# Patient Record
Sex: MALE | Race: BLACK OR AFRICAN AMERICAN | NOT HISPANIC OR LATINO
[De-identification: names, ages, dates, MRNs, and addresses within clinical notes are randomized per-mention and may not be internally consistent; named-entity substitution may affect disease eponyms.]

---

## 2018-03-29 PROBLEM — Z00.00 ENCOUNTER FOR PREVENTIVE HEALTH EXAMINATION: Status: ACTIVE | Noted: 2018-03-29

## 2018-04-16 ENCOUNTER — APPOINTMENT (OUTPATIENT)
Dept: UROLOGY | Facility: CLINIC | Age: 58
End: 2018-04-16
Payer: MEDICAID

## 2018-04-16 VITALS
HEART RATE: 91 BPM | RESPIRATION RATE: 18 BRPM | DIASTOLIC BLOOD PRESSURE: 80 MMHG | OXYGEN SATURATION: 98 % | WEIGHT: 199 LBS | BODY MASS INDEX: 27.86 KG/M2 | HEIGHT: 71 IN | TEMPERATURE: 97.8 F | SYSTOLIC BLOOD PRESSURE: 122 MMHG

## 2018-04-16 DIAGNOSIS — S69.90XA UNSPECIFIED INJURY OF UNSPECIFIED WRIST, HAND AND FINGER(S), INITIAL ENCOUNTER: ICD-10-CM

## 2018-04-16 DIAGNOSIS — Z86.39 PERSONAL HISTORY OF OTHER ENDOCRINE, NUTRITIONAL AND METABOLIC DISEASE: ICD-10-CM

## 2018-04-16 DIAGNOSIS — Z80.1 FAMILY HISTORY OF MALIGNANT NEOPLASM OF TRACHEA, BRONCHUS AND LUNG: ICD-10-CM

## 2018-04-16 DIAGNOSIS — Z78.9 OTHER SPECIFIED HEALTH STATUS: ICD-10-CM

## 2018-04-16 DIAGNOSIS — Z80.42 FAMILY HISTORY OF MALIGNANT NEOPLASM OF PROSTATE: ICD-10-CM

## 2018-04-16 PROCEDURE — 99204 OFFICE O/P NEW MOD 45 MIN: CPT

## 2018-04-17 LAB — PSA SERPL-MCNC: 7.15 NG/ML

## 2018-04-18 LAB — BACTERIA UR CULT: NORMAL

## 2018-05-07 ENCOUNTER — APPOINTMENT (OUTPATIENT)
Dept: UROLOGY | Facility: CLINIC | Age: 58
End: 2018-05-07
Payer: MEDICAID

## 2018-05-07 VITALS — DIASTOLIC BLOOD PRESSURE: 84 MMHG | SYSTOLIC BLOOD PRESSURE: 130 MMHG

## 2018-05-07 DIAGNOSIS — Z86.39 PERSONAL HISTORY OF OTHER ENDOCRINE, NUTRITIONAL AND METABOLIC DISEASE: ICD-10-CM

## 2018-05-07 PROCEDURE — 99214 OFFICE O/P EST MOD 30 MIN: CPT

## 2018-05-07 RX ORDER — METFORMIN HYDROCHLORIDE 500 MG/1
500 TABLET, COATED ORAL
Refills: 0 | Status: ACTIVE | COMMUNITY

## 2018-06-04 ENCOUNTER — LABORATORY RESULT (OUTPATIENT)
Age: 58
End: 2018-06-04

## 2018-06-04 ENCOUNTER — APPOINTMENT (OUTPATIENT)
Dept: UROLOGY | Facility: CLINIC | Age: 58
End: 2018-06-04
Payer: MEDICAID

## 2018-06-04 VITALS — SYSTOLIC BLOOD PRESSURE: 140 MMHG | DIASTOLIC BLOOD PRESSURE: 80 MMHG | OXYGEN SATURATION: 97 % | HEART RATE: 88 BPM

## 2018-06-04 PROCEDURE — 55700: CPT

## 2018-06-04 PROCEDURE — 76942 ECHO GUIDE FOR BIOPSY: CPT | Mod: 59

## 2018-06-04 PROCEDURE — 76872 US TRANSRECTAL: CPT

## 2018-06-04 RX ORDER — AMIKACIN SULFATE 250 MG/ML
500 INJECTION, SOLUTION INTRAMUSCULAR; INTRAVENOUS
Refills: 0 | Status: COMPLETED | OUTPATIENT
Start: 2018-06-04

## 2018-06-04 RX ADMIN — AMIKACIN SULFATE 0 MG/2ML: 250 INJECTION, SOLUTION INTRAMUSCULAR; INTRAVENOUS at 00:00

## 2018-06-11 ENCOUNTER — APPOINTMENT (OUTPATIENT)
Dept: UROLOGY | Facility: CLINIC | Age: 58
End: 2018-06-11
Payer: MEDICAID

## 2018-06-11 PROCEDURE — 99214 OFFICE O/P EST MOD 30 MIN: CPT

## 2018-06-11 RX ORDER — DICLOFENAC SODIUM 50 MG/1
50 TABLET, DELAYED RELEASE ORAL
Qty: 14 | Refills: 0 | Status: ACTIVE | COMMUNITY
Start: 2018-06-07

## 2018-06-11 RX ORDER — AMOXICILLIN AND CLAVULANATE POTASSIUM 875; 125 MG/1; MG/1
875-125 TABLET, COATED ORAL
Qty: 6 | Refills: 0 | Status: DISCONTINUED | COMMUNITY
Start: 2018-05-07 | End: 2018-06-11

## 2018-12-13 ENCOUNTER — INBOUND DOCUMENT (OUTPATIENT)
Age: 58
End: 2018-12-13

## 2018-12-17 ENCOUNTER — APPOINTMENT (OUTPATIENT)
Dept: UROLOGY | Facility: CLINIC | Age: 58
End: 2018-12-17

## 2019-03-20 ENCOUNTER — MESSAGE (OUTPATIENT)
Age: 59
End: 2019-03-20

## 2019-03-25 ENCOUNTER — APPOINTMENT (OUTPATIENT)
Dept: UROLOGY | Facility: CLINIC | Age: 59
End: 2019-03-25
Payer: MEDICAID

## 2019-03-25 VITALS
BODY MASS INDEX: 27.72 KG/M2 | WEIGHT: 198 LBS | SYSTOLIC BLOOD PRESSURE: 150 MMHG | DIASTOLIC BLOOD PRESSURE: 90 MMHG | HEIGHT: 71 IN | OXYGEN SATURATION: 98 % | HEART RATE: 95 BPM | TEMPERATURE: 98.1 F

## 2019-03-25 PROCEDURE — 99214 OFFICE O/P EST MOD 30 MIN: CPT

## 2019-03-25 RX ORDER — TAMSULOSIN HYDROCHLORIDE 0.4 MG/1
0.4 CAPSULE ORAL
Qty: 90 | Refills: 0 | Status: DISCONTINUED | COMMUNITY
Start: 2018-05-07 | End: 2019-03-25

## 2019-03-25 NOTE — LETTER BODY
[Dear  ___] : Dear  [unfilled], [Consult Letter:] : I had the pleasure of evaluating your patient, [unfilled]. [Consult Closing:] : Thank you very much for allowing me to participate in the care of this patient.  If you have any questions, please do not hesitate to contact me. [FreeTextEntry1] : ACP\par [address] \[place], [place], [address]  \par [phone] \par

## 2019-03-25 NOTE — PHYSICAL EXAM
[General Appearance - Well Developed] : well developed [General Appearance - Well Nourished] : well nourished [Normal Appearance] : normal appearance [Well Groomed] : well groomed [General Appearance - In No Acute Distress] : no acute distress [Abdomen Soft] : soft [Abdomen Tenderness] : non-tender [Costovertebral Angle Tenderness] : no ~M costovertebral angle tenderness [Urethral Meatus] : meatus normal [Urinary Bladder Findings] : the bladder was normal on palpation [Scrotum] : the scrotum was normal [Testes Tenderness] : no tenderness of the testes [Testes Mass (___cm)] : there were no testicular masses [Prostate Tenderness] : the prostate was not tender [No Prostate Nodules] : no prostate nodules [Prostate Enlarged] : was enlarged [FreeTextEntry1] : scarred and tight phimosis, JAZMIN done 4/2018 [] : no respiratory distress [Respiration, Rhythm And Depth] : normal respiratory rhythm and effort [Exaggerated Use Of Accessory Muscles For Inspiration] : no accessory muscle use [Oriented To Time, Place, And Person] : oriented to person, place, and time [Affect] : the affect was normal [Mood] : the mood was normal [Not Anxious] : not anxious

## 2019-03-25 NOTE — REVIEW OF SYSTEMS
[see HPI] : see HPI [Nocturia] : nocturia [Erectile Dysfunction] : erectile dysfunction [Negative] : Gastrointestinal

## 2019-03-25 NOTE — HISTORY OF PRESENT ILLNESS
[FreeTextEntry1] : He is a 58-year-old man who is seen today for multiple urological issues. He is no longer taking Flomax. For a short time he had difficulty with urination but it has subsided. Residual urine today wiwas th minimal. He is still interested in circumcision due to tight foreskin. Previously, he was prescribed generic Viagra but he has not used it yet. Also he thinks diabetes is better controlled but he has not been checked recently. According to the patient, he is supposed to have repeat blood work including PSA level in the next few days. His last hemoglobin A1c was still around 12. Prostate biopsy done for PSA level of 7 was benign in June 2018.\par Previous note: Nocturia was almost every hour. Urinary flow is average. In March 2018, PSA level was 7.9. There is no hematuria or dysuria. His father may have had prostate cancer. Patient had not seen a doctor for over 10 years and was first seen in March 2018. Hemoglobin A1c was over 13 and urinalysis shows glucose and white blood cells.

## 2019-03-25 NOTE — ASSESSMENT
[FreeTextEntry1] : Urinary symptoms improved and he no longer is on Flomax. He will give a try to Viagra when he is ready. He still has a very tight phimosis and will be scheduled in the near future for circumcision. Hopefully diabetes is controlled better. Also he will have a PSA level done soon and he can call me to discuss the results. He might require pelvic MRI if PSA is still elevated. He has a quite large prostate.\par \par Beto Bynum MD, FACS\par [facility]  of Urology\[facility] \[facility] [address], [address]\[place] [address]\par \par [address], [address]\[place] [address]\[facility] \[facility] Tel: [phone]\par Fax: [phone]

## 2019-04-08 ENCOUNTER — MESSAGE (OUTPATIENT)
Age: 59
End: 2019-04-08

## 2019-04-17 ENCOUNTER — OUTPATIENT (OUTPATIENT)
Dept: OUTPATIENT SERVICES | Facility: HOSPITAL | Age: 59
LOS: 1 days | End: 2019-04-17
Payer: MEDICAID

## 2019-04-17 VITALS
DIASTOLIC BLOOD PRESSURE: 100 MMHG | RESPIRATION RATE: 16 BRPM | HEIGHT: 69 IN | HEART RATE: 81 BPM | SYSTOLIC BLOOD PRESSURE: 150 MMHG | WEIGHT: 186.07 LBS | TEMPERATURE: 97 F

## 2019-04-17 DIAGNOSIS — N47.1 PHIMOSIS: ICD-10-CM

## 2019-04-17 DIAGNOSIS — N40.0 BENIGN PROSTATIC HYPERPLASIA WITHOUT LOWER URINARY TRACT SYMPTOMS: Chronic | ICD-10-CM

## 2019-04-17 DIAGNOSIS — E11.9 TYPE 2 DIABETES MELLITUS WITHOUT COMPLICATIONS: ICD-10-CM

## 2019-04-17 DIAGNOSIS — R06.83 SNORING: ICD-10-CM

## 2019-04-17 DIAGNOSIS — Z87.19 PERSONAL HISTORY OF OTHER DISEASES OF THE DIGESTIVE SYSTEM: Chronic | ICD-10-CM

## 2019-04-17 DIAGNOSIS — R52 PAIN, UNSPECIFIED: Chronic | ICD-10-CM

## 2019-04-17 DIAGNOSIS — R94.31 ABNORMAL ELECTROCARDIOGRAM [ECG] [EKG]: ICD-10-CM

## 2019-04-17 LAB
ANION GAP SERPL CALC-SCNC: 13 MMO/L — SIGNIFICANT CHANGE UP (ref 7–14)
BUN SERPL-MCNC: 13 MG/DL — SIGNIFICANT CHANGE UP (ref 7–23)
CALCIUM SERPL-MCNC: 9.7 MG/DL — SIGNIFICANT CHANGE UP (ref 8.4–10.5)
CHLORIDE SERPL-SCNC: 103 MMOL/L — SIGNIFICANT CHANGE UP (ref 98–107)
CO2 SERPL-SCNC: 22 MMOL/L — SIGNIFICANT CHANGE UP (ref 22–31)
CREAT SERPL-MCNC: 1 MG/DL — SIGNIFICANT CHANGE UP (ref 0.5–1.3)
GLUCOSE SERPL-MCNC: 219 MG/DL — HIGH (ref 70–99)
HBA1C BLD-MCNC: 9.2 % — HIGH (ref 4–5.6)
HCT VFR BLD CALC: 43.1 % — SIGNIFICANT CHANGE UP (ref 39–50)
HGB BLD-MCNC: 14.1 G/DL — SIGNIFICANT CHANGE UP (ref 13–17)
MCHC RBC-ENTMCNC: 29.2 PG — SIGNIFICANT CHANGE UP (ref 27–34)
MCHC RBC-ENTMCNC: 32.7 % — SIGNIFICANT CHANGE UP (ref 32–36)
MCV RBC AUTO: 89.2 FL — SIGNIFICANT CHANGE UP (ref 80–100)
NRBC # FLD: 0 K/UL — SIGNIFICANT CHANGE UP (ref 0–0)
PLATELET # BLD AUTO: 237 K/UL — SIGNIFICANT CHANGE UP (ref 150–400)
PMV BLD: 9 FL — SIGNIFICANT CHANGE UP (ref 7–13)
POTASSIUM SERPL-MCNC: 3.8 MMOL/L — SIGNIFICANT CHANGE UP (ref 3.5–5.3)
POTASSIUM SERPL-SCNC: 3.8 MMOL/L — SIGNIFICANT CHANGE UP (ref 3.5–5.3)
RBC # BLD: 4.83 M/UL — SIGNIFICANT CHANGE UP (ref 4.2–5.8)
RBC # FLD: 12.2 % — SIGNIFICANT CHANGE UP (ref 10.3–14.5)
SODIUM SERPL-SCNC: 138 MMOL/L — SIGNIFICANT CHANGE UP (ref 135–145)
WBC # BLD: 4.27 K/UL — SIGNIFICANT CHANGE UP (ref 3.8–10.5)
WBC # FLD AUTO: 4.27 K/UL — SIGNIFICANT CHANGE UP (ref 3.8–10.5)

## 2019-04-17 PROCEDURE — 93010 ELECTROCARDIOGRAM REPORT: CPT

## 2019-04-17 NOTE — H&P PST ADULT - NSICDXPROBLEM_GEN_ALL_CORE_FT
PROBLEM DIAGNOSES  Problem: Phimosis  Assessment and Plan: This is a  57 y/o male who is scheduled for circumcision  * Given pre op famotidine and instruction    Problem: Snoring  Assessment and Plan: Notified OR booking via fax of MACEY precautions    Problem: Abnormal EKG  Assessment and Plan: Await cardiac evaluation to be arranged by pcp due to abnormal ekg and patient has NEVER had an ekg, echo, or stress test and patient has elevated BP at PAST with no h/o htn  * Need to notify surgeon of pre op cardiac evaluation    Problem: Diabetes mellitus  Assessment and Plan: INstructed to stop metformin 24 hours before surgery PROBLEM DIAGNOSES  Problem: Phimosis  Assessment and Plan: This is a  57 y/o male who is scheduled for circumcision  * Given pre op famotidine and instruction    Problem: Snoring  Assessment and Plan: Notified OR booking via fax of MACEY precautions    Problem: Abnormal EKG  Assessment and Plan: Await cardiac evaluation to be arranged by pcp due to abnormal ekg and patient has NEVER had an ekg, echo, or stress test done, as well as patient had elevated BP at PAST with no h/o htn  * Need to notify surgeon of pre op cardiac evaluation    Problem: Diabetes mellitus  Assessment and Plan: INstructed to stop metformin 24 hours before surgery PROBLEM DIAGNOSES  Problem: Phimosis  Assessment and Plan: This is a  59 y/o male who is scheduled for circumcision  * Given pre op famotidine and instruction    Problem: Snoring  Assessment and Plan: Notified OR booking via fax of MACEY precautions    Problem: Abnormal EKG  Assessment and Plan: Await cardiac evaluation to be arranged by pcp due to abnormal ekg and patient has NEVER had an ekg, echo, or stress test and patient has elevated BP at PAST with no h/o htn  * Need to notify surgeon of pre op cardiac evaluation--spoke to Lisa in surgeon's office    Problem: Diabetes mellitus  Assessment and Plan: INstructed to stop metformin 24 hours before surgery

## 2019-04-17 NOTE — H&P PST ADULT - HISTORY OF PRESENT ILLNESS
This is a 57 y/o male who presents with phimosis. MD recommended intervention. Scheduled for circumcision on 4-30-19

## 2019-04-17 NOTE — H&P PST ADULT - NSICDXPASTSURGICALHX_GEN_ALL_CORE_FT
PAST SURGICAL HISTORY:  Enlarged prostate negatie biopsy    H/O cholelithiasis s/p lap cholecystectomy    Pain left pinky surgery with hardware --- hardware eventually removed

## 2019-04-17 NOTE — H&P PST ADULT - NSICDXPASTMEDICALHX_GEN_ALL_CORE_FT
PAST MEDICAL HISTORY:  Arthropathy right shoulder -- never worked up    Diabetes mellitus type II, diagnosed in 2018    Enlarged prostate     H/O phimosis 2019    Hypertension     Snoring MACEY precautions -- responds affirmatively to STOP BANG questionnaire

## 2019-04-17 NOTE — H&P PST ADULT - ENDOCRINE COMMENTS
denies thyroid disease; DM type II -- finger sticks in the am 120-140; finger sticks in the evening 140-160

## 2019-04-17 NOTE — H&P PST ADULT - NSANTHOSAYNRD_GEN_A_CORE
No. MACEY screening performed.  STOP BANG Legend: 0-2 = LOW Risk; 3-4 = INTERMEDIATE Risk; 5-8 = HIGH Risk

## 2019-04-18 ENCOUNTER — APPOINTMENT (OUTPATIENT)
Dept: UROLOGY | Facility: CLINIC | Age: 59
End: 2019-04-18

## 2019-04-18 PROBLEM — R06.83 SNORING: Chronic | Status: ACTIVE | Noted: 2019-04-17

## 2019-04-18 PROBLEM — Z87.438 PERSONAL HISTORY OF OTHER DISEASES OF MALE GENITAL ORGANS: Chronic | Status: ACTIVE | Noted: 2019-04-17

## 2019-04-18 PROBLEM — E11.9 TYPE 2 DIABETES MELLITUS WITHOUT COMPLICATIONS: Chronic | Status: ACTIVE | Noted: 2019-04-17

## 2019-04-18 PROBLEM — M12.9 ARTHROPATHY, UNSPECIFIED: Chronic | Status: ACTIVE | Noted: 2019-04-17

## 2019-04-19 ENCOUNTER — APPOINTMENT (OUTPATIENT)
Dept: UROLOGY | Facility: CLINIC | Age: 59
End: 2019-04-19

## 2019-04-22 ENCOUNTER — MESSAGE (OUTPATIENT)
Age: 59
End: 2019-04-22

## 2019-04-28 ENCOUNTER — APPOINTMENT (OUTPATIENT)
Dept: MRI IMAGING | Facility: IMAGING CENTER | Age: 59
End: 2019-04-28

## 2019-04-29 ENCOUNTER — TRANSCRIPTION ENCOUNTER (OUTPATIENT)
Age: 59
End: 2019-04-29

## 2019-04-30 ENCOUNTER — RESULT REVIEW (OUTPATIENT)
Age: 59
End: 2019-04-30

## 2019-04-30 ENCOUNTER — APPOINTMENT (OUTPATIENT)
Dept: UROLOGY | Facility: AMBULATORY SURGERY CENTER | Age: 59
End: 2019-04-30

## 2019-04-30 ENCOUNTER — OUTPATIENT (OUTPATIENT)
Dept: OUTPATIENT SERVICES | Facility: HOSPITAL | Age: 59
LOS: 1 days | Discharge: HOME | End: 2019-04-30
Payer: MEDICAID

## 2019-04-30 VITALS
HEART RATE: 93 BPM | DIASTOLIC BLOOD PRESSURE: 91 MMHG | WEIGHT: 186.07 LBS | RESPIRATION RATE: 20 BRPM | OXYGEN SATURATION: 99 % | SYSTOLIC BLOOD PRESSURE: 147 MMHG | TEMPERATURE: 97 F | HEIGHT: 69 IN

## 2019-04-30 VITALS
SYSTOLIC BLOOD PRESSURE: 135 MMHG | HEART RATE: 86 BPM | DIASTOLIC BLOOD PRESSURE: 82 MMHG | RESPIRATION RATE: 19 BRPM | OXYGEN SATURATION: 100 % | TEMPERATURE: 97 F

## 2019-04-30 DIAGNOSIS — N47.1 PHIMOSIS: ICD-10-CM

## 2019-04-30 DIAGNOSIS — N40.0 BENIGN PROSTATIC HYPERPLASIA WITHOUT LOWER URINARY TRACT SYMPTOMS: Chronic | ICD-10-CM

## 2019-04-30 DIAGNOSIS — Z87.19 PERSONAL HISTORY OF OTHER DISEASES OF THE DIGESTIVE SYSTEM: Chronic | ICD-10-CM

## 2019-04-30 DIAGNOSIS — R52 PAIN, UNSPECIFIED: Chronic | ICD-10-CM

## 2019-04-30 LAB — GLUCOSE BLDC GLUCOMTR-MCNC: 211 MG/DL — HIGH (ref 70–99)

## 2019-04-30 PROCEDURE — 54161 CIRCUM 28 DAYS OR OLDER: CPT

## 2019-04-30 PROCEDURE — 88304 TISSUE EXAM BY PATHOLOGIST: CPT | Mod: 26

## 2019-04-30 RX ORDER — OXYCODONE AND ACETAMINOPHEN 5; 325 MG/1; MG/1
1 TABLET ORAL
Qty: 8 | Refills: 0
Start: 2019-04-30 | End: 2019-05-01

## 2019-04-30 RX ORDER — SODIUM CHLORIDE 9 MG/ML
1000 INJECTION, SOLUTION INTRAVENOUS
Qty: 0 | Refills: 0 | Status: DISCONTINUED | OUTPATIENT
Start: 2019-04-30 | End: 2019-05-15

## 2019-04-30 NOTE — ASU DISCHARGE PLAN (ADULT/PEDIATRIC) - PAIN MANAGEMENT
Prescription called to pharmacy Prescriptions electronically submitted to pharmacy from [place]/Take over the counter pain medication

## 2019-04-30 NOTE — ASU DISCHARGE PLAN (ADULT/PEDIATRIC) - ASU DC SPECIAL INSTRUCTIONSFT
Please follow up with Dr. Bynum this thursday for dressing removal, call to confirm your appointment [phone].  Please take tylenol and motrin as needed for pain and percocet as needed for severe pain but DO NOT EXCEED 4 grams of tylenol daily (percocet contains tylenol). Do not shower or bathe until after thursday.

## 2019-04-30 NOTE — ASU DISCHARGE PLAN (ADULT/PEDIATRIC) - MEDICATION INSTRUCTIONS
can take percocet after 6 PM, then every 4-6 hours after. Can take tylenol after 6 PM. DO NOT TAKE TYLENOL AND PERCOCET AT THE SAME TIME

## 2019-04-30 NOTE — ASU DISCHARGE PLAN (ADULT/PEDIATRIC) - CARE PROVIDER_API CALL
Beto Bynum)  Urology  [address], [place] [address]  Phone: [phone]  Fax: [phone]  Follow Up Time:

## 2019-05-01 PROBLEM — I10 ESSENTIAL (PRIMARY) HYPERTENSION: Chronic | Status: ACTIVE | Noted: 2019-04-17

## 2019-05-01 PROBLEM — N40.0 BENIGN PROSTATIC HYPERPLASIA WITHOUT LOWER URINARY TRACT SYMPTOMS: Chronic | Status: ACTIVE | Noted: 2019-04-17

## 2019-05-02 ENCOUNTER — MESSAGE (OUTPATIENT)
Age: 59
End: 2019-05-02

## 2019-05-02 ENCOUNTER — APPOINTMENT (OUTPATIENT)
Dept: UROLOGY | Facility: CLINIC | Age: 59
End: 2019-05-02

## 2019-05-06 ENCOUNTER — APPOINTMENT (OUTPATIENT)
Dept: UROLOGY | Facility: CLINIC | Age: 59
End: 2019-05-06
Payer: MEDICAID

## 2019-05-06 VITALS — SYSTOLIC BLOOD PRESSURE: 104 MMHG | DIASTOLIC BLOOD PRESSURE: 70 MMHG

## 2019-05-06 LAB — SURGICAL PATHOLOGY STUDY: SIGNIFICANT CHANGE UP

## 2019-05-06 PROCEDURE — 99024 POSTOP FOLLOW-UP VISIT: CPT

## 2019-05-06 RX ORDER — AMLODIPINE BESYLATE 2.5 MG/1
2.5 TABLET ORAL
Qty: 30 | Refills: 0 | Status: ACTIVE | COMMUNITY
Start: 2019-04-23

## 2019-05-06 RX ORDER — HYDROCHLOROTHIAZIDE 25 MG/1
25 TABLET ORAL
Qty: 30 | Refills: 0 | Status: ACTIVE | COMMUNITY
Start: 2019-04-23

## 2019-05-06 RX ORDER — ACETAMINOPHEN 500 MG
500 TABLET ORAL
Qty: 30 | Refills: 0 | Status: ACTIVE | COMMUNITY
Start: 2019-04-25

## 2019-05-06 RX ORDER — OXYCODONE AND ACETAMINOPHEN 5; 325 MG/1; MG/1
5-325 TABLET ORAL
Qty: 8 | Refills: 0 | Status: ACTIVE | COMMUNITY
Start: 2019-04-30

## 2019-05-06 NOTE — LETTER BODY
[Dear  ___] : Dear  [unfilled], [Consult Letter:] : I had the pleasure of evaluating your patient, [unfilled]. [Consult Closing:] : Thank you very much for allowing me to participate in the care of this patient.  If you have any questions, please do not hesitate to contact me. [FreeTextEntry1] : ACP\par [address] \[place], [place], [address]  \par [phone] \par

## 2019-05-06 NOTE — ASSESSMENT
[FreeTextEntry1] : Postoperative care and hygiene was stressed. He has erythema of the glans penis. Keflex will be given for a few days. MRI of the prostate was denied by his insurance company and PSA level increased. I would recommend continued followup in about 3 months.\par \par Beto Bynum MD, FACS\par [facility]\par  of Urology\par \par [address], [address]\[place] [address]\par \par [address], [address]\[place] [address]\par \par Tel: [phone]\par Fax: [phone]

## 2019-05-06 NOTE — PHYSICAL EXAM
[General Appearance - Well Developed] : well developed [Normal Appearance] : normal appearance [General Appearance - Well Nourished] : well nourished [Well Groomed] : well groomed [General Appearance - In No Acute Distress] : no acute distress [Abdomen Soft] : soft [Costovertebral Angle Tenderness] : no ~M costovertebral angle tenderness [Abdomen Tenderness] : non-tender [Urinary Bladder Findings] : the bladder was normal on palpation [Urethral Meatus] : meatus normal [Testes Tenderness] : no tenderness of the testes [Scrotum] : the scrotum was normal [Prostate Tenderness] : the prostate was not tender [Testes Mass (___cm)] : there were no testicular masses [No Prostate Nodules] : no prostate nodules [Prostate Enlarged] : was enlarged [FreeTextEntry1] : JAZMIN done 4/2018. Incision intact, erythema noted.

## 2019-05-06 NOTE — HISTORY OF PRESENT ILLNESS
[FreeTextEntry1] : He is a 58-year-old man who is seen today for multiple urological issues. He underwent circumcision for a very tight phimosis last week. He is here today for followup. MRI of the prostate was denied by his insurance company. Previously, residual urine volume was minimal.\par Also, previously, he was prescribed generic Viagra but he has not used it yet. Prostate biopsy done for PSA level of 7 was benign in June 2018. In April 2019, PSA level was 9.25 and hemoglobin A1c 9.2.\par Previous note: Nocturia was almost every hour. Urinary flow is average. There is no hematuria or dysuria. His father may have had prostate cancer. Patient had not seen a doctor for over 10 years and was first seen in March 2018. Hemoglobin A1c was over 13 and urinalysis showed glucose and white blood cells.

## 2019-08-07 ENCOUNTER — APPOINTMENT (OUTPATIENT)
Dept: UROLOGY | Facility: CLINIC | Age: 59
End: 2019-08-07

## 2019-09-23 ENCOUNTER — APPOINTMENT (OUTPATIENT)
Dept: ORTHOPEDIC SURGERY | Facility: CLINIC | Age: 59
End: 2019-09-23
Payer: MEDICAID

## 2019-09-25 ENCOUNTER — APPOINTMENT (OUTPATIENT)
Dept: ORTHOPEDIC SURGERY | Facility: CLINIC | Age: 59
End: 2019-09-25
Payer: MEDICAID

## 2019-09-25 VITALS
SYSTOLIC BLOOD PRESSURE: 145 MMHG | BODY MASS INDEX: 26.6 KG/M2 | DIASTOLIC BLOOD PRESSURE: 90 MMHG | WEIGHT: 190 LBS | HEIGHT: 71 IN | HEART RATE: 93 BPM

## 2019-09-25 DIAGNOSIS — M75.41 IMPINGEMENT SYNDROME OF RIGHT SHOULDER: ICD-10-CM

## 2019-09-25 PROCEDURE — 99204 OFFICE O/P NEW MOD 45 MIN: CPT | Mod: 25

## 2019-09-25 PROCEDURE — 20610 DRAIN/INJ JOINT/BURSA W/O US: CPT | Mod: RT

## 2019-09-25 NOTE — PROCEDURE
[de-identified] : Injection: Right  Shoulder Subacromial Space.\par Indication: Impingement.\par \par A discussion was had with the patient regarding this procedure and all questions were answered. All risks, benefits and alternatives were discussed. These included but were not limited to bleeding, infection, and allergic reaction.  A timeout was done to ensure correct side and pt agreed to the procedure.   Alcohol was used to clean the skin, and betadine was used to sterilize and prep the area in the posterior aspect of the shoulder. Ethyl chloride spray was then used as a topical anesthetic. A 22-gauge 1.5" needle was used to inject 2cc of 1% lidocaine without epinephrine and 1cc of 40mg/ml methylprednisolone into the subacromial space. A sterile bandage was then applied. The patient tolerated the procedure well and there were no complications.\par

## 2019-09-25 NOTE — DISCUSSION/SUMMARY
[de-identified] : Discussed findings of today's exam and possible causes of patient's pain.  Educated patient on their most probable diagnosis of right shoulder impingement.  Reviewed possible courses of treatment, and we collaboratively decided best course of treatment at this time will include  cortisone injection today (see procedure note).  Informed the patient that the numbing medicine in today's injection will last for about 4-6 hours. The steroid that was injected will start to work in 1 to 2 days, peak at 1-2 weeks, and may last up to 1-2 months.  Patient will be started on a course of physical therapy to restore normal range of motion and strength as tolerated. Patient may continue taking oral NSAIDs as needed for pain.  Follow up as needed.  Patient appreciates and agrees with current plan.\par \par This note was generated using dragon medical dictation software.  A reasonable effort has been made for proofreading its contents, but typos may still remain.  If there are any questions or points of clarification needed please notify my office.

## 2019-09-25 NOTE — PHYSICAL EXAM
[de-identified] : Constitutional: Well-nourished, well-developed, No acute distress\par Respiratory:  Good respiratory effort, no SOB\par Lymphatic: No regional lymphadenopathy, no lymphedema\par Psychiatric: Pleasant and normal affect, alert and oriented x3\par Skin: Clean dry and intact B/L UE/LE\par Musculoskeletal: normal except where as noted in regional exam\par \par \par Left Shoulder:\par APPEARANCE: no marked deformities, no swelling or malalignment\par POSITIVE TENDERNESS: none\par NONTENDER: supraspinatus, infraspinatus, teres minor, LH biceps, anterior and posterior capsule, AC joint\par ROM: full & painless, no scapular winging or dyskinesia present\par RESISTIVE TESTING: painless 5/5 resisted flex/ext, empty can/ER/IR, horizontal abd/add \par SPECIAL TESTS: neg Drop Arm, neg Empty Can, neg Shelby/Neers, neg Del Real's, neg Speeds, neg Apprehension, neg cross arm adduction, neg apley's scratch test\par Vasc: 2+ radial pulse\par Neuro: AIN, PIN, Ulnar nerve intact to motor, DTRs 2+/4 biceps, triceps, brachioradialis\par Sensation: Intact to light touch throughout\par B/L Elbows:  No asymmetry, malalignment, or swelling, Full ROM, 5/5 strength in flexion/ext, pronation/supination, Joints stable\par B/L Wrist and Hand:  No asymmetry, malalignment, or swelling, Full ROM, 5/5 strength in wrist and long finger flexion/ext, radial/ulnar deviation, Joints stable\par \par Right Shoulder:\par APPEARANCE: no marked deformities, no swelling or malalignment\par POSITIVE TENDERNESS: supraspinatus, long head biceps tendon\par NONTENDER:  infraspinatus, teres minor. biceps. anterior and posterior capsule. AC joint. \par ROM: full with mild painful arc past 60 degrees, no scapular winging or dyskinesia present\par RESISTIVE TESTING: MMT 4+/5 ER, Flexion and Empty can, 5/5 IR. painless 5/5 resisted ext, horizontal abd/add \par SPECIAL TESTS: + Shelby and Neers, mildly + cross arm adduction, + Speeds, neg Del Real's, neg Drop Arm, neg Apprehension. neg apley's scratch test\par Vasc: 2+ radial pulse\par Neuro: AIN, PIN, Ulnar nerve intact to motor, DTRs 2+/4 biceps, triceps, brachioradialis\par Sensation: Intact to light touch throughout\par

## 2019-09-25 NOTE — HISTORY OF PRESENT ILLNESS
[de-identified] : Patient is here for right shoulder pain that began about 6 months ago without inciting injury. He states that the pain began as intermittent, mainly occurring at night but has become constant. He saw a different provider and was given NSAIDs and took xrays which were negative for fracture. He states that the pain stays localized around the shoulder. Denies N/T/Prior injury. He is retired from working in concrete. He does not do any exercises on his own. \par \par The patient's past medical history, past surgical history, medications and allergies were reviewed by me today and documented accordingly. In addition, the patient's family and social history, which were noncontributory to this visit, were reviewed also. The patient has no family history of arthritis.

## 2020-01-06 NOTE — H&P PST ADULT - HEIGHT IN INCHES
Called patient, left a VM advising she needs an apt. Number given to schedule. Susan Ahn Paramedic on 1/6/2020 at 10:37 AM      9

## 2020-02-26 ENCOUNTER — APPOINTMENT (OUTPATIENT)
Dept: UROLOGY | Facility: CLINIC | Age: 60
End: 2020-02-26
Payer: MEDICAID

## 2020-02-26 VITALS — OXYGEN SATURATION: 98 % | HEART RATE: 95 BPM | DIASTOLIC BLOOD PRESSURE: 70 MMHG | SYSTOLIC BLOOD PRESSURE: 124 MMHG

## 2020-02-26 DIAGNOSIS — Z87.438 PERSONAL HISTORY OF OTHER DISEASES OF MALE GENITAL ORGANS: ICD-10-CM

## 2020-02-26 PROCEDURE — 99214 OFFICE O/P EST MOD 30 MIN: CPT

## 2020-02-26 RX ORDER — CEPHALEXIN 500 MG/1
500 CAPSULE ORAL
Qty: 10 | Refills: 0 | Status: DISCONTINUED | COMMUNITY
Start: 2019-05-06 | End: 2020-02-26

## 2020-02-26 NOTE — PHYSICAL EXAM
[General Appearance - Well Developed] : well developed [General Appearance - Well Nourished] : well nourished [Normal Appearance] : normal appearance [Well Groomed] : well groomed [General Appearance - In No Acute Distress] : no acute distress [Abdomen Soft] : soft [Abdomen Tenderness] : non-tender [Costovertebral Angle Tenderness] : no ~M costovertebral angle tenderness [Urethral Meatus] : meatus normal [Penis Abnormality] : normal circumcised penis [Urinary Bladder Findings] : the bladder was normal on palpation [Scrotum] : the scrotum was normal [Testes Tenderness] : no tenderness of the testes [Testes Mass (___cm)] : there were no testicular masses [Prostate Tenderness] : the prostate was not tender [No Prostate Nodules] : no prostate nodules [Prostate Enlarged] : was enlarged [] : no respiratory distress [FreeTextEntry1] : JAZMIN done in 2018.  [Exaggerated Use Of Accessory Muscles For Inspiration] : no accessory muscle use [Respiration, Rhythm And Depth] : normal respiratory rhythm and effort [Oriented To Time, Place, And Person] : oriented to person, place, and time [Affect] : the affect was normal [Not Anxious] : not anxious [Mood] : the mood was normal

## 2020-02-26 NOTE — ASSESSMENT
[FreeTextEntry1] : ED: He could try Cialis 20 mg instead. If not effective, we could try injection therapy. Urinary symptoms: It has improved after initial diagnosis and treatment of DM. Elevated PSA: We may consider trying again for prostate MRI depending on PSA level is which is done today. He has healed well from circumcision. Follow up in a few months depending on the results.\par \par Beto Bynum MD, FACS\par [facility]\par  of Urology\par [address] [place] [address], [address]\[place] [address]\par Tel: [phone]\par Fax: [phone]\par

## 2020-02-26 NOTE — LETTER BODY
[Dear  ___] : Dear ~MALU, [Consult Letter:] : I had the pleasure of evaluating your patient, [unfilled]. [Consult Closing:] : Thank you very much for allowing me to participate in the care of this patient.  If you have any questions, please do not hesitate to contact me. [FreeTextEntry1] : ACP\par [address] \[place], [address]  \par [phone] \par

## 2020-02-26 NOTE — HISTORY OF PRESENT ILLNESS
[FreeTextEntry1] : He is a 59-year-old man who is seen today for a few urological issues. He recovered well from circumcision which was done in 2019. He has not responded to Viagra 100 mg.  Urinary symptoms have improved. Last hemoglobin A1c was 9.6. PSA level was repeated today by his primary care physician. Also MRI of the prostate was denied by his insurance company previously. There is no hematuria, dysuria or flank pain. Prostate biopsy done for PSA level of 7 was benign in June 2018. In April 2019, PSA level was 9.25.\par Previous note: Nocturia was almost every hour. Urinary flow is average. His father may have had prostate cancer. Patient had not seen a doctor for over 10 years and was first seen in March 2018. Hemoglobin A1c was over 13 and urinalysis showed glucose and white blood cells.

## 2020-07-08 ENCOUNTER — APPOINTMENT (OUTPATIENT)
Dept: UROLOGY | Facility: CLINIC | Age: 60
End: 2020-07-08

## 2021-03-17 ENCOUNTER — APPOINTMENT (OUTPATIENT)
Dept: UROLOGY | Facility: CLINIC | Age: 61
End: 2021-03-17
Payer: MEDICAID

## 2021-03-17 VITALS
DIASTOLIC BLOOD PRESSURE: 86 MMHG | HEART RATE: 98 BPM | OXYGEN SATURATION: 99 % | BODY MASS INDEX: 26.6 KG/M2 | SYSTOLIC BLOOD PRESSURE: 131 MMHG | WEIGHT: 190 LBS | RESPIRATION RATE: 15 BRPM | TEMPERATURE: 97.3 F | HEIGHT: 71 IN

## 2021-03-17 PROCEDURE — 99214 OFFICE O/P EST MOD 30 MIN: CPT

## 2021-03-17 PROCEDURE — 99072 ADDL SUPL MATRL&STAF TM PHE: CPT

## 2021-03-17 RX ORDER — SILDENAFIL 20 MG/1
20 TABLET ORAL
Qty: 90 | Refills: 3 | Status: DISCONTINUED | COMMUNITY
Start: 2018-05-07 | End: 2021-03-17

## 2021-03-17 RX ORDER — TADALAFIL 20 MG/1
20 TABLET ORAL
Qty: 6 | Refills: 6 | Status: DISCONTINUED | COMMUNITY
Start: 2020-02-26 | End: 2021-03-17

## 2021-03-17 NOTE — PHYSICAL EXAM
[General Appearance - Well Developed] : well developed [General Appearance - Well Nourished] : well nourished [Normal Appearance] : normal appearance [Well Groomed] : well groomed [General Appearance - In No Acute Distress] : no acute distress [Abdomen Soft] : soft [Abdomen Tenderness] : non-tender [Costovertebral Angle Tenderness] : no ~M costovertebral angle tenderness [Urethral Meatus] : meatus normal [Penis Abnormality] : normal circumcised penis [Urinary Bladder Findings] : the bladder was normal on palpation [Scrotum] : the scrotum was normal [Testes Tenderness] : no tenderness of the testes [Testes Mass (___cm)] : there were no testicular masses [Prostate Tenderness] : the prostate was not tender [No Prostate Nodules] : no prostate nodules [Prostate Enlarged] : was enlarged [FreeTextEntry1] : JAZMIN done 3/2021.  [] : no respiratory distress [Respiration, Rhythm And Depth] : normal respiratory rhythm and effort [Exaggerated Use Of Accessory Muscles For Inspiration] : no accessory muscle use [Oriented To Time, Place, And Person] : oriented to person, place, and time [Affect] : the affect was normal [Mood] : the mood was normal [Not Anxious] : not anxious

## 2021-03-17 NOTE — LETTER BODY
[Dear  ___] : Dear ~MALU, [Consult Letter:] : I had the pleasure of evaluating your patient, [unfilled]. [Consult Closing:] : Thank you very much for allowing me to participate in the care of this patient.  If you have any questions, please do not hesitate to contact me. [FreeTextEntry1] : ACP\par [address] \[place], [address]  \par [phone] \par

## 2021-03-17 NOTE — HISTORY OF PRESENT ILLNESS
[FreeTextEntry1] : He is a 60-year-old man who is seen today in follow-up.  Nocturia is 1 or 2 times.  According to the patient, diabetes is still not very well controlled.  He is no longer responding to Viagra 100 mg or Cialis 20 mg.  In March 2021, creatinine level was 1.48, glucose 195 and A1c was still over 9.  Also his PSA level has significantly increased.  PSA was 21.6 in March 2021, 11.6 in February 2020 and 9.2 in April 2019.  There is no weight loss or bone pain.  His father received radiation therapy for prostate cancer.  Residual urine today was less than 100 cc and on office ultrasound the prostate appears large.  He underwent circumcision in 2019.\par \par Prostate biopsy done for PSA level of 7 was benign in June 2018.\par \par Previous note: Nocturia was almost every hour.  Patient had not seen a doctor for over 10 years and was first seen in March 2018. Hemoglobin A1c was over 13 and urinalysis showed glucose and white blood cells.

## 2021-03-17 NOTE — ASSESSMENT
[FreeTextEntry1] : Urinary symptoms are relatively satisfactory.  Obviously he still needs to work on his diabetes control.  He is not responding to oral medication therapy for erectile dysfunction.  We have discussed intracavernosal injections and he may consider it.  Most importantly, PSA level has increased significantly.  His father has prostate cancer.  He has undergone biopsy.  MRI of the prostate will be ordered pending insurance approval and then we will discuss the next step on the phone.\par \par Beto Bynum MD, FACS\par [facility]\par  of Urology\par \par [address], [address]\[place] [address]\par \par [address], [address]\[place] [address]\par \par Tel: [phone]\par Fax: [phone]

## 2021-04-22 ENCOUNTER — RESULT REVIEW (OUTPATIENT)
Age: 61
End: 2021-04-22

## 2021-04-22 ENCOUNTER — OUTPATIENT (OUTPATIENT)
Dept: OUTPATIENT SERVICES | Facility: HOSPITAL | Age: 61
LOS: 1 days | End: 2021-04-22
Payer: MEDICAID

## 2021-04-22 ENCOUNTER — APPOINTMENT (OUTPATIENT)
Dept: MRI IMAGING | Facility: IMAGING CENTER | Age: 61
End: 2021-04-22
Payer: MEDICAID

## 2021-04-22 DIAGNOSIS — Z87.19 PERSONAL HISTORY OF OTHER DISEASES OF THE DIGESTIVE SYSTEM: Chronic | ICD-10-CM

## 2021-04-22 DIAGNOSIS — R52 PAIN, UNSPECIFIED: Chronic | ICD-10-CM

## 2021-04-22 DIAGNOSIS — R97.20 ELEVATED PROSTATE SPECIFIC ANTIGEN [PSA]: ICD-10-CM

## 2021-04-22 DIAGNOSIS — N40.0 BENIGN PROSTATIC HYPERPLASIA WITHOUT LOWER URINARY TRACT SYMPTOMS: Chronic | ICD-10-CM

## 2021-04-22 PROCEDURE — 72197 MRI PELVIS W/O & W/DYE: CPT | Mod: 26

## 2021-04-22 PROCEDURE — A9585: CPT

## 2021-04-22 PROCEDURE — 76377 3D RENDER W/INTRP POSTPROCES: CPT

## 2021-04-22 PROCEDURE — 76377 3D RENDER W/INTRP POSTPROCES: CPT | Mod: 26

## 2021-04-22 PROCEDURE — 72197 MRI PELVIS W/O & W/DYE: CPT

## 2021-04-30 ENCOUNTER — APPOINTMENT (OUTPATIENT)
Dept: UROLOGY | Facility: CLINIC | Age: 61
End: 2021-04-30

## 2021-05-03 ENCOUNTER — APPOINTMENT (OUTPATIENT)
Dept: ORTHOPEDIC SURGERY | Facility: CLINIC | Age: 61
End: 2021-05-03

## 2021-09-01 ENCOUNTER — APPOINTMENT (OUTPATIENT)
Dept: ORTHOPEDIC SURGERY | Facility: CLINIC | Age: 61
End: 2021-09-01

## 2022-06-01 NOTE — ASU DISCHARGE PLAN (ADULT/PEDIATRIC) - ACCOMPANIED BY
An arrival time for procedure was not given during PAT visit. If patient had any questions or concerns about their arrival time, they were instructed to contact their surgeon/physician.  Additionally, if the patient referred to an arrival time that was acquired from their my chart account, patient was encouraged to verify that time with their surgeon/physician.  NO arrival times given in Pre Admission Testing Department.    Patient denies any current skin issues.      Spouse/Family

## 2022-07-27 ENCOUNTER — APPOINTMENT (OUTPATIENT)
Dept: UROLOGY | Facility: CLINIC | Age: 62
End: 2022-07-27

## 2023-08-21 ENCOUNTER — APPOINTMENT (OUTPATIENT)
Dept: ORTHOPEDIC SURGERY | Facility: CLINIC | Age: 63
End: 2023-08-21
Payer: COMMERCIAL

## 2023-08-21 VITALS — DIASTOLIC BLOOD PRESSURE: 88 MMHG | SYSTOLIC BLOOD PRESSURE: 124 MMHG | HEART RATE: 109 BPM

## 2023-08-21 PROCEDURE — 99203 OFFICE O/P NEW LOW 30 MIN: CPT

## 2023-08-21 RX ORDER — METHYLPREDNISOLONE 4 MG/1
4 TABLET ORAL
Qty: 1 | Refills: 0 | Status: ACTIVE | COMMUNITY
Start: 2023-08-21 | End: 1900-01-01

## 2023-08-21 NOTE — DISCUSSION/SUMMARY
[de-identified] : We discussed further treatment options.  He appears to have some symptomatology possibly related to his left shoulder but also radicular components have significantly worsened after recent therapy.  I recommended updated MRI and he will try an oral steroid taper.  Follow-up afterwards.

## 2023-08-21 NOTE — PHYSICAL EXAM
[Ataxic] : not ataxic [de-identified] : Examination of the cervical spine reveals no midline or paraspinal tenderness to palpation. No cervical lymphadenopathy. Decreased range of motion with respect to flexion, extension, rotation, and lateral bending.  Positive Spurlings. Negative Lhermitte's.  Decreased passive range of motion and active of the left shoulder with evidence of impingement. No instability of bilateral upper extremities.  Cranial nerves II through XII grossly intact. Intact sensation bilateral upper extremities.  Intact right lower extremity strength.  2-3 out of 5 left upper extremity deltoid strength with 4 out of 5 bicep and tricep and handgrip strength 1+ biceps triceps and brachioradialis reflexes. Negative Teran's. 2+ radial pulse. Negative Tinel's over the cubital and carpal tunnel. No skin lesions on the right and left upper extremities. [de-identified] : Review of his previous cervical MRI from stand-up radiology reveals multiple he level disc protrusions with some abutment of the anterior cord.  He does appear to have a left-sided C7-T1 disc herniation with lateral recess and foraminal stenosis
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
## 2023-09-11 ENCOUNTER — APPOINTMENT (OUTPATIENT)
Dept: UROLOGY | Facility: CLINIC | Age: 63
End: 2023-09-11
Payer: MEDICAID

## 2023-09-18 ENCOUNTER — APPOINTMENT (OUTPATIENT)
Dept: UROLOGY | Facility: CLINIC | Age: 63
End: 2023-09-18
Payer: MEDICAID

## 2023-09-18 ENCOUNTER — APPOINTMENT (OUTPATIENT)
Dept: ORTHOPEDIC SURGERY | Facility: CLINIC | Age: 63
End: 2023-09-18

## 2023-09-18 VITALS
RESPIRATION RATE: 18 BRPM | TEMPERATURE: 97.4 F | HEART RATE: 100 BPM | SYSTOLIC BLOOD PRESSURE: 113 MMHG | HEIGHT: 71 IN | BODY MASS INDEX: 26.6 KG/M2 | DIASTOLIC BLOOD PRESSURE: 83 MMHG | OXYGEN SATURATION: 99 % | WEIGHT: 190 LBS

## 2023-09-18 DIAGNOSIS — N52.9 MALE ERECTILE DYSFUNCTION, UNSPECIFIED: ICD-10-CM

## 2023-09-18 PROCEDURE — 99214 OFFICE O/P EST MOD 30 MIN: CPT

## 2023-09-19 LAB
APPEARANCE: ABNORMAL
BACTERIA: ABNORMAL /HPF
BILIRUBIN URINE: NEGATIVE
BLOOD URINE: ABNORMAL
CAST: 1 /LPF
COLOR: YELLOW
EPITHELIAL CELLS: 1 /HPF
GLUCOSE QUALITATIVE U: 500 MG/DL
KETONES URINE: NEGATIVE MG/DL
LEUKOCYTE ESTERASE URINE: ABNORMAL
MICROSCOPIC-UA: NORMAL
NITRITE URINE: POSITIVE
PH URINE: 6
PROTEIN URINE: NORMAL MG/DL
RED BLOOD CELLS URINE: 0 /HPF
SPECIFIC GRAVITY URINE: 1.02
UROBILINOGEN URINE: 1 MG/DL
WHITE BLOOD CELLS URINE: 950 /HPF

## 2023-09-20 ENCOUNTER — RESULT REVIEW (OUTPATIENT)
Age: 63
End: 2023-09-20

## 2023-09-22 LAB
BACTERIA UR CULT: ABNORMAL
URINE CYTOLOGY: NORMAL

## 2023-09-25 ENCOUNTER — OUTPATIENT (OUTPATIENT)
Dept: OUTPATIENT SERVICES | Facility: HOSPITAL | Age: 63
LOS: 1 days | End: 2023-09-25
Payer: MEDICAID

## 2023-09-25 ENCOUNTER — APPOINTMENT (OUTPATIENT)
Dept: CT IMAGING | Facility: CLINIC | Age: 63
End: 2023-09-25
Payer: MEDICAID

## 2023-09-25 DIAGNOSIS — Z00.8 ENCOUNTER FOR OTHER GENERAL EXAMINATION: ICD-10-CM

## 2023-09-25 DIAGNOSIS — R31.0 GROSS HEMATURIA: ICD-10-CM

## 2023-09-25 DIAGNOSIS — N40.0 BENIGN PROSTATIC HYPERPLASIA WITHOUT LOWER URINARY TRACT SYMPTOMS: Chronic | ICD-10-CM

## 2023-09-25 DIAGNOSIS — Z87.19 PERSONAL HISTORY OF OTHER DISEASES OF THE DIGESTIVE SYSTEM: Chronic | ICD-10-CM

## 2023-09-25 DIAGNOSIS — R52 PAIN, UNSPECIFIED: Chronic | ICD-10-CM

## 2023-09-25 PROCEDURE — 74178 CT ABD&PLV WO CNTR FLWD CNTR: CPT | Mod: 26

## 2023-09-25 PROCEDURE — 74178 CT ABD&PLV WO CNTR FLWD CNTR: CPT

## 2023-10-13 ENCOUNTER — APPOINTMENT (OUTPATIENT)
Dept: ORTHOPEDIC SURGERY | Facility: CLINIC | Age: 63
End: 2023-10-13

## 2023-10-20 ENCOUNTER — APPOINTMENT (OUTPATIENT)
Dept: UROLOGY | Facility: CLINIC | Age: 63
End: 2023-10-20

## 2023-11-06 ENCOUNTER — APPOINTMENT (OUTPATIENT)
Dept: ORTHOPEDIC SURGERY | Facility: CLINIC | Age: 63
End: 2023-11-06

## 2024-03-11 ENCOUNTER — APPOINTMENT (OUTPATIENT)
Dept: ORTHOPEDIC SURGERY | Facility: CLINIC | Age: 64
End: 2024-03-11

## 2024-03-13 ENCOUNTER — APPOINTMENT (OUTPATIENT)
Dept: ORTHOPEDIC SURGERY | Facility: CLINIC | Age: 64
End: 2024-03-13
Payer: MEDICAID

## 2024-03-13 VITALS
BODY MASS INDEX: 25.9 KG/M2 | WEIGHT: 185 LBS | SYSTOLIC BLOOD PRESSURE: 140 MMHG | OXYGEN SATURATION: 98 % | HEIGHT: 71 IN | DIASTOLIC BLOOD PRESSURE: 92 MMHG | HEART RATE: 100 BPM

## 2024-03-13 PROCEDURE — 99214 OFFICE O/P EST MOD 30 MIN: CPT

## 2024-03-13 NOTE — PHYSICAL EXAM
[Ataxic] : not ataxic [de-identified] : Examination of the cervical spine reveals no midline or paraspinal tenderness to palpation. No cervical lymphadenopathy. Decreased range of motion with respect to flexion, extension, rotation, and lateral bending.  Positive Spurlings. Negative Lhermitte's.  Decreased passive range of motion and active of the left shoulder with evidence of impingement. No instability of bilateral upper extremities.  Cranial nerves II through XII grossly intact. Intact sensation bilateral upper extremities.  Intact lower extremity strength.  On today's exam he is wasting of his intrinsic musculature of the left hand with 2 out of 5  and interosseous function 1+ biceps triceps and brachioradialis reflexes. Negative Teran's. 2+ radial pulse. Negative Tinel's over the cubital and carpal tunnel. No skin lesions on the right and left upper extremities. [de-identified] : Updated cervical spine MRI reveals left-sided C7-T1 disc herniation with some left-sided central stenosis and significant foraminal stenosis.  Thyroid nodule.

## 2024-03-13 NOTE — HISTORY OF PRESENT ILLNESS
[de-identified] : Mr. IVY GARCIA  is a 62 year old male who presents to the office for a follow-up visit.  He continues to have left arm pain and 4th/5th digit numbness.  He has not returned sooner because he was told to have his shoulder addressed before his spine.

## 2024-03-13 NOTE — DISCUSSION/SUMMARY
[de-identified] : We discussed further treatment options.  He has progressive left hand weakness despite conservative measures.  He has concordant pathology on the left at C7-T1.  We have discussed the nature purpose of a C7-T1 anterior cervical decompression and fusion.  He would like to proceed with this option.  CT scan will be obtained for preoperative planning.  He will also follow-up with his PCP regarding his thyroid nodule.  Follow-up for a preop appointment.

## 2024-03-25 ENCOUNTER — APPOINTMENT (OUTPATIENT)
Dept: CT IMAGING | Facility: CLINIC | Age: 64
End: 2024-03-25

## 2024-03-28 ENCOUNTER — OUTPATIENT (OUTPATIENT)
Dept: OUTPATIENT SERVICES | Facility: HOSPITAL | Age: 64
LOS: 1 days | End: 2024-03-28

## 2024-03-28 VITALS
RESPIRATION RATE: 16 BRPM | OXYGEN SATURATION: 99 % | HEART RATE: 80 BPM | TEMPERATURE: 98 F | HEIGHT: 69 IN | DIASTOLIC BLOOD PRESSURE: 80 MMHG | SYSTOLIC BLOOD PRESSURE: 120 MMHG | WEIGHT: 177.91 LBS

## 2024-03-28 DIAGNOSIS — E11.9 TYPE 2 DIABETES MELLITUS WITHOUT COMPLICATIONS: ICD-10-CM

## 2024-03-28 DIAGNOSIS — M48.02 SPINAL STENOSIS, CERVICAL REGION: ICD-10-CM

## 2024-03-28 DIAGNOSIS — Z87.19 PERSONAL HISTORY OF OTHER DISEASES OF THE DIGESTIVE SYSTEM: Chronic | ICD-10-CM

## 2024-03-28 DIAGNOSIS — Z98.890 OTHER SPECIFIED POSTPROCEDURAL STATES: Chronic | ICD-10-CM

## 2024-03-28 DIAGNOSIS — I10 ESSENTIAL (PRIMARY) HYPERTENSION: ICD-10-CM

## 2024-03-28 DIAGNOSIS — R52 PAIN, UNSPECIFIED: Chronic | ICD-10-CM

## 2024-03-28 DIAGNOSIS — N40.0 BENIGN PROSTATIC HYPERPLASIA WITHOUT LOWER URINARY TRACT SYMPTOMS: Chronic | ICD-10-CM

## 2024-03-28 LAB
A1C WITH ESTIMATED AVERAGE GLUCOSE RESULT: 7.6 % — HIGH (ref 4–5.6)
ANION GAP SERPL CALC-SCNC: 13 MMOL/L — SIGNIFICANT CHANGE UP (ref 7–14)
BLD GP AB SCN SERPL QL: NEGATIVE — SIGNIFICANT CHANGE UP
BUN SERPL-MCNC: 18 MG/DL — SIGNIFICANT CHANGE UP (ref 7–23)
CALCIUM SERPL-MCNC: 10.2 MG/DL — SIGNIFICANT CHANGE UP (ref 8.4–10.5)
CHLORIDE SERPL-SCNC: 104 MMOL/L — SIGNIFICANT CHANGE UP (ref 98–107)
CO2 SERPL-SCNC: 23 MMOL/L — SIGNIFICANT CHANGE UP (ref 22–31)
CREAT SERPL-MCNC: 1.44 MG/DL — HIGH (ref 0.5–1.3)
EGFR: 55 ML/MIN/1.73M2 — LOW
ESTIMATED AVERAGE GLUCOSE: 171 — SIGNIFICANT CHANGE UP
GLUCOSE SERPL-MCNC: 124 MG/DL — HIGH (ref 70–99)
HCT VFR BLD CALC: 40.5 % — SIGNIFICANT CHANGE UP (ref 39–50)
HGB BLD-MCNC: 14.2 G/DL — SIGNIFICANT CHANGE UP (ref 13–17)
MCHC RBC-ENTMCNC: 30.1 PG — SIGNIFICANT CHANGE UP (ref 27–34)
MCHC RBC-ENTMCNC: 35.1 GM/DL — SIGNIFICANT CHANGE UP (ref 32–36)
MCV RBC AUTO: 85.8 FL — SIGNIFICANT CHANGE UP (ref 80–100)
NRBC # BLD: 0 /100 WBCS — SIGNIFICANT CHANGE UP (ref 0–0)
NRBC # FLD: 0 K/UL — SIGNIFICANT CHANGE UP (ref 0–0)
PLATELET # BLD AUTO: 259 K/UL — SIGNIFICANT CHANGE UP (ref 150–400)
POTASSIUM SERPL-MCNC: 4.3 MMOL/L — SIGNIFICANT CHANGE UP (ref 3.5–5.3)
POTASSIUM SERPL-SCNC: 4.3 MMOL/L — SIGNIFICANT CHANGE UP (ref 3.5–5.3)
RBC # BLD: 4.72 M/UL — SIGNIFICANT CHANGE UP (ref 4.2–5.8)
RBC # FLD: 13 % — SIGNIFICANT CHANGE UP (ref 10.3–14.5)
RH IG SCN BLD-IMP: POSITIVE — SIGNIFICANT CHANGE UP
RH IG SCN BLD-IMP: POSITIVE — SIGNIFICANT CHANGE UP
SODIUM SERPL-SCNC: 140 MMOL/L — SIGNIFICANT CHANGE UP (ref 135–145)
WBC # BLD: 4.82 K/UL — SIGNIFICANT CHANGE UP (ref 3.8–10.5)
WBC # FLD AUTO: 4.82 K/UL — SIGNIFICANT CHANGE UP (ref 3.8–10.5)

## 2024-03-28 RX ORDER — SODIUM CHLORIDE 9 MG/ML
1000 INJECTION, SOLUTION INTRAVENOUS
Refills: 0 | Status: DISCONTINUED | OUTPATIENT
Start: 2024-04-04 | End: 2024-04-05

## 2024-03-28 RX ORDER — METFORMIN HYDROCHLORIDE 850 MG/1
1 TABLET ORAL
Qty: 0 | Refills: 0 | DISCHARGE

## 2024-03-28 NOTE — H&P PST ADULT - PROBLEM SELECTOR PLAN 1
Patient tentatively scheduled for C7/T1 anterior cervical decompression and fusion on 4/4/24.  Pre-op instructions provided. Pt given verbal and written instructions with teach back on chlorhexidine wash and pepcid. Pt verbalized understanding with return demonstration.   MACEY precautions    No recent labs as per patient and in chart   CBC, BMP, A1c, T&S, MRSA nasal swab done at [facility].

## 2024-03-28 NOTE — H&P PST ADULT - ATTENDING COMMENTS
the patient has severe nerve compression- the nature of this problem, the options available, the risks and possible complications of the various options of treatment including those of no treatment at all were discussed at length with the patient. The specific risks and possible complications of the planned surgery (anterior cervical decompression and fusion)  including but not limited to death, paralysis, nerve damage, infection, bleeding,  failure of the fusion, failure of the hardware, vocal cord paralysis, swallowing difficulties,  the possible need for further surgery in the future (posterior procedure, pseudoarthrosis) pulmonary and/or cardiac complications DVT, PE UTI etc etc were discussed at length with the patient who understands and wishes to proceed.

## 2024-03-28 NOTE — H&P PST ADULT - HISTORY OF PRESENT ILLNESS
63 year old male  with PMH of HTN, HLD, Type 2 DM, BPH presents to Presurgical testing with diagnosis of Spinal stenosis cervical region  scheduled for C7/T1 anterior cervical decompression and fusion.  Patient with left arm pain and 4th/5th digit numbness for the last one year.

## 2024-03-28 NOTE — H&P PST ADULT - NSICDXPASTSURGICALHX_GEN_ALL_CORE_FT
PAST SURGICAL HISTORY:  Enlarged prostate negatie biopsy    H/O cholelithiasis s/p lap cholecystectomy    History of back surgery     S/P left rotator cuff repair     S/P right rotator cuff repair

## 2024-03-28 NOTE — H&P PST ADULT - WEIGHT IN KG
03/19/24 1509   Interview Information   Person Interviewed Gladys   Relationship to Patient wife   Questions   Surgery Time Verified Yes  (1330)   Arrival Time Verified 1130   Surgery Location Verified Yes  ([facility])   Procedure Site Confirmed? No   Medical History Reviewed Yes   Does patient have any implanted/wearable devices?  None   Surgical Consent Signed No   Lab Work and EKGs Complete Yes   NPO Status Reinforced and Education Provided Yes  (Npo after MN. no gum, candy, mints, cough drops after MN. Clear liquids are ok after Mn and may continue until arrival at 1130)   Date instructed for last liquid consumption 03/21/24   Time instructed for last liquid consumption 1130   Date instructed for last solid food consumption 03/20/24   Time instructed for last solid food consumption 0369   Patient Knows to Bring Current Medication List Yes   Medication Instructions Given Yes  (hold losartan, furosemide the morning of. hold jardiance 3 Full days prior and the morning of.)   Reinforced Home Procedure Prep with Patient N/A   Instructed to Remove Nail Polish, Piercings, Jewelry, False Eyelashes/Hair Extensions (if appropriate), and Other Accessories No        80.7

## 2024-03-29 LAB
MRSA PCR RESULT.: SIGNIFICANT CHANGE UP
S AUREUS DNA NOSE QL NAA+PROBE: SIGNIFICANT CHANGE UP

## 2024-04-03 ENCOUNTER — TRANSCRIPTION ENCOUNTER (OUTPATIENT)
Age: 64
End: 2024-04-03

## 2024-04-03 NOTE — ASU PATIENT PROFILE, ADULT - IS PATIENT PREGNANT?
COVID-19 Outpatient Progress Note    Assessment/Plan:    Problem List Items Addressed This Visit    None  Visit Diagnoses       COVID-19    -  Primary           Disposition:     Patient with asymptomatic/mild COVID-19: They were recommended to isolate for at least 5 days (day 0 is the day symptoms appeared or the date the specimen was collected for the positive test for people who are asymptomatic). If they are asymptomatic or symptoms are improving with no fevers in the past 24 hours, isolation may be ended followed by 5 days of wearing a high quality mask when around others to minimize risk of infecting others. They should wear a mask through day 10 and a test-based strategy may be used to remove a mask sooner.      Discussed symptom directed medication options with patient. Discussed vitamin D, vitamin C, and/or zinc supplementation with patient.     Illness appears to be viral in nature.   Rest and fluids advised.   Educated that the course of this illness could be 2-4 weeks.   Discussed symptomatic relief, such as warm steam inhalations, tylenol/ibuprofen for fevers and body aches, rest, and drink plenty of fluids.  Warm salt gargles for sore throat.   Discussed red flag signs to go to the ER, such as chest pain or shortness of breath.   Return to the office for reevaluation if symptoms worsen or do not improve in 1-2 weeks       I have spent a total time of 15 minutes on the day of the encounter for this patient including       Encounter provider: IAIN Ramos     Provider located at: [facility]  [address] [place] [id number]     Recent Visits  No visits were found meeting these conditions.  Showing recent visits within past 7 days and meeting all other requirements  Today's Visits  Date Type Provider Dept   02/07/24 Telemedicine IAIN Ramos [facility]    Showing today's visits and meeting all other requirements  Future Appointments  No visits were found meeting these conditions.  Showing future appointments within next 150 days and meeting all other requirements     This virtual check-in was done via [employer] Embedded and patient was informed that this is a secure, HIPAA-compliant platform. He agrees to proceed.    Patient agrees to participate in a virtual check in via telephone or video visit instead of presenting to the office to address urgent/immediate medical needs. Patient is aware this is a billable service. He acknowledged consent and understanding of privacy and security of the video platform. The patient has agreed to participate and understands they can discontinue the visit at any time.    After connecting through [employer], the patient was identified by name and date of birth. Lester Milner was informed that this was a telemedicine visit and that the exam was being conducted confidentially over secure lines. My office door was closed. No one else was in the room. Lester Milner acknowledged consent and understanding of privacy and security of the telemedicine visit. I informed the patient that I have reviewed his record in Epic and presented the opportunity for him to ask any questions regarding the visit today. The patient agreed to participate.     Verification of patient location:  Patient is located in the following state in which I hold an active license: [place]    Subjective:   Lester Milner is a 26 y.o. male who has been screened for COVID-19. Symptom change since last report: worsening. Patient's symptoms include fever (102 Tmax), chills, fatigue, nasal congestion, sore throat and myalgias. Patient denies rhinorrhea, cough, shortness of breath, chest tightness, abdominal pain, nausea, vomiting, diarrhea and headaches.     - Date of symptom onset: 2/5/2024  - Date of positive COVID-19 test: 2/5/2024. Type of test: Home antigen.  "    COVID-19 vaccination status: Fully vaccinated with booster    Lester has been staying home and has isolated themselves in his home. He is taking care to not share personal items and is cleaning all surfaces that are touched often, like counters, tabletops, and doorknobs using household cleaning sprays or wipes. He is wearing a mask when he leaves his room.     Her reports that his HR is around 110     Has been taking Advil      No results found for: \"SARSCOV2\", \"KMRNKMA7PUT\", \"SARSCORONAVI\", \"CORONAVIRUSR\", \"SARSCOVAG\", \"SARSCOVAGH\"    Review of Systems   Constitutional:  Positive for chills, fatigue and fever (102 Tmax).   HENT:  Positive for congestion and sore throat. Negative for rhinorrhea.    Respiratory:  Negative for cough, chest tightness and shortness of breath.    Gastrointestinal:  Negative for abdominal pain, blood in stool, diarrhea, nausea and vomiting.   Genitourinary:  Negative for hematuria.   Musculoskeletal:  Positive for myalgias.   Neurological:  Negative for headaches.     Current Outpatient Medications on File Prior to Visit   Medication Sig    albuterol (2.5 mg/3 mL) 0.083 % nebulizer solution Take 1 vial (2.5 mg total) by nebulization every 6 (six) hours as needed for wheezing or shortness of breath    famotidine (PEPCID) 20 mg tablet Take 1 tablet (20 mg total) by mouth 2 (two) times a day    hydrOXYzine HCL (ATARAX) 25 mg tablet Take 1 tablet (25 mg total) by mouth every 6 (six) hours as needed for anxiety    propranolol (INDERAL) 20 mg tablet Take 1 tablet (20 mg total) by mouth every 12 (twelve) hours       Objective:    Temp 100 °F (37.8 °C) (Tympanic)   Ht 5' 7\" (1.702 m)   Wt 61.2 kg (135 lb)   BMI 21.14 kg/m²        Physical Exam  Vitals reviewed.   Constitutional:       Appearance: Normal appearance.   Pulmonary:      Effort: No respiratory distress.   Neurological:      General: No focal deficit present.      Mental Status: He is alert and oriented to person, place, and " not applicable (Male) time.   Psychiatric:         Mood and Affect: Mood normal.         Behavior: Behavior normal.       IAIN Ramos

## 2024-04-03 NOTE — ASU PATIENT PROFILE, ADULT - FALL HARM RISK - UNIVERSAL INTERVENTIONS
Bed in lowest position, wheels locked, appropriate side rails in place/Call bell, personal items and telephone in reach/Instruct patient to call for assistance before getting out of bed or chair/[place] to call system/Physically safe environment - no spills, clutter or unnecessary equipment/Purposeful Proactive Rounding/Room/bathroom lighting operational, light cord in reach

## 2024-04-04 ENCOUNTER — INPATIENT (INPATIENT)
Facility: HOSPITAL | Age: 64
LOS: 3 days | Discharge: HOME | End: 2024-04-08
Attending: STUDENT IN AN ORGANIZED HEALTH CARE EDUCATION/TRAINING PROGRAM | Admitting: STUDENT IN AN ORGANIZED HEALTH CARE EDUCATION/TRAINING PROGRAM
Payer: MEDICAID

## 2024-04-04 ENCOUNTER — RESULT REVIEW (OUTPATIENT)
Age: 64
End: 2024-04-04

## 2024-04-04 ENCOUNTER — APPOINTMENT (OUTPATIENT)
Dept: ORTHOPEDIC SURGERY | Facility: HOSPITAL | Age: 64
End: 2024-04-04

## 2024-04-04 VITALS
DIASTOLIC BLOOD PRESSURE: 91 MMHG | SYSTOLIC BLOOD PRESSURE: 134 MMHG | HEIGHT: 69 IN | WEIGHT: 177.91 LBS | TEMPERATURE: 98 F | RESPIRATION RATE: 16 BRPM | OXYGEN SATURATION: 99 % | HEART RATE: 89 BPM

## 2024-04-04 DIAGNOSIS — Z87.19 PERSONAL HISTORY OF OTHER DISEASES OF THE DIGESTIVE SYSTEM: Chronic | ICD-10-CM

## 2024-04-04 DIAGNOSIS — Z98.890 OTHER SPECIFIED POSTPROCEDURAL STATES: Chronic | ICD-10-CM

## 2024-04-04 DIAGNOSIS — M48.02 SPINAL STENOSIS, CERVICAL REGION: ICD-10-CM

## 2024-04-04 DIAGNOSIS — N40.0 BENIGN PROSTATIC HYPERPLASIA WITHOUT LOWER URINARY TRACT SYMPTOMS: Chronic | ICD-10-CM

## 2024-04-04 LAB
GLUCOSE BLDC GLUCOMTR-MCNC: 150 MG/DL — HIGH (ref 70–99)
GLUCOSE BLDC GLUCOMTR-MCNC: 152 MG/DL — HIGH (ref 70–99)
GLUCOSE BLDC GLUCOMTR-MCNC: 169 MG/DL — HIGH (ref 70–99)
GLUCOSE BLDC GLUCOMTR-MCNC: 182 MG/DL — HIGH (ref 70–99)

## 2024-04-04 PROCEDURE — 22551 ARTHRD ANT NTRBDY CERVICAL: CPT | Mod: 62

## 2024-04-04 PROCEDURE — 22551 ARTHRD ANT NTRBDY CERVICAL: CPT | Mod: GC,62

## 2024-04-04 PROCEDURE — 22845 INSERT SPINE FIXATION DEVICE: CPT | Mod: 59

## 2024-04-04 PROCEDURE — 22853 INSJ BIOMECHANICAL DEVICE: CPT

## 2024-04-04 PROCEDURE — 88304 TISSUE EXAM BY PATHOLOGIST: CPT | Mod: 26

## 2024-04-04 DEVICE — PLATE 1 LVL 20MM: Type: IMPLANTABLE DEVICE | Status: FUNCTIONAL

## 2024-04-04 DEVICE — LIGATING CLIPS WECK HORIZON SMALL-WIDE (RED) 24: Type: IMPLANTABLE DEVICE | Status: FUNCTIONAL

## 2024-04-04 DEVICE — BONE WAX 2.5GM: Type: IMPLANTABLE DEVICE | Status: FUNCTIONAL

## 2024-04-04 DEVICE — SURGIFLO MATRIX WITH THROMBIN KIT: Type: IMPLANTABLE DEVICE | Status: FUNCTIONAL

## 2024-04-04 DEVICE — LIGATING CLIPS WECK HORIZON MEDIUM (BLUE) 24: Type: IMPLANTABLE DEVICE | Status: FUNCTIONAL

## 2024-04-04 DEVICE — PIN DISTRACTOR CERV 14MMDISP: Type: IMPLANTABLE DEVICE | Status: FUNCTIONAL

## 2024-04-04 DEVICE — SCREW SELF TAP VAR 4X14MM: Type: IMPLANTABLE DEVICE | Status: FUNCTIONAL

## 2024-04-04 DEVICE — SURGIFOAM PAD 8CM X 12.5CM X 2MM (100C): Type: IMPLANTABLE DEVICE | Status: FUNCTIONAL

## 2024-04-04 DEVICE — SPACER CASCADIA 7 DEG 7X16X13MM: Type: IMPLANTABLE DEVICE | Status: FUNCTIONAL

## 2024-04-04 RX ORDER — SODIUM CHLORIDE 9 MG/ML
1000 INJECTION, SOLUTION INTRAVENOUS
Refills: 0 | Status: DISCONTINUED | OUTPATIENT
Start: 2024-04-04 | End: 2024-04-08

## 2024-04-04 RX ORDER — SODIUM CHLORIDE 9 MG/ML
1000 INJECTION, SOLUTION INTRAVENOUS
Refills: 0 | Status: DISCONTINUED | OUTPATIENT
Start: 2024-04-04 | End: 2024-04-05

## 2024-04-04 RX ORDER — TRAMADOL HYDROCHLORIDE 50 MG/1
1 TABLET ORAL
Refills: 0 | DISCHARGE

## 2024-04-04 RX ORDER — ONDANSETRON 8 MG/1
4 TABLET, FILM COATED ORAL EVERY 6 HOURS
Refills: 0 | Status: DISCONTINUED | OUTPATIENT
Start: 2024-04-04 | End: 2024-04-08

## 2024-04-04 RX ORDER — CHLORHEXIDINE GLUCONATE 213 G/1000ML
1 SOLUTION TOPICAL ONCE
Refills: 0 | Status: COMPLETED | OUTPATIENT
Start: 2024-04-04 | End: 2024-04-04

## 2024-04-04 RX ORDER — DEXTROSE 50 % IN WATER 50 %
15 SYRINGE (ML) INTRAVENOUS ONCE
Refills: 0 | Status: DISCONTINUED | OUTPATIENT
Start: 2024-04-04 | End: 2024-04-08

## 2024-04-04 RX ORDER — HYDROCHLOROTHIAZIDE 25 MG
1 TABLET ORAL
Refills: 0 | DISCHARGE

## 2024-04-04 RX ORDER — OXYCODONE HYDROCHLORIDE 5 MG/1
5 TABLET ORAL EVERY 6 HOURS
Refills: 0 | Status: DISCONTINUED | OUTPATIENT
Start: 2024-04-04 | End: 2024-04-08

## 2024-04-04 RX ORDER — MAGNESIUM HYDROXIDE 400 MG/1
30 TABLET, CHEWABLE ORAL EVERY 12 HOURS
Refills: 0 | Status: DISCONTINUED | OUTPATIENT
Start: 2024-04-04 | End: 2024-04-08

## 2024-04-04 RX ORDER — PANTOPRAZOLE SODIUM 20 MG/1
40 TABLET, DELAYED RELEASE ORAL
Refills: 0 | Status: DISCONTINUED | OUTPATIENT
Start: 2024-04-04 | End: 2024-04-08

## 2024-04-04 RX ORDER — SEMAGLUTIDE 0.68 MG/ML
2 INJECTION, SOLUTION SUBCUTANEOUS
Refills: 0 | DISCHARGE

## 2024-04-04 RX ORDER — CEFAZOLIN SODIUM 1 G
2000 VIAL (EA) INJECTION EVERY 8 HOURS
Refills: 0 | Status: COMPLETED | OUTPATIENT
Start: 2024-04-04 | End: 2024-04-05

## 2024-04-04 RX ORDER — ACETAMINOPHEN 500 MG
975 TABLET ORAL ONCE
Refills: 0 | Status: COMPLETED | OUTPATIENT
Start: 2024-04-04 | End: 2024-04-04

## 2024-04-04 RX ORDER — ACETAMINOPHEN 500 MG
1000 TABLET ORAL ONCE
Refills: 0 | Status: COMPLETED | OUTPATIENT
Start: 2024-04-04 | End: 2024-04-04

## 2024-04-04 RX ORDER — HYDROMORPHONE HYDROCHLORIDE 2 MG/ML
0.5 INJECTION INTRAMUSCULAR; INTRAVENOUS; SUBCUTANEOUS
Refills: 0 | Status: DISCONTINUED | OUTPATIENT
Start: 2024-04-04 | End: 2024-04-04

## 2024-04-04 RX ORDER — POLYETHYLENE GLYCOL 3350 17 G/17G
17 POWDER, FOR SOLUTION ORAL DAILY
Refills: 0 | Status: DISCONTINUED | OUTPATIENT
Start: 2024-04-04 | End: 2024-04-08

## 2024-04-04 RX ORDER — DEXTROSE 50 % IN WATER 50 %
12.5 SYRINGE (ML) INTRAVENOUS ONCE
Refills: 0 | Status: DISCONTINUED | OUTPATIENT
Start: 2024-04-04 | End: 2024-04-08

## 2024-04-04 RX ORDER — INSULIN LISPRO 100/ML
VIAL (ML) SUBCUTANEOUS AT BEDTIME
Refills: 0 | Status: DISCONTINUED | OUTPATIENT
Start: 2024-04-04 | End: 2024-04-08

## 2024-04-04 RX ORDER — TRAMADOL HYDROCHLORIDE 50 MG/1
50 TABLET ORAL ONCE
Refills: 0 | Status: DISCONTINUED | OUTPATIENT
Start: 2024-04-04 | End: 2024-04-04

## 2024-04-04 RX ORDER — OXYCODONE HYDROCHLORIDE 5 MG/1
10 TABLET ORAL EVERY 4 HOURS
Refills: 0 | Status: DISCONTINUED | OUTPATIENT
Start: 2024-04-04 | End: 2024-04-08

## 2024-04-04 RX ORDER — TRAMADOL HYDROCHLORIDE 50 MG/1
50 TABLET ORAL EVERY 6 HOURS
Refills: 0 | Status: DISCONTINUED | OUTPATIENT
Start: 2024-04-04 | End: 2024-04-08

## 2024-04-04 RX ORDER — GLUCAGON INJECTION, SOLUTION 0.5 MG/.1ML
1 INJECTION, SOLUTION SUBCUTANEOUS ONCE
Refills: 0 | Status: DISCONTINUED | OUTPATIENT
Start: 2024-04-04 | End: 2024-04-08

## 2024-04-04 RX ORDER — NALOXEGOL OXALATE 12.5 MG/1
1 TABLET, FILM COATED ORAL
Refills: 0 | DISCHARGE

## 2024-04-04 RX ORDER — CYCLOBENZAPRINE HYDROCHLORIDE 10 MG/1
10 TABLET, FILM COATED ORAL EVERY 8 HOURS
Refills: 0 | Status: DISCONTINUED | OUTPATIENT
Start: 2024-04-04 | End: 2024-04-08

## 2024-04-04 RX ORDER — DEXTROSE 10 % IN WATER 10 %
125 INTRAVENOUS SOLUTION INTRAVENOUS ONCE
Refills: 0 | Status: DISCONTINUED | OUTPATIENT
Start: 2024-04-04 | End: 2024-04-08

## 2024-04-04 RX ORDER — DEXTROSE 50 % IN WATER 50 %
25 SYRINGE (ML) INTRAVENOUS ONCE
Refills: 0 | Status: DISCONTINUED | OUTPATIENT
Start: 2024-04-04 | End: 2024-04-08

## 2024-04-04 RX ORDER — PANTOPRAZOLE SODIUM 20 MG/1
40 TABLET, DELAYED RELEASE ORAL ONCE
Refills: 0 | Status: COMPLETED | OUTPATIENT
Start: 2024-04-04 | End: 2024-04-04

## 2024-04-04 RX ORDER — SENNA PLUS 8.6 MG/1
2 TABLET ORAL AT BEDTIME
Refills: 0 | Status: DISCONTINUED | OUTPATIENT
Start: 2024-04-04 | End: 2024-04-08

## 2024-04-04 RX ORDER — AMLODIPINE BESYLATE 2.5 MG/1
1 TABLET ORAL
Refills: 0 | DISCHARGE

## 2024-04-04 RX ORDER — INSULIN LISPRO 100/ML
VIAL (ML) SUBCUTANEOUS
Refills: 0 | Status: DISCONTINUED | OUTPATIENT
Start: 2024-04-04 | End: 2024-04-08

## 2024-04-04 RX ORDER — AMLODIPINE BESYLATE 2.5 MG/1
2.5 TABLET ORAL DAILY
Refills: 0 | Status: DISCONTINUED | OUTPATIENT
Start: 2024-04-04 | End: 2024-04-08

## 2024-04-04 RX ORDER — ACETAMINOPHEN 500 MG
975 TABLET ORAL EVERY 8 HOURS
Refills: 0 | Status: DISCONTINUED | OUTPATIENT
Start: 2024-04-04 | End: 2024-04-08

## 2024-04-04 RX ADMIN — TRAMADOL HYDROCHLORIDE 50 MILLIGRAM(S): 50 TABLET ORAL at 07:28

## 2024-04-04 RX ADMIN — PANTOPRAZOLE SODIUM 40 MILLIGRAM(S): 20 TABLET, DELAYED RELEASE ORAL at 22:47

## 2024-04-04 RX ADMIN — HYDROMORPHONE HYDROCHLORIDE 0.5 MILLIGRAM(S): 2 INJECTION INTRAMUSCULAR; INTRAVENOUS; SUBCUTANEOUS at 13:35

## 2024-04-04 RX ADMIN — CHLORHEXIDINE GLUCONATE 1 APPLICATION(S): 213 SOLUTION TOPICAL at 07:28

## 2024-04-04 RX ADMIN — Medication 975 MILLIGRAM(S): at 23:47

## 2024-04-04 RX ADMIN — AMLODIPINE BESYLATE 2.5 MILLIGRAM(S): 2.5 TABLET ORAL at 22:47

## 2024-04-04 RX ADMIN — Medication 975 MILLIGRAM(S): at 22:47

## 2024-04-04 RX ADMIN — HYDROMORPHONE HYDROCHLORIDE 0.5 MILLIGRAM(S): 2 INJECTION INTRAMUSCULAR; INTRAVENOUS; SUBCUTANEOUS at 13:18

## 2024-04-04 RX ADMIN — CYCLOBENZAPRINE HYDROCHLORIDE 10 MILLIGRAM(S): 10 TABLET, FILM COATED ORAL at 22:47

## 2024-04-04 RX ADMIN — SENNA PLUS 2 TABLET(S): 8.6 TABLET ORAL at 22:47

## 2024-04-04 RX ADMIN — Medication 975 MILLIGRAM(S): at 07:28

## 2024-04-04 RX ADMIN — TRAMADOL HYDROCHLORIDE 50 MILLIGRAM(S): 50 TABLET ORAL at 23:33

## 2024-04-04 RX ADMIN — Medication 400 MILLIGRAM(S): at 14:48

## 2024-04-04 RX ADMIN — Medication 100 MILLIGRAM(S): at 19:45

## 2024-04-04 RX ADMIN — Medication 150 MILLIGRAM(S): at 07:28

## 2024-04-04 RX ADMIN — CYCLOBENZAPRINE HYDROCHLORIDE 10 MILLIGRAM(S): 10 TABLET, FILM COATED ORAL at 14:48

## 2024-04-04 RX ADMIN — PANTOPRAZOLE SODIUM 40 MILLIGRAM(S): 20 TABLET, DELAYED RELEASE ORAL at 07:27

## 2024-04-04 RX ADMIN — TRAMADOL HYDROCHLORIDE 50 MILLIGRAM(S): 50 TABLET ORAL at 18:20

## 2024-04-04 NOTE — ASU PREOP CHECKLIST - SELECT TESTS ORDERED
Fs - 120 @ 7:10 am/CBC/CMP/Type and Cross/Type and Screen/EKG/POCT Blood Glucose Fs - 120 @ 7:10 am  // A1C 7.6 from PST/CBC/CMP/Type and Cross/Type and Screen/EKG/POCT Blood Glucose

## 2024-04-04 NOTE — ASU PREOP CHECKLIST - 3.
Full body skin check done on Admit to ASU - Full body skin check done on Admit to ASU - no breakdown noted

## 2024-04-04 NOTE — PATIENT PROFILE ADULT - FALL HARM RISK - HARM RISK INTERVENTIONS

## 2024-04-04 NOTE — PATIENT PROFILE ADULT - NSPRESCRUSEDDRG_GEN_A_NUR
SUBJECTIVE / OVERNIGHT EVENTS: No nausea, vomiting or diarrhea, no fever or chills, no dizziness or chest pain, no dysuria or hematuria .      CAPILLARY BLOOD GLUCOSE        I&O's Summary    21 Aug 2017 07:01  -  22 Aug 2017 07:00  --------------------------------------------------------  IN: 600 mL / OUT: 4 mL / NET: 596 mL        PHYSICAL EXAM:  HEAD:  Atraumatic, Normocephalic  EYES: EOMI, PERRLA, conjunctiva and sclera clear  NECK: Supple, No JVD  CHEST/LUNG: Clear to auscultation bilaterally; No wheeze  HEART: Regular rate and rhythm; No murmurs, rubs, or gallops  ABDOMEN: Soft, Nontender, Nondistended; Bowel sounds present  EXTREMITIES:  2+ Peripheral Pulses, No clubbing, cyanosis, or edema  PSYCH: AAOx3  NEUROLOGY: non-focal  SKIN: No rashes or lesions    MEDICATIONS  (STANDING):  tamsulosin 0.4 milliGRAM(s) Oral at bedtime  atorvastatin 20 milliGRAM(s) Oral at bedtime  metoprolol 25 milliGRAM(s) Oral two times a day  HYDROcodone/homatropine Syrup 5 milliLiter(s) Oral three times a day  amLODIPine   Tablet 2.5 milliGRAM(s) Oral daily    MEDICATIONS  (PRN):  zaleplon 5 milliGRAM(s) Oral at bedtime PRN Insomnia      LABS:                        15.1   8.25  )-----------( 214      ( 22 Aug 2017 07:15 )             44.1     08-22    140  |  101  |  14  ----------------------------<  106<H>  4.7   |  26  |  1.26    Ca    9.3      22 Aug 2017 07:21      PT/INR - ( 21 Aug 2017 13:40 )   PT: 10.6 sec;   INR: 0.98 ratio         PTT - ( 21 Aug 2017 13:40 )  PTT:29.2 sec                Thyroid Stimulating Hormone, Serum: 3.85 uIU/mL (08-22 @ 07:39)      Cultures:    EKG:    Radiological Studies:    Consultant(s) Notes Reviewed:      Care Discussed with Consultants/Other Providers: No

## 2024-04-05 ENCOUNTER — TRANSCRIPTION ENCOUNTER (OUTPATIENT)
Age: 64
End: 2024-04-05

## 2024-04-05 DIAGNOSIS — N40.0 BENIGN PROSTATIC HYPERPLASIA WITHOUT LOWER URINARY TRACT SYMPTOMS: ICD-10-CM

## 2024-04-05 DIAGNOSIS — R33.9 RETENTION OF URINE, UNSPECIFIED: ICD-10-CM

## 2024-04-05 LAB
ANION GAP SERPL CALC-SCNC: 15 MMOL/L — HIGH (ref 7–14)
APPEARANCE UR: CLEAR — SIGNIFICANT CHANGE UP
BASOPHILS # BLD AUTO: 0.02 K/UL — SIGNIFICANT CHANGE UP (ref 0–0.2)
BASOPHILS NFR BLD AUTO: 0.2 % — SIGNIFICANT CHANGE UP (ref 0–2)
BILIRUB UR-MCNC: NEGATIVE — SIGNIFICANT CHANGE UP
BUN SERPL-MCNC: 13 MG/DL — SIGNIFICANT CHANGE UP (ref 7–23)
CALCIUM SERPL-MCNC: 9.9 MG/DL — SIGNIFICANT CHANGE UP (ref 8.4–10.5)
CHLORIDE SERPL-SCNC: 103 MMOL/L — SIGNIFICANT CHANGE UP (ref 98–107)
CO2 SERPL-SCNC: 22 MMOL/L — SIGNIFICANT CHANGE UP (ref 22–31)
COLOR SPEC: YELLOW — SIGNIFICANT CHANGE UP
CREAT SERPL-MCNC: 1.23 MG/DL — SIGNIFICANT CHANGE UP (ref 0.5–1.3)
DIFF PNL FLD: NEGATIVE — SIGNIFICANT CHANGE UP
EGFR: 67 ML/MIN/1.73M2 — SIGNIFICANT CHANGE UP
EOSINOPHIL # BLD AUTO: 0.05 K/UL — SIGNIFICANT CHANGE UP (ref 0–0.5)
EOSINOPHIL NFR BLD AUTO: 0.6 % — SIGNIFICANT CHANGE UP (ref 0–6)
GLUCOSE BLDC GLUCOMTR-MCNC: 124 MG/DL — HIGH (ref 70–99)
GLUCOSE BLDC GLUCOMTR-MCNC: 140 MG/DL — HIGH (ref 70–99)
GLUCOSE BLDC GLUCOMTR-MCNC: 158 MG/DL — HIGH (ref 70–99)
GLUCOSE BLDC GLUCOMTR-MCNC: 187 MG/DL — HIGH (ref 70–99)
GLUCOSE SERPL-MCNC: 148 MG/DL — HIGH (ref 70–99)
GLUCOSE UR QL: 500 MG/DL
HCT VFR BLD CALC: 41.2 % — SIGNIFICANT CHANGE UP (ref 39–50)
HGB BLD-MCNC: 14.3 G/DL — SIGNIFICANT CHANGE UP (ref 13–17)
IANC: 6.53 K/UL — SIGNIFICANT CHANGE UP (ref 1.8–7.4)
IMM GRANULOCYTES NFR BLD AUTO: 0.3 % — SIGNIFICANT CHANGE UP (ref 0–0.9)
KETONES UR-MCNC: NEGATIVE MG/DL — SIGNIFICANT CHANGE UP
LEUKOCYTE ESTERASE UR-ACNC: NEGATIVE — SIGNIFICANT CHANGE UP
LYMPHOCYTES # BLD AUTO: 1.45 K/UL — SIGNIFICANT CHANGE UP (ref 1–3.3)
LYMPHOCYTES # BLD AUTO: 16.3 % — SIGNIFICANT CHANGE UP (ref 13–44)
MCHC RBC-ENTMCNC: 29.7 PG — SIGNIFICANT CHANGE UP (ref 27–34)
MCHC RBC-ENTMCNC: 34.7 GM/DL — SIGNIFICANT CHANGE UP (ref 32–36)
MCV RBC AUTO: 85.7 FL — SIGNIFICANT CHANGE UP (ref 80–100)
MONOCYTES # BLD AUTO: 0.8 K/UL — SIGNIFICANT CHANGE UP (ref 0–0.9)
MONOCYTES NFR BLD AUTO: 9 % — SIGNIFICANT CHANGE UP (ref 2–14)
NEUTROPHILS # BLD AUTO: 6.53 K/UL — SIGNIFICANT CHANGE UP (ref 1.8–7.4)
NEUTROPHILS NFR BLD AUTO: 73.6 % — SIGNIFICANT CHANGE UP (ref 43–77)
NITRITE UR-MCNC: NEGATIVE — SIGNIFICANT CHANGE UP
NRBC # BLD: 0 /100 WBCS — SIGNIFICANT CHANGE UP (ref 0–0)
NRBC # FLD: 0 K/UL — SIGNIFICANT CHANGE UP (ref 0–0)
PH UR: 6 — SIGNIFICANT CHANGE UP (ref 5–8)
PLATELET # BLD AUTO: 266 K/UL — SIGNIFICANT CHANGE UP (ref 150–400)
POTASSIUM SERPL-MCNC: 4.1 MMOL/L — SIGNIFICANT CHANGE UP (ref 3.5–5.3)
POTASSIUM SERPL-SCNC: 4.1 MMOL/L — SIGNIFICANT CHANGE UP (ref 3.5–5.3)
PROT UR-MCNC: NEGATIVE MG/DL — SIGNIFICANT CHANGE UP
RBC # BLD: 4.81 M/UL — SIGNIFICANT CHANGE UP (ref 4.2–5.8)
RBC # FLD: 12.6 % — SIGNIFICANT CHANGE UP (ref 10.3–14.5)
SODIUM SERPL-SCNC: 140 MMOL/L — SIGNIFICANT CHANGE UP (ref 135–145)
SP GR SPEC: 1.01 — SIGNIFICANT CHANGE UP (ref 1–1.03)
UROBILINOGEN FLD QL: 0.2 MG/DL — SIGNIFICANT CHANGE UP (ref 0.2–1)
WBC # BLD: 8.88 K/UL — SIGNIFICANT CHANGE UP (ref 3.8–10.5)
WBC # FLD AUTO: 8.88 K/UL — SIGNIFICANT CHANGE UP (ref 3.8–10.5)

## 2024-04-05 PROCEDURE — 99223 1ST HOSP IP/OBS HIGH 75: CPT

## 2024-04-05 RX ORDER — TAMSULOSIN HYDROCHLORIDE 0.4 MG/1
0.4 CAPSULE ORAL AT BEDTIME
Refills: 0 | Status: DISCONTINUED | OUTPATIENT
Start: 2024-04-05 | End: 2024-04-08

## 2024-04-05 RX ADMIN — TRAMADOL HYDROCHLORIDE 50 MILLIGRAM(S): 50 TABLET ORAL at 00:23

## 2024-04-05 RX ADMIN — CYCLOBENZAPRINE HYDROCHLORIDE 10 MILLIGRAM(S): 10 TABLET, FILM COATED ORAL at 21:20

## 2024-04-05 RX ADMIN — TRAMADOL HYDROCHLORIDE 50 MILLIGRAM(S): 50 TABLET ORAL at 23:56

## 2024-04-05 RX ADMIN — POLYETHYLENE GLYCOL 3350 17 GRAM(S): 17 POWDER, FOR SOLUTION ORAL at 13:02

## 2024-04-05 RX ADMIN — Medication 1: at 11:29

## 2024-04-05 RX ADMIN — Medication 975 MILLIGRAM(S): at 06:35

## 2024-04-05 RX ADMIN — OXYCODONE HYDROCHLORIDE 10 MILLIGRAM(S): 5 TABLET ORAL at 09:23

## 2024-04-05 RX ADMIN — OXYCODONE HYDROCHLORIDE 10 MILLIGRAM(S): 5 TABLET ORAL at 10:23

## 2024-04-05 RX ADMIN — CYCLOBENZAPRINE HYDROCHLORIDE 10 MILLIGRAM(S): 10 TABLET, FILM COATED ORAL at 13:02

## 2024-04-05 RX ADMIN — Medication 975 MILLIGRAM(S): at 14:02

## 2024-04-05 RX ADMIN — TAMSULOSIN HYDROCHLORIDE 0.4 MILLIGRAM(S): 0.4 CAPSULE ORAL at 21:19

## 2024-04-05 RX ADMIN — Medication 975 MILLIGRAM(S): at 05:35

## 2024-04-05 RX ADMIN — TRAMADOL HYDROCHLORIDE 50 MILLIGRAM(S): 50 TABLET ORAL at 19:03

## 2024-04-05 RX ADMIN — Medication 975 MILLIGRAM(S): at 13:02

## 2024-04-05 RX ADMIN — CYCLOBENZAPRINE HYDROCHLORIDE 10 MILLIGRAM(S): 10 TABLET, FILM COATED ORAL at 05:35

## 2024-04-05 RX ADMIN — TRAMADOL HYDROCHLORIDE 50 MILLIGRAM(S): 50 TABLET ORAL at 05:35

## 2024-04-05 RX ADMIN — AMLODIPINE BESYLATE 2.5 MILLIGRAM(S): 2.5 TABLET ORAL at 05:36

## 2024-04-05 RX ADMIN — TRAMADOL HYDROCHLORIDE 50 MILLIGRAM(S): 50 TABLET ORAL at 11:29

## 2024-04-05 RX ADMIN — PANTOPRAZOLE SODIUM 40 MILLIGRAM(S): 20 TABLET, DELAYED RELEASE ORAL at 05:35

## 2024-04-05 RX ADMIN — TRAMADOL HYDROCHLORIDE 50 MILLIGRAM(S): 50 TABLET ORAL at 18:03

## 2024-04-05 RX ADMIN — TRAMADOL HYDROCHLORIDE 50 MILLIGRAM(S): 50 TABLET ORAL at 06:35

## 2024-04-05 RX ADMIN — Medication 100 MILLIGRAM(S): at 03:14

## 2024-04-05 RX ADMIN — TRAMADOL HYDROCHLORIDE 50 MILLIGRAM(S): 50 TABLET ORAL at 12:29

## 2024-04-05 RX ADMIN — Medication 1: at 16:55

## 2024-04-05 NOTE — DISCHARGE NOTE NURSING/CASE MANAGEMENT/SOCIAL WORK - NSDCPNINST_GEN_ALL_CORE
You have a postop appointment with Dr Marion on April 1th 2024 @10:15AM.  Notify Dr Marion if you experience any increase in pain not relieved with medication, any redness, drainage or swelling around incision or any fever >100.5.  Drink plenty of fluids.  No heavy lifting or straining. Maintain proper body alignment, no bending or twisting motions.   Use over the counter stool softeners to assist with constipation.

## 2024-04-05 NOTE — PHYSICAL THERAPY INITIAL EVALUATION ADULT - GENERAL OBSERVATIONS, REHAB EVAL
Patient found semi-reclined in bed NAD, A&Ox4, +cervical collar, +anterior neck dressing clean, dry, and intact, +hemovac, +light touch sensation intact in all four extremities, patient OK for PT per RN Jhoan, patient agreeable to participate in PT evaluation, spO2 100% on room air

## 2024-04-05 NOTE — OCCUPATIONAL THERAPY INITIAL EVALUATION ADULT - GENERAL OBSERVATIONS, REHAB EVAL
Patient received semisupine in bed in NAD; agreeable to participate in OT evaluation. +cervical collar. +Velasco.

## 2024-04-05 NOTE — CONSULT NOTE ADULT - PROBLEM SELECTOR RECOMMENDATION 9
- s/p C7-T1 disectomy/fusion on 4/4  - Pain control with ultram, OxyIR PRN, Flexeril  - Bowel regiment - Senna/Miralax  - Surgical site management as per ortho  - PT/OT eval for safe dispo  - VTE ppx - IPC

## 2024-04-05 NOTE — DISCHARGE NOTE NURSING/CASE MANAGEMENT/SOCIAL WORK - PATIENT PORTAL LINK FT
You can access the FollowMyHealth Patient Portal offered by [facility] by registering at the following website: [email or web address]. By joining "[employer]’s FollowMyHealth portal, you will also be able to view your health information using other applications (apps) compatible with our system.

## 2024-04-05 NOTE — PHYSICAL THERAPY INITIAL EVALUATION ADULT - NSPTDISCHREC_GEN_A_CORE
home and patient to follow up for outpatient PT script at surgeon's office upon follow up visit. Patient has safely demonstrated the ability to perform ambulation, stairs and car transfers therefore is functionally cleared from Physical Therapy perspective.  Will continue to follow while here in the hospital until discharge./Outpatient PT

## 2024-04-05 NOTE — OCCUPATIONAL THERAPY INITIAL EVALUATION ADULT - NSOTDISCHREC_GEN_A_CORE
Home with outpatient OT for management of left UE weakness once cleared by MD. OT to continue to follow.

## 2024-04-05 NOTE — PHYSICAL THERAPY INITIAL EVALUATION ADULT - PERTINENT HX OF CURRENT PROBLEM, REHAB EVAL
Patient is a Patient is a 63 year old male s/p C7-T1 anterior cervical discectomy with fusion 4/4, POD #1.

## 2024-04-06 LAB
ANION GAP SERPL CALC-SCNC: 11 MMOL/L — SIGNIFICANT CHANGE UP (ref 7–14)
BASOPHILS # BLD AUTO: 0.02 K/UL — SIGNIFICANT CHANGE UP (ref 0–0.2)
BASOPHILS NFR BLD AUTO: 0.3 % — SIGNIFICANT CHANGE UP (ref 0–2)
BUN SERPL-MCNC: 17 MG/DL — SIGNIFICANT CHANGE UP (ref 7–23)
CALCIUM SERPL-MCNC: 9.5 MG/DL — SIGNIFICANT CHANGE UP (ref 8.4–10.5)
CHLORIDE SERPL-SCNC: 104 MMOL/L — SIGNIFICANT CHANGE UP (ref 98–107)
CO2 SERPL-SCNC: 23 MMOL/L — SIGNIFICANT CHANGE UP (ref 22–31)
CREAT SERPL-MCNC: 1.17 MG/DL — SIGNIFICANT CHANGE UP (ref 0.5–1.3)
EGFR: 71 ML/MIN/1.73M2 — SIGNIFICANT CHANGE UP
EOSINOPHIL # BLD AUTO: 0.21 K/UL — SIGNIFICANT CHANGE UP (ref 0–0.5)
EOSINOPHIL NFR BLD AUTO: 2.8 % — SIGNIFICANT CHANGE UP (ref 0–6)
GLUCOSE BLDC GLUCOMTR-MCNC: 121 MG/DL — HIGH (ref 70–99)
GLUCOSE BLDC GLUCOMTR-MCNC: 126 MG/DL — HIGH (ref 70–99)
GLUCOSE BLDC GLUCOMTR-MCNC: 145 MG/DL — HIGH (ref 70–99)
GLUCOSE BLDC GLUCOMTR-MCNC: 241 MG/DL — HIGH (ref 70–99)
GLUCOSE SERPL-MCNC: 130 MG/DL — HIGH (ref 70–99)
HCT VFR BLD CALC: 36.2 % — LOW (ref 39–50)
HGB BLD-MCNC: 12.7 G/DL — LOW (ref 13–17)
IANC: 5.09 K/UL — SIGNIFICANT CHANGE UP (ref 1.8–7.4)
IMM GRANULOCYTES NFR BLD AUTO: 0.4 % — SIGNIFICANT CHANGE UP (ref 0–0.9)
LYMPHOCYTES # BLD AUTO: 1.44 K/UL — SIGNIFICANT CHANGE UP (ref 1–3.3)
LYMPHOCYTES # BLD AUTO: 19.3 % — SIGNIFICANT CHANGE UP (ref 13–44)
MCHC RBC-ENTMCNC: 30 PG — SIGNIFICANT CHANGE UP (ref 27–34)
MCHC RBC-ENTMCNC: 35.1 GM/DL — SIGNIFICANT CHANGE UP (ref 32–36)
MCV RBC AUTO: 85.6 FL — SIGNIFICANT CHANGE UP (ref 80–100)
MONOCYTES # BLD AUTO: 0.68 K/UL — SIGNIFICANT CHANGE UP (ref 0–0.9)
MONOCYTES NFR BLD AUTO: 9.1 % — SIGNIFICANT CHANGE UP (ref 2–14)
NEUTROPHILS # BLD AUTO: 5.09 K/UL — SIGNIFICANT CHANGE UP (ref 1.8–7.4)
NEUTROPHILS NFR BLD AUTO: 68.1 % — SIGNIFICANT CHANGE UP (ref 43–77)
NRBC # BLD: 0 /100 WBCS — SIGNIFICANT CHANGE UP (ref 0–0)
NRBC # FLD: 0 K/UL — SIGNIFICANT CHANGE UP (ref 0–0)
PLATELET # BLD AUTO: 213 K/UL — SIGNIFICANT CHANGE UP (ref 150–400)
POTASSIUM SERPL-MCNC: 4.2 MMOL/L — SIGNIFICANT CHANGE UP (ref 3.5–5.3)
POTASSIUM SERPL-SCNC: 4.2 MMOL/L — SIGNIFICANT CHANGE UP (ref 3.5–5.3)
RBC # BLD: 4.23 M/UL — SIGNIFICANT CHANGE UP (ref 4.2–5.8)
RBC # FLD: 13.1 % — SIGNIFICANT CHANGE UP (ref 10.3–14.5)
SODIUM SERPL-SCNC: 138 MMOL/L — SIGNIFICANT CHANGE UP (ref 135–145)
WBC # BLD: 7.47 K/UL — SIGNIFICANT CHANGE UP (ref 3.8–10.5)
WBC # FLD AUTO: 7.47 K/UL — SIGNIFICANT CHANGE UP (ref 3.8–10.5)

## 2024-04-06 PROCEDURE — 99232 SBSQ HOSP IP/OBS MODERATE 35: CPT

## 2024-04-06 RX ORDER — DEXAMETHASONE 0.5 MG/5ML
8 ELIXIR ORAL EVERY 8 HOURS
Refills: 0 | Status: COMPLETED | OUTPATIENT
Start: 2024-04-06 | End: 2024-04-07

## 2024-04-06 RX ADMIN — Medication 101.6 MILLIGRAM(S): at 21:26

## 2024-04-06 RX ADMIN — Medication 975 MILLIGRAM(S): at 21:27

## 2024-04-06 RX ADMIN — TRAMADOL HYDROCHLORIDE 50 MILLIGRAM(S): 50 TABLET ORAL at 23:43

## 2024-04-06 RX ADMIN — Medication 2: at 16:45

## 2024-04-06 RX ADMIN — AMLODIPINE BESYLATE 2.5 MILLIGRAM(S): 2.5 TABLET ORAL at 05:12

## 2024-04-06 RX ADMIN — SENNA PLUS 2 TABLET(S): 8.6 TABLET ORAL at 21:28

## 2024-04-06 RX ADMIN — TRAMADOL HYDROCHLORIDE 50 MILLIGRAM(S): 50 TABLET ORAL at 00:56

## 2024-04-06 RX ADMIN — CYCLOBENZAPRINE HYDROCHLORIDE 10 MILLIGRAM(S): 10 TABLET, FILM COATED ORAL at 13:26

## 2024-04-06 RX ADMIN — TRAMADOL HYDROCHLORIDE 50 MILLIGRAM(S): 50 TABLET ORAL at 17:23

## 2024-04-06 RX ADMIN — TRAMADOL HYDROCHLORIDE 50 MILLIGRAM(S): 50 TABLET ORAL at 06:11

## 2024-04-06 RX ADMIN — CYCLOBENZAPRINE HYDROCHLORIDE 10 MILLIGRAM(S): 10 TABLET, FILM COATED ORAL at 07:05

## 2024-04-06 RX ADMIN — Medication 975 MILLIGRAM(S): at 22:27

## 2024-04-06 RX ADMIN — TRAMADOL HYDROCHLORIDE 50 MILLIGRAM(S): 50 TABLET ORAL at 11:32

## 2024-04-06 RX ADMIN — TRAMADOL HYDROCHLORIDE 50 MILLIGRAM(S): 50 TABLET ORAL at 05:11

## 2024-04-06 RX ADMIN — TRAMADOL HYDROCHLORIDE 50 MILLIGRAM(S): 50 TABLET ORAL at 18:23

## 2024-04-06 RX ADMIN — Medication 975 MILLIGRAM(S): at 13:26

## 2024-04-06 RX ADMIN — TRAMADOL HYDROCHLORIDE 50 MILLIGRAM(S): 50 TABLET ORAL at 12:32

## 2024-04-06 RX ADMIN — Medication 975 MILLIGRAM(S): at 14:26

## 2024-04-06 RX ADMIN — TAMSULOSIN HYDROCHLORIDE 0.4 MILLIGRAM(S): 0.4 CAPSULE ORAL at 21:28

## 2024-04-06 RX ADMIN — CYCLOBENZAPRINE HYDROCHLORIDE 10 MILLIGRAM(S): 10 TABLET, FILM COATED ORAL at 21:29

## 2024-04-06 RX ADMIN — PANTOPRAZOLE SODIUM 40 MILLIGRAM(S): 20 TABLET, DELAYED RELEASE ORAL at 07:05

## 2024-04-07 LAB
ANION GAP SERPL CALC-SCNC: 17 MMOL/L — HIGH (ref 7–14)
BUN SERPL-MCNC: 17 MG/DL — SIGNIFICANT CHANGE UP (ref 7–23)
CALCIUM SERPL-MCNC: 9.5 MG/DL — SIGNIFICANT CHANGE UP (ref 8.4–10.5)
CHLORIDE SERPL-SCNC: 100 MMOL/L — SIGNIFICANT CHANGE UP (ref 98–107)
CO2 SERPL-SCNC: 18 MMOL/L — LOW (ref 22–31)
CREAT SERPL-MCNC: 1.06 MG/DL — SIGNIFICANT CHANGE UP (ref 0.5–1.3)
EGFR: 80 ML/MIN/1.73M2 — SIGNIFICANT CHANGE UP
GLUCOSE BLDC GLUCOMTR-MCNC: 205 MG/DL — HIGH (ref 70–99)
GLUCOSE BLDC GLUCOMTR-MCNC: 212 MG/DL — HIGH (ref 70–99)
GLUCOSE BLDC GLUCOMTR-MCNC: 234 MG/DL — HIGH (ref 70–99)
GLUCOSE BLDC GLUCOMTR-MCNC: 291 MG/DL — HIGH (ref 70–99)
GLUCOSE SERPL-MCNC: 205 MG/DL — HIGH (ref 70–99)
HCT VFR BLD CALC: 38.8 % — LOW (ref 39–50)
HGB BLD-MCNC: 12.9 G/DL — LOW (ref 13–17)
MCHC RBC-ENTMCNC: 28.5 PG — SIGNIFICANT CHANGE UP (ref 27–34)
MCHC RBC-ENTMCNC: 33.2 GM/DL — SIGNIFICANT CHANGE UP (ref 32–36)
MCV RBC AUTO: 85.7 FL — SIGNIFICANT CHANGE UP (ref 80–100)
NRBC # BLD: 0 /100 WBCS — SIGNIFICANT CHANGE UP (ref 0–0)
NRBC # FLD: 0 K/UL — SIGNIFICANT CHANGE UP (ref 0–0)
PLATELET # BLD AUTO: 211 K/UL — SIGNIFICANT CHANGE UP (ref 150–400)
POTASSIUM SERPL-MCNC: 4.6 MMOL/L — SIGNIFICANT CHANGE UP (ref 3.5–5.3)
POTASSIUM SERPL-SCNC: 4.6 MMOL/L — SIGNIFICANT CHANGE UP (ref 3.5–5.3)
RBC # BLD: 4.53 M/UL — SIGNIFICANT CHANGE UP (ref 4.2–5.8)
RBC # FLD: 13.2 % — SIGNIFICANT CHANGE UP (ref 10.3–14.5)
SODIUM SERPL-SCNC: 135 MMOL/L — SIGNIFICANT CHANGE UP (ref 135–145)
WBC # BLD: 7.85 K/UL — SIGNIFICANT CHANGE UP (ref 3.8–10.5)
WBC # FLD AUTO: 7.85 K/UL — SIGNIFICANT CHANGE UP (ref 3.8–10.5)

## 2024-04-07 PROCEDURE — 99232 SBSQ HOSP IP/OBS MODERATE 35: CPT

## 2024-04-07 RX ADMIN — TRAMADOL HYDROCHLORIDE 50 MILLIGRAM(S): 50 TABLET ORAL at 12:27

## 2024-04-07 RX ADMIN — TRAMADOL HYDROCHLORIDE 50 MILLIGRAM(S): 50 TABLET ORAL at 18:22

## 2024-04-07 RX ADMIN — TRAMADOL HYDROCHLORIDE 50 MILLIGRAM(S): 50 TABLET ORAL at 06:13

## 2024-04-07 RX ADMIN — CYCLOBENZAPRINE HYDROCHLORIDE 10 MILLIGRAM(S): 10 TABLET, FILM COATED ORAL at 14:22

## 2024-04-07 RX ADMIN — Medication 3: at 17:14

## 2024-04-07 RX ADMIN — POLYETHYLENE GLYCOL 3350 17 GRAM(S): 17 POWDER, FOR SOLUTION ORAL at 11:40

## 2024-04-07 RX ADMIN — Medication 101.6 MILLIGRAM(S): at 15:15

## 2024-04-07 RX ADMIN — TRAMADOL HYDROCHLORIDE 50 MILLIGRAM(S): 50 TABLET ORAL at 17:15

## 2024-04-07 RX ADMIN — CYCLOBENZAPRINE HYDROCHLORIDE 10 MILLIGRAM(S): 10 TABLET, FILM COATED ORAL at 21:09

## 2024-04-07 RX ADMIN — TAMSULOSIN HYDROCHLORIDE 0.4 MILLIGRAM(S): 0.4 CAPSULE ORAL at 21:10

## 2024-04-07 RX ADMIN — Medication 975 MILLIGRAM(S): at 21:09

## 2024-04-07 RX ADMIN — TRAMADOL HYDROCHLORIDE 50 MILLIGRAM(S): 50 TABLET ORAL at 23:02

## 2024-04-07 RX ADMIN — CYCLOBENZAPRINE HYDROCHLORIDE 10 MILLIGRAM(S): 10 TABLET, FILM COATED ORAL at 06:51

## 2024-04-07 RX ADMIN — Medication 2: at 07:30

## 2024-04-07 RX ADMIN — TRAMADOL HYDROCHLORIDE 50 MILLIGRAM(S): 50 TABLET ORAL at 05:13

## 2024-04-07 RX ADMIN — TRAMADOL HYDROCHLORIDE 50 MILLIGRAM(S): 50 TABLET ORAL at 11:48

## 2024-04-07 RX ADMIN — Medication 975 MILLIGRAM(S): at 14:22

## 2024-04-07 RX ADMIN — TRAMADOL HYDROCHLORIDE 50 MILLIGRAM(S): 50 TABLET ORAL at 00:43

## 2024-04-07 RX ADMIN — Medication 2: at 11:39

## 2024-04-07 RX ADMIN — Medication 101.6 MILLIGRAM(S): at 05:13

## 2024-04-07 RX ADMIN — AMLODIPINE BESYLATE 2.5 MILLIGRAM(S): 2.5 TABLET ORAL at 05:14

## 2024-04-07 RX ADMIN — PANTOPRAZOLE SODIUM 40 MILLIGRAM(S): 20 TABLET, DELAYED RELEASE ORAL at 07:30

## 2024-04-07 RX ADMIN — Medication 975 MILLIGRAM(S): at 15:22

## 2024-04-07 RX ADMIN — Medication 975 MILLIGRAM(S): at 22:09

## 2024-04-07 NOTE — PROGRESS NOTE ADULT - PROBLEM SELECTOR PLAN 4
- FS QID, AISS  - Goal -180  - A1C of 7.6, borderline controlled DM  - FS's at goal, however anticipate FS's will increase as on steroids for 24 hours    Discussed with Wife and daughter at bedside  D/w Primary Team.
- FS QID, AISS  - Goal -180  - A1C of 7.6, borderline controlled DM  - FS's slightly elevated today, likely steroid related  - Monitor FS's closely, if they remain elevated will start basal lantus    Discussed with Wife at bedside.

## 2024-04-07 NOTE — PROGRESS NOTE ADULT - PROBLEM SELECTOR PLAN 2
c/b post-op urinary retention  - continue flomax  - Passed TOV  - Encourage ambulation  - Ensure pt has regular BM's
c/b post-op urinary retention  - continue flomax  - Velasco is in place, TOV as able  - Encourage ambulation  - Ensure pt has regular BM's

## 2024-04-07 NOTE — PROGRESS NOTE ADULT - PROBLEM SELECTOR PLAN 1
- s/p C7-T1 disectomy/fusion on 4/4  - Pain control with ultram, OxyIR PRN, Flexeril  - Dexamethasone added 4/6 for 24Hrs  - Bowel regimen - Senna/Miralax  - Encouraged IS use  - Surgical site management as per ortho  - PT/OT evals --> Outpatient PT/OT  - VTE ppx - IPC
- s/p C7-T1 disectomy/fusion on 4/4  - Pain control with ultram, OxyIR PRN, Flexeril  - Dexamethasone added today for 24Hrs  - Bowel regimen - Senna/Miralax  - Surgical site management as per ortho  - PT/OT evals --> Outpatient PT/OT  - VTE ppx - IPC

## 2024-04-07 NOTE — PROGRESS NOTE ADULT - ASSESSMENT
63M s/p C7-T1 ACDF 4/4. Recovering appropriately    PLAN  - PT  - WBAT  - DVT prophylaxis- venodynes  - Pain control  - Incentive spirometer  - Decadron x24h  - Dispo planning to home
63M s/p C7-T1 ACDF 4/4. Recovering appropriately    PLAN  -PT  -WBAT  -DVT prophylaxis- venodynes  -Pain control  -Incentive spirometer  -Follow up AM labs/ TISH output 
63M HTN, DM2, BPH, p/w spinal stenosis s/p C7-T1 disectomy/fusion on 4/4 c/b urinary retention. 
63M HTN, DM2, BPH, p/w spinal stenosis s/p C7-T1 disectomy/fusion on 4/4 c/b urinary retention.

## 2024-04-08 ENCOUNTER — TRANSCRIPTION ENCOUNTER (OUTPATIENT)
Age: 64
End: 2024-04-08

## 2024-04-08 VITALS
OXYGEN SATURATION: 100 % | TEMPERATURE: 98 F | RESPIRATION RATE: 18 BRPM | HEART RATE: 97 BPM | DIASTOLIC BLOOD PRESSURE: 77 MMHG | SYSTOLIC BLOOD PRESSURE: 130 MMHG

## 2024-04-08 LAB
ANION GAP SERPL CALC-SCNC: 13 MMOL/L — SIGNIFICANT CHANGE UP (ref 7–14)
BUN SERPL-MCNC: 18 MG/DL — SIGNIFICANT CHANGE UP (ref 7–23)
CALCIUM SERPL-MCNC: 9.4 MG/DL — SIGNIFICANT CHANGE UP (ref 8.4–10.5)
CHLORIDE SERPL-SCNC: 100 MMOL/L — SIGNIFICANT CHANGE UP (ref 98–107)
CO2 SERPL-SCNC: 22 MMOL/L — SIGNIFICANT CHANGE UP (ref 22–31)
CREAT SERPL-MCNC: 1.05 MG/DL — SIGNIFICANT CHANGE UP (ref 0.5–1.3)
EGFR: 81 ML/MIN/1.73M2 — SIGNIFICANT CHANGE UP
GLUCOSE BLDC GLUCOMTR-MCNC: 120 MG/DL — HIGH (ref 70–99)
GLUCOSE BLDC GLUCOMTR-MCNC: 166 MG/DL — HIGH (ref 70–99)
GLUCOSE BLDC GLUCOMTR-MCNC: 167 MG/DL — HIGH (ref 70–99)
GLUCOSE SERPL-MCNC: 173 MG/DL — HIGH (ref 70–99)
HCT VFR BLD CALC: 37.4 % — LOW (ref 39–50)
HGB BLD-MCNC: 12.9 G/DL — LOW (ref 13–17)
MCHC RBC-ENTMCNC: 29.3 PG — SIGNIFICANT CHANGE UP (ref 27–34)
MCHC RBC-ENTMCNC: 34.5 GM/DL — SIGNIFICANT CHANGE UP (ref 32–36)
MCV RBC AUTO: 84.8 FL — SIGNIFICANT CHANGE UP (ref 80–100)
NRBC # BLD: 0 /100 WBCS — SIGNIFICANT CHANGE UP (ref 0–0)
NRBC # FLD: 0 K/UL — SIGNIFICANT CHANGE UP (ref 0–0)
PLATELET # BLD AUTO: 238 K/UL — SIGNIFICANT CHANGE UP (ref 150–400)
POTASSIUM SERPL-MCNC: 4.1 MMOL/L — SIGNIFICANT CHANGE UP (ref 3.5–5.3)
POTASSIUM SERPL-SCNC: 4.1 MMOL/L — SIGNIFICANT CHANGE UP (ref 3.5–5.3)
RBC # BLD: 4.41 M/UL — SIGNIFICANT CHANGE UP (ref 4.2–5.8)
RBC # FLD: 12.8 % — SIGNIFICANT CHANGE UP (ref 10.3–14.5)
SODIUM SERPL-SCNC: 135 MMOL/L — SIGNIFICANT CHANGE UP (ref 135–145)
SURGICAL PATHOLOGY STUDY: SIGNIFICANT CHANGE UP
WBC # BLD: 9.52 K/UL — SIGNIFICANT CHANGE UP (ref 3.8–10.5)
WBC # FLD AUTO: 9.52 K/UL — SIGNIFICANT CHANGE UP (ref 3.8–10.5)

## 2024-04-08 RX ORDER — ACETAMINOPHEN 500 MG
3 TABLET ORAL
Qty: 0 | Refills: 0 | DISCHARGE
Start: 2024-04-08

## 2024-04-08 RX ORDER — HYDROMORPHONE HYDROCHLORIDE 2 MG/ML
4 INJECTION INTRAMUSCULAR; INTRAVENOUS; SUBCUTANEOUS
Qty: 0 | Refills: 0 | DISCHARGE
Start: 2024-04-08

## 2024-04-08 RX ORDER — TAMSULOSIN HYDROCHLORIDE 0.4 MG/1
1 CAPSULE ORAL
Qty: 30 | Refills: 0 | DISCHARGE
Start: 2024-04-08 | End: 2024-05-07

## 2024-04-08 RX ORDER — SENNA PLUS 8.6 MG/1
2 TABLET ORAL
Qty: 0 | Refills: 0 | DISCHARGE
Start: 2024-04-08

## 2024-04-08 RX ORDER — CYCLOBENZAPRINE HYDROCHLORIDE 10 MG/1
1 TABLET, FILM COATED ORAL
Qty: 21 | Refills: 0
Start: 2024-04-08 | End: 2024-04-14

## 2024-04-08 RX ORDER — HYDROMORPHONE HYDROCHLORIDE 2 MG/ML
1 INJECTION INTRAMUSCULAR; INTRAVENOUS; SUBCUTANEOUS
Refills: 0 | DISCHARGE

## 2024-04-08 RX ORDER — POLYETHYLENE GLYCOL 3350 17 G/17G
17 POWDER, FOR SOLUTION ORAL
Qty: 0 | Refills: 0 | DISCHARGE
Start: 2024-04-08

## 2024-04-08 RX ORDER — PANTOPRAZOLE SODIUM 20 MG/1
1 TABLET, DELAYED RELEASE ORAL
Qty: 30 | Refills: 0
Start: 2024-04-08 | End: 2024-05-07

## 2024-04-08 RX ORDER — TAMSULOSIN HYDROCHLORIDE 0.4 MG/1
1 CAPSULE ORAL
Qty: 30 | Refills: 0
Start: 2024-04-08 | End: 2024-05-07

## 2024-04-08 RX ADMIN — AMLODIPINE BESYLATE 2.5 MILLIGRAM(S): 2.5 TABLET ORAL at 06:16

## 2024-04-08 RX ADMIN — TRAMADOL HYDROCHLORIDE 50 MILLIGRAM(S): 50 TABLET ORAL at 00:02

## 2024-04-08 RX ADMIN — TRAMADOL HYDROCHLORIDE 50 MILLIGRAM(S): 50 TABLET ORAL at 06:15

## 2024-04-08 RX ADMIN — PANTOPRAZOLE SODIUM 40 MILLIGRAM(S): 20 TABLET, DELAYED RELEASE ORAL at 06:16

## 2024-04-08 RX ADMIN — TRAMADOL HYDROCHLORIDE 50 MILLIGRAM(S): 50 TABLET ORAL at 07:15

## 2024-04-08 RX ADMIN — CYCLOBENZAPRINE HYDROCHLORIDE 10 MILLIGRAM(S): 10 TABLET, FILM COATED ORAL at 06:16

## 2024-04-08 RX ADMIN — Medication 1: at 07:23

## 2024-04-08 NOTE — DISCHARGE NOTE PROVIDER - NSDCFUSCHEDAPPT_GEN_ALL_CORE_FT
[facility]  ORTHOSUR [address] [facility]  Scheduled Appointment: 04/19/2024

## 2024-04-08 NOTE — PROGRESS NOTE ADULT - PROVIDER SPECIALTY LIST ADULT
Anesthesia
Hospitalist
Orthopedics
Anesthesia
Orthopedics
Hospitalist

## 2024-04-08 NOTE — DISCHARGE NOTE PROVIDER - HOSPITAL COURSE
This is a ***yo **Male/Female** with PMH of **** who presents to [facility] for orthopedic surgery. Patient s/p         with  *** on ***. Patient tolerated the procedure well without any intraoperative complications. Patient tolerated physical therapy, weight bearing as tolerated and pain was controlled. Seen by medical attending for continuity of care and management and cleared for safe discharge. As per surgeon, the patient is stable and ready for discharge. DO NOT take any NSAIDS (motrin, advil, ibuprofen, aleve), Aspirin, Anti-inflammatory medications unless instructed by your orthopedic surgeon to continue. Avoid any heavy lifting, bending, squatting, or twisting motions. Keep dressing/incision clean, dry and intact, may remove dressing two days after discharge and leave incision open to air. Any sutures/staples to be removed on post-op day #14 at your office visit. Please follow up with   ****  in 2 weeks, call the office to make an appointment, [phone]. Please follow up with your PMD for continuity of care and management as medications may have changed. This is a 64 yo Male with PMH of BPH and DM who presents to [facility] for orthopedic surgery. Patient s/p C7-T1 ACDF with Dr. Marion on 4/4. Patient tolerated the procedure well without any intraoperative complications. Patient tolerated physical therapy, weight bearing as tolerated and pain was controlled. Seen by medical attending for continuity of care and management and cleared for safe discharge. As per surgeon, the patient is stable and ready for discharge. DO NOT take any NSAIDS (motrin, advil, ibuprofen, aleve), Aspirin, Anti-inflammatory medications unless instructed by your orthopedic surgeon to continue. Avoid any heavy lifting, bending, squatting, or twisting motions. Keep dressing/incision clean, dry and intact, may remove dressing two days after discharge and leave incision open to air. Any sutures/staples to be removed on post-op day #14 at your office visit. Please follow up with Dr. Madera 2 weeks, call the office to make an appointment, [phone]. Please follow up with your PMD for continuity of care and management as medications may have changed. This is a 64 yo Male with PMH of BPH and DM who presents to [facility] for orthopedic surgery. Patient s/p C7-T1 ACDF with Dr. Marion on 4/4. Patient tolerated the procedure well without any intraoperative complications. Patient tolerated physical therapy, weight bearing as tolerated and pain was controlled. Seen by medical attending for continuity of care and management and cleared for safe discharge. As per surgeon, the patient is stable and ready for discharge. DO NOT take any NSAIDS (motrin, advil, ibuprofen, aleve), Aspirin, Anti-inflammatory medications unless instructed by your orthopedic surgeon to continue. Avoid any heavy lifting, bending, squatting, or twisting motions. Keep dressing/incision clean, dry and intact, may remove dressing two days after discharge and leave incision open to air. Any sutures/staples to be removed on post-op day #14 at your office visit. Please follow up with Dr. Marion in 2 weeks, call the office to make an appointment, [phone]. Please follow up with your PMD for continuity of care and management as medications may have changed.

## 2024-04-08 NOTE — PROGRESS NOTE ADULT - SUBJECTIVE AND OBJECTIVE BOX
ORTHO POC - SPINE     Patient resting comfortably withour complaint s/p C7-T1 ACDF  Preop symptoms left upper extremity  strength improved       Vital Signs Last 24 Hrs  T(C): 36.4 (04 Apr 2024 15:00), Max: 36.6 (04 Apr 2024 07:00)  T(F): 97.5 (04 Apr 2024 15:00), Max: 97.9 (04 Apr 2024 07:00)  HR: 98 (04 Apr 2024 16:00) (89 - 102)  BP: 154/98 (04 Apr 2024 16:00) (130/85 - 154/98)  BP(mean): 109 (04 Apr 2024 16:00) (93 - 109)  RR: 20 (04 Apr 2024 16:00) (11 - 20)  SpO2: 97% (04 Apr 2024 16:00) (95% - 100%)    Parameters below as of 04 Apr 2024 15:00  Patient On (Oxygen Delivery Method): room air          Neck:  Dressing clean/dry/intact   TISH: 5    right UE:  Deltoids:  5/5          Biceps: 5/5           Triceps:  5/5          Wrist ext: 5/5            : 5/5   left UE:  Deltoids:  5/5          Biceps: 5/5           Triceps:  5/5          Wrist ext: 4/5            : 5/5      U/O: DTV              A/P: Stable            PT-WBAT           DVT prophylaxis- venodynes           Pain control           Incentive spirometer           Follow up AM labs/ TISH output             
ANESTHESIA POSTOP CHECK    63y Male POSTOP DAY 1     No COMPLAINTS    NO APPARENT ANESTHESIA COMPLICATIONS      
ORTHOPAEDIC PROGRESS NOTE    SUBJECTIVE:  Pt seen and examined at bedside this am.  Doing well.  No acute events overnight.  Pt states pain is well controlled    OBJECTIVE:  Vital Signs Last 24 Hrs  T(C): 37.1 (06 Apr 2024 05:10), Max: 37.1 (06 Apr 2024 05:10)  T(F): 98.8 (06 Apr 2024 05:10), Max: 98.8 (06 Apr 2024 05:10)  HR: 97 (06 Apr 2024 05:10) (92 - 101)  BP: 137/90 (06 Apr 2024 05:10) (113/82 - 140/86)  BP(mean): --  RR: 18 (06 Apr 2024 05:10) (17 - 18)  SpO2: 97% (06 Apr 2024 05:10) (96% - 100%)    Parameters below as of 06 Apr 2024 05:10  Patient On (Oxygen Delivery Method): room air        Physical Exam:  Gen: NAD  Resp: non labored  Neck:  Dressing clean/dry/intact      right UE:  Deltoids:  5/5          Biceps: 5/5           Triceps:  5/5          Wrist ext: 5/5            : 5/5   left UE:  Deltoids:  5/5          Biceps: 5/5           Triceps:  5/5          Wrist ext: 4/5            : 5/5      LABS                        14.3   8.88  )-----------( 266      ( 05 Apr 2024 05:56 )             41.2       04-05    140  |  103  |  13  ----------------------------<  148<H>  4.1   |  22  |  1.23    Ca    9.9      05 Apr 2024 05:56            I&O's Summary    04 Apr 2024 07:01  -  05 Apr 2024 07:00  --------------------------------------------------------  IN: 905 mL / OUT: 1500 mL / NET: -595 mL    05 Apr 2024 07:01  -  06 Apr 2024 06:38  --------------------------------------------------------  IN: 0 mL / OUT: 3507.5 mL / NET: -3507.5 mL        
Pt seen/examined. Doing well. Pain controlled. No acute overnight complaints or events.  Preop symptoms left upper extremity  strength improved. Trouble voiding last night, has a hx of BPH      T(C): 36.4 (04-05-24 @ 05:36), Max: 36.8 (04-04-24 @ 22:16)  HR: 97 (04-05-24 @ 05:36) (89 - 105)  BP: 144/91 (04-05-24 @ 05:36) (130/85 - 154/98)  RR: 18 (04-05-24 @ 05:36) (11 - 20)  SpO2: 100% (04-05-24 @ 05:36) (95% - 100%)  Wt(kg): --  - Gen: NAD    Neck:  Dressing clean/dry/intact   TISH: 5    right UE:  Deltoids:  5/5          Biceps: 5/5           Triceps:  5/5          Wrist ext: 5/5            : 5/5   left UE:  Deltoids:  5/5          Biceps: 5/5           Triceps:  5/5          Wrist ext: 4/5            : 5/5    A/P: 63M s/p C7-T1 ACDF 4/4. Recovering appropriately           f/u bladder scan, UA           PT-WBAT           DVT prophylaxis- venodynes           Pain control           Incentive spirometer           Follow up AM labs/ TISH output 
ANESTHESIA POSTOP CHECK    63y Male POSTOP DAY 1     No COMPLAINTS    NO APPARENT ANESTHESIA COMPLICATIONS      
Orthopedic Surgery Progress Note     S: Patient seen and examined today. Pain is well controlled. Denies f/c, chest pain, shortness of breath, dizziness. Has been OOB/ambulating.  No difficulty w/ eating or swallowing. Wishes to go home today.    ICU Vital Signs Last 24 Hrs  T(C): 36.7 (08 Apr 2024 06:15), Max: 36.8 (07 Apr 2024 09:48)  T(F): 98.1 (08 Apr 2024 06:15), Max: 98.2 (07 Apr 2024 09:48)  HR: 103 (08 Apr 2024 06:15) (90 - 103)  BP: 120/75 (08 Apr 2024 06:15) (120/75 - 130/86)  BP(mean): --  ABP: --  ABP(mean): --  RR: 17 (08 Apr 2024 06:15) (17 - 18)  SpO2: 100% (08 Apr 2024 06:15) (98% - 100%)    O2 Parameters below as of 08 Apr 2024 06:15  Patient On (Oxygen Delivery Method): room air          Physical Exam:  Gen: NAD  Neck:  Dressing clean/dry/intact      right UE:  Deltoids:  5/5          Biceps: 5/5           Triceps:  5/5          Wrist ext: 5/5            : 5/5   left UE:  Deltoids:  5/5          Biceps: 5/5           Triceps:  5/5          Wrist ext: 4/5            : 5/5      63M s/p C7-T1 ACDF 4/4, progressing well.    PLAN  - PT  - WBAT  - DVT prophylaxis- venodynes  - Pain control  - Incentive spirometer  - Dispo planning home, possible today  
  Patient is a 63y old  Male who presents with a chief complaint of Spinal stenosis cervical region (28 Mar 2024 11:39)      INTERVAL HPI/OVERNIGHT EVENTS:  Seen by me this afternoon, doing well today, wife and daughter at bedside, tolerating PO, no flatus, walked earlier today in hallway, using IS. Pain is under control.    Review of Systems: 12 point review of systems otherwise negative    MEDICATIONS  (STANDING):  acetaminophen     Tablet .. 975 milliGRAM(s) Oral every 8 hours  amLODIPine   Tablet 2.5 milliGRAM(s) Oral daily  cyclobenzaprine 10 milliGRAM(s) Oral every 8 hours  dexAMETHasone  IVPB 8 milliGRAM(s) IV Intermittent every 8 hours  dextrose 10% Bolus 125 milliLiter(s) IV Bolus once  dextrose 5%. 1000 milliLiter(s) (50 mL/Hr) IV Continuous <Continuous>  dextrose 5%. 1000 milliLiter(s) (100 mL/Hr) IV Continuous <Continuous>  dextrose 50% Injectable 25 Gram(s) IV Push once  dextrose 50% Injectable 12.5 Gram(s) IV Push once  glucagon  Injectable 1 milliGRAM(s) IntraMuscular once  hydrochlorothiazide 25 milliGRAM(s) Oral daily  insulin lispro (ADMELOG) corrective regimen sliding scale   SubCutaneous three times a day before meals  insulin lispro (ADMELOG) corrective regimen sliding scale   SubCutaneous at bedtime  pantoprazole    Tablet 40 milliGRAM(s) Oral before breakfast  polyethylene glycol 3350 17 Gram(s) Oral daily  senna 2 Tablet(s) Oral at bedtime  tamsulosin 0.4 milliGRAM(s) Oral at bedtime  traMADol 50 milliGRAM(s) Oral every 6 hours    MEDICATIONS  (PRN):  dextrose Oral Gel 15 Gram(s) Oral once PRN Blood Glucose LESS THAN 70 milliGRAM(s)/deciliter  magnesium hydroxide Suspension 30 milliLiter(s) Oral every 12 hours PRN Constipation  ondansetron Injectable 4 milliGRAM(s) IV Push every 6 hours PRN Nausea and/or Vomiting  oxyCODONE    IR 5 milliGRAM(s) Oral every 6 hours PRN Moderate Pain (4 - 6)  oxyCODONE    IR 10 milliGRAM(s) Oral every 4 hours PRN Severe Pain (7 - 10)      Allergies    No Known Allergies    Intolerances          Vital Signs Last 24 Hrs  T(C): 36.3 (2024 17:25), Max: 37.1 (2024 05:10)  T(F): 97.4 (2024 17:25), Max: 98.8 (2024 05:10)  HR: 98 (:25) (94 - 101)  BP: 126/88 (:25) (113/79 - 137/90)  BP(mean): --  RR: 18 (:) (17 - 18)  SpO2: 98% (:25) (96% - 99%)    Parameters below as of :  Patient On (Oxygen Delivery Method): room air      CAPILLARY BLOOD GLUCOSE      POCT Blood Glucose.: 241 mg/dL (2024 16:28)  POCT Blood Glucose.: 126 mg/dL (2024 11:22)  POCT Blood Glucose.: 121 mg/dL (2024 07:28)  POCT Blood Glucose.: 140 mg/dL (2024 22:26)       @ 07:  -   @ 07:00  --------------------------------------------------------  IN: 0 mL / OUT: 3507.5 mL / NET: -3507.5 mL    06 @ 07:  -  06 @ 20:10  --------------------------------------------------------  IN: 0 mL / OUT: 1250 mL / NET: -1250 mL        Physical Exam:    Daily     Daily Weight in k.8 (2024 01:34)  General:  Well appearing, NAD, not cachetic  HEENT:  Nonicteric, PERRLA  CV:  RRR, no murmur, no JVD  Lungs:  CTA B/L, no wheezes, rales, rhonchi  Abdomen:  Soft, non-tender, no distended, positive BS, no hepatosplenomegaly  TISH drain x1 with small amount of bloody output  Extremities:  2+ pulses, no c/c, no edema  Skin:  Warm and dry, no rashes  :  +rey  Neuro:  AAOx3, non-focal, CN II-XII grossly intact  No Restraints    LABS:                        12.7   7.47  )-----------( 213      ( 2024 05:21 )             36.2     04-06    138  |  104  |  17  ----------------------------<  130<H>  4.2   |  23  |  1.17    Ca    9.5      2024 05:21        Urinalysis Basic - ( 2024 05:21 )    Color: x / Appearance: x / SG: x / pH: x  Gluc: 130 mg/dL / Ketone: x  / Bili: x / Urobili: x   Blood: x / Protein: x / Nitrite: x   Leuk Esterase: x / RBC: x / WBC x   Sq Epi: x / Non Sq Epi: x / Bacteria: x          RADIOLOGY & ADDITIONAL TESTS:  Reviewed by me    
Orthopedic Surgery Progress Note     S: Patient seen and examined today. Passed TOV ovn. Pain is well controlled. Denies f/c, chest pain, shortness of breath, dizziness.    MEDICATIONS  (STANDING):  acetaminophen     Tablet .. 975 milliGRAM(s) Oral every 8 hours  amLODIPine   Tablet 2.5 milliGRAM(s) Oral daily  cyclobenzaprine 10 milliGRAM(s) Oral every 8 hours  dexAMETHasone  IVPB 8 milliGRAM(s) IV Intermittent every 8 hours  dextrose 10% Bolus 125 milliLiter(s) IV Bolus once  dextrose 5%. 1000 milliLiter(s) (100 mL/Hr) IV Continuous <Continuous>  dextrose 5%. 1000 milliLiter(s) (50 mL/Hr) IV Continuous <Continuous>  dextrose 50% Injectable 25 Gram(s) IV Push once  dextrose 50% Injectable 12.5 Gram(s) IV Push once  glucagon  Injectable 1 milliGRAM(s) IntraMuscular once  hydrochlorothiazide 25 milliGRAM(s) Oral daily  insulin lispro (ADMELOG) corrective regimen sliding scale   SubCutaneous three times a day before meals  insulin lispro (ADMELOG) corrective regimen sliding scale   SubCutaneous at bedtime  pantoprazole    Tablet 40 milliGRAM(s) Oral before breakfast  polyethylene glycol 3350 17 Gram(s) Oral daily  senna 2 Tablet(s) Oral at bedtime  tamsulosin 0.4 milliGRAM(s) Oral at bedtime  traMADol 50 milliGRAM(s) Oral every 6 hours    MEDICATIONS  (PRN):  dextrose Oral Gel 15 Gram(s) Oral once PRN Blood Glucose LESS THAN 70 milliGRAM(s)/deciliter  magnesium hydroxide Suspension 30 milliLiter(s) Oral every 12 hours PRN Constipation  ondansetron Injectable 4 milliGRAM(s) IV Push every 6 hours PRN Nausea and/or Vomiting  oxyCODONE    IR 10 milliGRAM(s) Oral every 4 hours PRN Severe Pain (7 - 10)  oxyCODONE    IR 5 milliGRAM(s) Oral every 6 hours PRN Moderate Pain (4 - 6)      Vital Signs Last 24 Hrs  T(C): 36.4 (07 Apr 2024 05:10), Max: 36.8 (06 Apr 2024 09:39)  T(F): 97.5 (07 Apr 2024 05:10), Max: 98.2 (06 Apr 2024 09:39)  HR: 102 (07 Apr 2024 05:10) (94 - 102)  BP: 127/87 (07 Apr 2024 05:10) (113/79 - 128/85)  BP(mean): --  RR: 18 (07 Apr 2024 05:10) (17 - 18)  SpO2: 99% (07 Apr 2024 05:10) (97% - 99%)    Parameters below as of 07 Apr 2024 05:10  Patient On (Oxygen Delivery Method): room air        04-06-24 @ 07:01  -  04-07-24 @ 07:00  --------------------------------------------------------  IN: 0 mL / OUT: 2082 mL / NET: -2082 mL        Physical Exam:  Gen: NAD  Neck:  Dressing clean/dry/intact      right UE:  Deltoids:  5/5          Biceps: 5/5           Triceps:  5/5          Wrist ext: 5/5            : 5/5   left UE:  Deltoids:  5/5          Biceps: 5/5           Triceps:  5/5          Wrist ext: 4/5            : 5/5        LABS:                        12.9   7.85  )-----------( 211      ( 07 Apr 2024 05:24 )             38.8     04-07    135  |  100  |  17  ----------------------------<  205<H>  4.6   |  18<L>  |  1.06    Ca    9.5      07 Apr 2024 05:24    
CHIEF COMPLAINT: f/u     SUBJECTIVE / OVERNIGHT EVENTS: Patient seen and examined. No acute events overnight. Pain well controlled and patient without any complaints.  Sitting in chair, wife at bedside. Passed TOV. No BM yet. Tolerating PO. Not using IS.    MEDICATIONS  (STANDING):  acetaminophen     Tablet .. 975 milliGRAM(s) Oral every 8 hours  amLODIPine   Tablet 2.5 milliGRAM(s) Oral daily  cyclobenzaprine 10 milliGRAM(s) Oral every 8 hours  dexAMETHasone  IVPB 8 milliGRAM(s) IV Intermittent every 8 hours  dextrose 10% Bolus 125 milliLiter(s) IV Bolus once  dextrose 5%. 1000 milliLiter(s) (100 mL/Hr) IV Continuous <Continuous>  dextrose 5%. 1000 milliLiter(s) (50 mL/Hr) IV Continuous <Continuous>  dextrose 50% Injectable 25 Gram(s) IV Push once  dextrose 50% Injectable 12.5 Gram(s) IV Push once  glucagon  Injectable 1 milliGRAM(s) IntraMuscular once  hydrochlorothiazide 25 milliGRAM(s) Oral daily  insulin lispro (ADMELOG) corrective regimen sliding scale   SubCutaneous at bedtime  insulin lispro (ADMELOG) corrective regimen sliding scale   SubCutaneous three times a day before meals  pantoprazole    Tablet 40 milliGRAM(s) Oral before breakfast  polyethylene glycol 3350 17 Gram(s) Oral daily  senna 2 Tablet(s) Oral at bedtime  tamsulosin 0.4 milliGRAM(s) Oral at bedtime  traMADol 50 milliGRAM(s) Oral every 6 hours    MEDICATIONS  (PRN):  dextrose Oral Gel 15 Gram(s) Oral once PRN Blood Glucose LESS THAN 70 milliGRAM(s)/deciliter  magnesium hydroxide Suspension 30 milliLiter(s) Oral every 12 hours PRN Constipation  ondansetron Injectable 4 milliGRAM(s) IV Push every 6 hours PRN Nausea and/or Vomiting  oxyCODONE    IR 5 milliGRAM(s) Oral every 6 hours PRN Moderate Pain (4 - 6)  oxyCODONE    IR 10 milliGRAM(s) Oral every 4 hours PRN Severe Pain (7 - 10)      VITALS:  T(F): 97.8 (04-07-24 @ 13:54), Max: 98.2 (04-07-24 @ 09:48)  HR: 96 (04-07-24 @ 13:54) (90 - 102)  BP: 120/80 (04-07-24 @ 13:54) (120/80 - 128/85)  RR: 18 (04-07-24 @ 13:54) (18 - 18)  SpO2: 98% (04-07-24 @ 13:54)  Wt(kg): --      PHYSICAL EXAM:  GENERAL: NAD, well-developed  HEAD:  Atraumatic, Normocephalic  EYES: EOMI, PERRLA, conjunctiva and sclera clear  NECK: Supple, No JVD  CHEST/LUNG: Clear to auscultation bilaterally; No wheeze  HEART: Regular rate and rhythm; No murmurs, rubs, or gallops  ABDOMEN: Soft, Nontender, Nondistended; Bowel sounds present  TISH drain x1 with small amount of bloody output  EXTREMITIES:  2+ Peripheral Pulses, No clubbing, cyanosis, or edema  PSYCH: AAOx3  NEUROLOGY: non-focal  SKIN: No rashes or lesions    LABS:              12.9                 135  | 18   | 17           7.85  >-----------< 211     ------------------------< 205                   38.8                 4.6  | 100  | 1.06                                         Ca 9.5   Mg x     Ph x                  Urinalysis Basic - ( 07 Apr 2024 05:24 )    Color: x / Appearance: x / SG: x / pH: x  Gluc: 205 mg/dL / Ketone: x  / Bili: x / Urobili: x   Blood: x / Protein: x / Nitrite: x   Leuk Esterase: x / RBC: x / WBC x   Sq Epi: x / Non Sq Epi: x / Bacteria: x        RADIOLOGY & ADDITIONAL TESTS:  Imaging Personally Reviewed: [x] Yes    [ ] Consultant(s) Notes Reviewed:  [x] Care Discussed with Consultants/Other Providers: Orthopedic PA - discussed

## 2024-04-08 NOTE — DISCHARGE NOTE PROVIDER - NSDCCPCAREPLAN_GEN_ALL_CORE_FT
PRINCIPAL DISCHARGE DIAGNOSIS  Diagnosis: Spinal stenosis, cervical region  Assessment and Plan of Treatment:

## 2024-04-08 NOTE — DISCHARGE NOTE PROVIDER - CARE PROVIDER_API CALL
Edward Marion)  Orthopaedic Surgery  [address], [address]  [place] [address]  Phone: [phone]  Fax: [phone]  Follow Up Time:

## 2024-04-08 NOTE — DISCHARGE NOTE PROVIDER - NSDCMRMEDTOKEN_GEN_ALL_CORE_FT
amLODIPine 2.5 mg oral tablet: 1 tab(s) orally once a day  hydroCHLOROthiazide 25 mg oral tablet: 1 tab(s) orally once a day  HYDROmorphone 4 mg oral tablet: 1 tab(s) orally 3 times a day as needed  Movantik 25 mg oral tablet: 1 tab(s) orally once a day as needed  Ozempic 8 mg/3 mL (2 mg dose) subcutaneous solution: 2 milligram(s) subcutaneously once a week WEDNESDAY  traMADol 50 mg oral tablet: 1 tab(s) orally 3 times a day as needed   acetaminophen 325 mg oral tablet: 3 tab(s) orally every 8 hours  amLODIPine 2.5 mg oral tablet: 1 tab(s) orally once a day  hydroCHLOROthiazide 25 mg oral tablet: 1 tab(s) orally once a day  Movantik 25 mg oral tablet: 1 tab(s) orally once a day as needed  Ozempic 8 mg/3 mL (2 mg dose) subcutaneous solution: 2 milligram(s) subcutaneously once a week WEDNESDAY  polyethylene glycol 3350 oral powder for reconstitution: 17 gram(s) orally once a day  senna leaf extract oral tablet: 2 tab(s) orally once a day (at bedtime)  traMADol 50 mg oral tablet: 1 tab(s) orally 3 times a day as needed   acetaminophen 325 mg oral tablet: 3 tab(s) orally every 8 hours  amLODIPine 2.5 mg oral tablet: 1 tab(s) orally once a day  cyclobenzaprine 10 mg oral tablet: 1 tab(s) orally every 8 hours  Dilaudid 4 mg oral tablet: 4 milligram(s) orally 3 times a day  hydroCHLOROthiazide 25 mg oral tablet: 1 tab(s) orally once a day  Movantik 25 mg oral tablet: 1 tab(s) orally once a day as needed  Ozempic 8 mg/3 mL (2 mg dose) subcutaneous solution: 2 milligram(s) subcutaneously once a week WEDNESDAY  pantoprazole 40 mg oral delayed release tablet: 1 tab(s) orally once a day (before a meal)  polyethylene glycol 3350 oral powder for reconstitution: 17 gram(s) orally once a day  senna leaf extract oral tablet: 2 tab(s) orally once a day (at bedtime)  tamsulosin 0.4 mg oral capsule: 1 cap(s) orally once a day (at bedtime)  traMADol 50 mg oral tablet: 1 tab(s) orally 3 times a day as needed

## 2024-04-10 ENCOUNTER — APPOINTMENT (OUTPATIENT)
Dept: UROLOGY | Facility: CLINIC | Age: 64
End: 2024-04-10

## 2024-04-11 ENCOUNTER — APPOINTMENT (OUTPATIENT)
Dept: UROLOGY | Facility: CLINIC | Age: 64
End: 2024-04-11
Payer: MEDICAID

## 2024-04-11 VITALS
BODY MASS INDEX: 25.9 KG/M2 | DIASTOLIC BLOOD PRESSURE: 72 MMHG | SYSTOLIC BLOOD PRESSURE: 113 MMHG | HEIGHT: 71 IN | WEIGHT: 185 LBS

## 2024-04-11 VITALS
DIASTOLIC BLOOD PRESSURE: 66 MMHG | SYSTOLIC BLOOD PRESSURE: 94 MMHG | RESPIRATION RATE: 16 BRPM | HEART RATE: 109 BPM | OXYGEN SATURATION: 97 %

## 2024-04-11 VITALS
DIASTOLIC BLOOD PRESSURE: 70 MMHG | HEART RATE: 98 BPM | OXYGEN SATURATION: 98 % | RESPIRATION RATE: 16 BRPM | SYSTOLIC BLOOD PRESSURE: 101 MMHG

## 2024-04-11 PROCEDURE — 51703 INSERT BLADDER CATH COMPLEX: CPT

## 2024-04-18 ENCOUNTER — APPOINTMENT (OUTPATIENT)
Dept: UROLOGY | Facility: CLINIC | Age: 64
End: 2024-04-18
Payer: MEDICAID

## 2024-04-18 DIAGNOSIS — Z87.898 PERSONAL HISTORY OF OTHER SPECIFIED CONDITIONS: ICD-10-CM

## 2024-04-18 DIAGNOSIS — R97.20 ELEVATED PROSTATE, SPECIFIC ANTIGEN [PSA]: ICD-10-CM

## 2024-04-18 PROCEDURE — 99213 OFFICE O/P EST LOW 20 MIN: CPT

## 2024-04-18 PROCEDURE — G2211 COMPLEX E/M VISIT ADD ON: CPT | Mod: NC,1L

## 2024-04-18 RX ORDER — CEPHALEXIN 500 MG/1
500 CAPSULE ORAL
Qty: 10 | Refills: 0 | Status: DISCONTINUED | COMMUNITY
Start: 2023-09-22 | End: 2024-04-18

## 2024-04-18 RX ORDER — TAMSULOSIN HYDROCHLORIDE 0.4 MG/1
0.4 CAPSULE ORAL
Qty: 180 | Refills: 1 | Status: ACTIVE | COMMUNITY
Start: 2024-04-11 | End: 1900-01-01

## 2024-04-18 NOTE — LETTER BODY
[Dear  ___] : Dear ~MALU, [Consult Letter:] : I had the pleasure of evaluating your patient, [unfilled]. [Consult Closing:] : Thank you very much for allowing me to participate in the care of this patient.  If you have any questions, please do not hesitate to contact me. [FreeTextEntry1] : ACP\par  [address] \[place]  [place], [address]  \par  [phone] \par

## 2024-04-18 NOTE — PHYSICAL EXAM
[Urethral Meatus] : meatus normal [Penis Abnormality] : normal circumcised penis [Urinary Bladder Findings] : the bladder was normal on palpation [Scrotum] : the scrotum was normal [Testes Tenderness] : no tenderness of the testes [Testes Mass (___cm)] : there were no testicular masses [Prostate Tenderness] : the prostate was not tender [No Prostate Nodules] : no prostate nodules [Prostate Enlarged] : was enlarged [FreeTextEntry1] : JAZMIN done previously.  Velasco with clear urine [General Appearance - Well Developed] : well developed [General Appearance - Well Nourished] : well nourished [Normal Appearance] : normal appearance [Well Groomed] : well groomed [General Appearance - In No Acute Distress] : no acute distress [] : no respiratory distress [Abdomen Soft] : soft [Oriented To Time, Place, And Person] : oriented to person, place, and time [Affect] : the affect was normal [Mood] : the mood was normal [Not Anxious] : not anxious

## 2024-04-18 NOTE — HISTORY OF PRESENT ILLNESS
[FreeTextEntry1] : He is a 63 year-old man who is seen today in follow-up for urinary retention, large prostate and elevated PSA level.  On April 11, 2024 a Velasco catheter was placed after orthopedic surgery.  He is here today to remove the catheter.  Urine is clear.  Increase tamsulosin to twice a day.  He does not have constipation.  Previously nocturia was 2 or 3 times.  Previous notes: He had gross hematuria previously.  He did not undergo cystoscopy.  CT urogram in September 2023 showed a very large prostate.  Urine culture showed Staphylococcus and he was given Keflex.  Urine cytology was negative.  He is a non smoker. He is not on any blood thinners. Urine is clear now. He has not followed for elevated PSA in a while. PSA was 21 in 2021 and 11 in 2020. There is no weight loss or bone pain. He also has history of ED and previously did not respond very well to Sildenafil or Tadalafil.  Prostate biopsy done for PSA level of 7 was benign in June 2018. MRI in 2021 showed a 185 g prostate and no lesions.   His father received radiation therapy for prostate cancer.  He underwent circumcision in 2019. Patient had not seen a doctor for over 10 years and was first seen in March 2018. Hemoglobin A1c was over 13 and urinalysis showed glucose and white blood cells.

## 2024-04-18 NOTE — ASSESSMENT
[FreeTextEntry1] : Tamsulosin twice a day was refilled.  Velasco catheter was removed for trial of void.  If he is unable to void, he will follow-up later on today.  Otherwise he will follow-up in a few weeks to reassess PSA level.  He is aware of having a very large prostate and elevated PSA level.  Beto Bynum MD, FACS [facility]  of Urology  [address], [address] [place] [address]  Tel: [phone] Fax: [phone]

## 2024-04-19 ENCOUNTER — APPOINTMENT (OUTPATIENT)
Dept: ORTHOPEDIC SURGERY | Facility: CLINIC | Age: 64
End: 2024-04-19
Payer: MEDICAID

## 2024-04-19 ENCOUNTER — NON-APPOINTMENT (OUTPATIENT)
Age: 64
End: 2024-04-19

## 2024-04-19 VITALS
OXYGEN SATURATION: 98 % | SYSTOLIC BLOOD PRESSURE: 143 MMHG | DIASTOLIC BLOOD PRESSURE: 89 MMHG | HEIGHT: 71 IN | HEART RATE: 112 BPM | WEIGHT: 185 LBS | BODY MASS INDEX: 25.9 KG/M2

## 2024-04-19 DIAGNOSIS — M50.20 OTHER CERVICAL DISC DISPLACEMENT, UNSPECIFIED CERVICAL REGION: ICD-10-CM

## 2024-04-19 DIAGNOSIS — M48.02 SPINAL STENOSIS, CERVICAL REGION: ICD-10-CM

## 2024-04-19 DIAGNOSIS — M54.12 RADICULOPATHY, CERVICAL REGION: ICD-10-CM

## 2024-04-19 PROCEDURE — 99024 POSTOP FOLLOW-UP VISIT: CPT

## 2024-04-19 PROCEDURE — 72040 X-RAY EXAM NECK SPINE 2-3 VW: CPT

## 2024-04-25 ENCOUNTER — APPOINTMENT (OUTPATIENT)
Dept: UROLOGY | Facility: CLINIC | Age: 64
End: 2024-04-25
Payer: MEDICAID

## 2024-04-25 DIAGNOSIS — R35.1 BENIGN PROSTATIC HYPERPLASIA WITH LOWER URINARY TRACT SYMPMS: ICD-10-CM

## 2024-04-25 DIAGNOSIS — N40.1 BENIGN PROSTATIC HYPERPLASIA WITH LOWER URINARY TRACT SYMPMS: ICD-10-CM

## 2024-04-25 PROCEDURE — G2211 COMPLEX E/M VISIT ADD ON: CPT | Mod: NC,1L

## 2024-04-25 PROCEDURE — 99213 OFFICE O/P EST LOW 20 MIN: CPT

## 2024-04-25 NOTE — ASSESSMENT
[FreeTextEntry1] : He will continue with tamsulosin twice a day.  He is emptying his bladder well.  Urinalysis and urine culture will be sent.  We will discuss the results on the next step on the phone.  He will follow-up in a few months to repeat PSA level.  Beto Bynum MD, FACS [facility]  of Urology  [address], [address] [place] [address]  Tel: [phone] Fax: [phone]

## 2024-04-25 NOTE — HISTORY OF PRESENT ILLNESS
[FreeTextEntry1] : He is a 63 year-old man who is seen today in follow-up for urinary retention, large prostate and elevated PSA level.  On April 11, 2024 a Velasco catheter was placed after orthopedic surgery.  Velasco catheter has been removed since then.  He is on tamsulosin twice a day now.  Nocturia is 2 or 3 times and he seems to be satisfied with urination.  Residual urine volume today was 80 mL.  He was experiencing slight dysuria and he came in today.   Previous notes: He had gross hematuria previously.  He did not undergo cystoscopy.  CT urogram in September 2023 showed a very large prostate.  Urine culture showed Staphylococcus and he was given Keflex.  Urine cytology was negative.  He is a non smoker. He is not on any blood thinners. Urine is clear now. He has not followed for elevated PSA in a while. PSA was 21 in 2021 and 11 in 2020. There is no weight loss or bone pain. He also has history of ED and previously did not respond very well to Sildenafil or Tadalafil.  Prostate biopsy done for PSA level of 7 was benign in June 2018. MRI in 2021 showed a 185 g prostate and no lesions.   His father received radiation therapy for prostate cancer.  He underwent circumcision in 2019. Patient had not seen a doctor for over 10 years and was first seen in March 2018. Hemoglobin A1c was over 13 and urinalysis showed glucose and white blood cells.

## 2024-04-25 NOTE — LETTER BODY
[Dear  ___] : Dear ~MALU, [Consult Letter:] : I had the pleasure of evaluating your patient, [unfilled]. [Consult Closing:] : Thank you very much for allowing me to participate in the care of this patient.  If you have any questions, please do not hesitate to contact me. [FreeTextEntry1] : ACP\par  [address] \[place]  [place], [place], [address]  \par  [phone] \par

## 2024-04-25 NOTE — PHYSICAL EXAM
[Urethral Meatus] : meatus normal [Penis Abnormality] : normal circumcised penis [Urinary Bladder Findings] : the bladder was normal on palpation [Scrotum] : the scrotum was normal [Testes Tenderness] : no tenderness of the testes [Testes Mass (___cm)] : there were no testicular masses [Prostate Tenderness] : the prostate was not tender [No Prostate Nodules] : no prostate nodules [Prostate Enlarged] : was enlarged [FreeTextEntry1] : Genital exam was done previously [General Appearance - Well Developed] : well developed [General Appearance - Well Nourished] : well nourished [Normal Appearance] : normal appearance [Well Groomed] : well groomed [General Appearance - In No Acute Distress] : no acute distress [] : no respiratory distress [Exaggerated Use Of Accessory Muscles For Inspiration] : no accessory muscle use [Oriented To Time, Place, And Person] : oriented to person, place, and time [Affect] : the affect was normal [Mood] : the mood was normal [Not Anxious] : not anxious

## 2024-04-26 DIAGNOSIS — N39.0 URINARY TRACT INFECTION, SITE NOT SPECIFIED: ICD-10-CM

## 2024-04-26 LAB
APPEARANCE: ABNORMAL
BACTERIA: ABNORMAL /HPF
BILIRUBIN URINE: NEGATIVE
BLOOD URINE: ABNORMAL
CAST: 4 /LPF
COLOR: YELLOW
EPITHELIAL CELLS: 0 /HPF
GLUCOSE QUALITATIVE U: NEGATIVE MG/DL
KETONES URINE: NEGATIVE MG/DL
LEUKOCYTE ESTERASE URINE: ABNORMAL
MICROSCOPIC-UA: NORMAL
NITRITE URINE: NEGATIVE
PH URINE: 6.5
PROTEIN URINE: 30 MG/DL
RED BLOOD CELLS URINE: 6 /HPF
SPECIFIC GRAVITY URINE: 1.01
UROBILINOGEN URINE: 0.2 MG/DL
WHITE BLOOD CELLS URINE: >998 /HPF

## 2024-04-26 RX ORDER — SULFAMETHOXAZOLE AND TRIMETHOPRIM 800; 160 MG/1; MG/1
800-160 TABLET ORAL TWICE DAILY
Qty: 10 | Refills: 0 | Status: ACTIVE | COMMUNITY
Start: 2024-04-26 | End: 1900-01-01

## 2024-04-30 LAB — BACTERIA UR CULT: ABNORMAL

## 2024-05-17 ENCOUNTER — APPOINTMENT (OUTPATIENT)
Dept: ORTHOPEDIC SURGERY | Facility: CLINIC | Age: 64
End: 2024-05-17

## 2024-05-23 ENCOUNTER — NON-APPOINTMENT (OUTPATIENT)
Age: 64
End: 2024-05-23

## 2024-06-21 ENCOUNTER — APPOINTMENT (OUTPATIENT)
Dept: ORTHOPEDIC SURGERY | Facility: CLINIC | Age: 64
End: 2024-06-21

## 2024-07-04 ENCOUNTER — NON-APPOINTMENT (OUTPATIENT)
Age: 64
End: 2024-07-04

## 2024-10-28 ENCOUNTER — APPOINTMENT (OUTPATIENT)
Dept: UROLOGY | Facility: CLINIC | Age: 64
End: 2024-10-28

## 2024-11-06 ENCOUNTER — APPOINTMENT (OUTPATIENT)
Dept: UROLOGY | Facility: CLINIC | Age: 64
End: 2024-11-06

## 2024-11-20 ENCOUNTER — APPOINTMENT (OUTPATIENT)
Dept: UROLOGY | Facility: CLINIC | Age: 64
End: 2024-11-20
Payer: MEDICAID

## 2024-11-20 VITALS — HEART RATE: 117 BPM | SYSTOLIC BLOOD PRESSURE: 108 MMHG | OXYGEN SATURATION: 97 % | DIASTOLIC BLOOD PRESSURE: 74 MMHG

## 2024-11-20 DIAGNOSIS — R97.20 ELEVATED PROSTATE, SPECIFIC ANTIGEN [PSA]: ICD-10-CM

## 2024-11-20 DIAGNOSIS — N40.1 BENIGN PROSTATIC HYPERPLASIA WITH LOWER URINARY TRACT SYMPMS: ICD-10-CM

## 2024-11-20 DIAGNOSIS — R35.1 BENIGN PROSTATIC HYPERPLASIA WITH LOWER URINARY TRACT SYMPMS: ICD-10-CM

## 2024-11-20 PROCEDURE — G2211 COMPLEX E/M VISIT ADD ON: CPT | Mod: NC

## 2024-11-20 PROCEDURE — 99214 OFFICE O/P EST MOD 30 MIN: CPT

## 2024-11-20 RX ORDER — DUTASTERIDE 0.5 MG/1
0.5 CAPSULE, LIQUID FILLED ORAL
Qty: 90 | Refills: 3 | Status: ACTIVE | COMMUNITY
Start: 2024-11-20 | End: 1900-01-01

## 2024-11-21 ENCOUNTER — NON-APPOINTMENT (OUTPATIENT)
Age: 64
End: 2024-11-21

## 2025-03-03 ENCOUNTER — INPATIENT (INPATIENT)
Facility: HOSPITAL | Age: 65
LOS: 2 days | Discharge: HOME | End: 2025-03-06
Attending: HOSPITALIST | Admitting: HOSPITALIST
Payer: MEDICAID

## 2025-03-03 VITALS
RESPIRATION RATE: 18 BRPM | TEMPERATURE: 98 F | WEIGHT: 175.05 LBS | HEART RATE: 98 BPM | DIASTOLIC BLOOD PRESSURE: 100 MMHG | SYSTOLIC BLOOD PRESSURE: 154 MMHG | OXYGEN SATURATION: 98 %

## 2025-03-03 DIAGNOSIS — N40.0 BENIGN PROSTATIC HYPERPLASIA WITHOUT LOWER URINARY TRACT SYMPTOMS: Chronic | ICD-10-CM

## 2025-03-03 DIAGNOSIS — Z87.19 PERSONAL HISTORY OF OTHER DISEASES OF THE DIGESTIVE SYSTEM: Chronic | ICD-10-CM

## 2025-03-03 DIAGNOSIS — E11.9 TYPE 2 DIABETES MELLITUS WITHOUT COMPLICATIONS: ICD-10-CM

## 2025-03-03 DIAGNOSIS — R55 SYNCOPE AND COLLAPSE: ICD-10-CM

## 2025-03-03 DIAGNOSIS — Z98.890 OTHER SPECIFIED POSTPROCEDURAL STATES: Chronic | ICD-10-CM

## 2025-03-03 DIAGNOSIS — I10 ESSENTIAL (PRIMARY) HYPERTENSION: ICD-10-CM

## 2025-03-03 DIAGNOSIS — R94.39 ABNORMAL RESULT OF OTHER CARDIOVASCULAR FUNCTION STUDY: ICD-10-CM

## 2025-03-03 DIAGNOSIS — N40.0 BENIGN PROSTATIC HYPERPLASIA WITHOUT LOWER URINARY TRACT SYMPTOMS: ICD-10-CM

## 2025-03-03 DIAGNOSIS — E78.5 HYPERLIPIDEMIA, UNSPECIFIED: ICD-10-CM

## 2025-03-03 LAB
ALBUMIN SERPL ELPH-MCNC: 3.8 G/DL — SIGNIFICANT CHANGE UP (ref 3.3–5)
ALP SERPL-CCNC: 85 U/L — SIGNIFICANT CHANGE UP (ref 40–120)
ALT FLD-CCNC: 17 U/L — SIGNIFICANT CHANGE UP (ref 4–41)
ANION GAP SERPL CALC-SCNC: 11 MMOL/L — SIGNIFICANT CHANGE UP (ref 7–14)
APTT BLD: 30.2 SEC — SIGNIFICANT CHANGE UP (ref 24.5–35.6)
AST SERPL-CCNC: 15 U/L — SIGNIFICANT CHANGE UP (ref 4–40)
BASOPHILS # BLD AUTO: 0.03 K/UL — SIGNIFICANT CHANGE UP (ref 0–0.2)
BASOPHILS NFR BLD AUTO: 0.7 % — SIGNIFICANT CHANGE UP (ref 0–2)
BILIRUB SERPL-MCNC: <0.2 MG/DL — SIGNIFICANT CHANGE UP (ref 0.2–1.2)
BLD GP AB SCN SERPL QL: NEGATIVE — SIGNIFICANT CHANGE UP
BUN SERPL-MCNC: 18 MG/DL — SIGNIFICANT CHANGE UP (ref 7–23)
CALCIUM SERPL-MCNC: 9.4 MG/DL — SIGNIFICANT CHANGE UP (ref 8.4–10.5)
CHLORIDE SERPL-SCNC: 102 MMOL/L — SIGNIFICANT CHANGE UP (ref 98–107)
CO2 SERPL-SCNC: 23 MMOL/L — SIGNIFICANT CHANGE UP (ref 22–31)
CREAT SERPL-MCNC: 1.13 MG/DL — SIGNIFICANT CHANGE UP (ref 0.5–1.3)
EGFR: 73 ML/MIN/1.73M2 — SIGNIFICANT CHANGE UP
EGFR: 73 ML/MIN/1.73M2 — SIGNIFICANT CHANGE UP
EOSINOPHIL # BLD AUTO: 0.15 K/UL — SIGNIFICANT CHANGE UP (ref 0–0.5)
EOSINOPHIL NFR BLD AUTO: 3.6 % — SIGNIFICANT CHANGE UP (ref 0–6)
GLUCOSE BLDC GLUCOMTR-MCNC: 83 MG/DL — SIGNIFICANT CHANGE UP (ref 70–99)
GLUCOSE SERPL-MCNC: 103 MG/DL — HIGH (ref 70–99)
HCT VFR BLD CALC: 40.4 % — SIGNIFICANT CHANGE UP (ref 39–50)
HGB BLD-MCNC: 13.6 G/DL — SIGNIFICANT CHANGE UP (ref 13–17)
IANC: 2.5 K/UL — SIGNIFICANT CHANGE UP (ref 1.8–7.4)
IMM GRANULOCYTES NFR BLD AUTO: 0.2 % — SIGNIFICANT CHANGE UP (ref 0–0.9)
INR BLD: 1.09 RATIO — SIGNIFICANT CHANGE UP (ref 0.85–1.16)
LIDOCAIN IGE QN: 33 U/L — SIGNIFICANT CHANGE UP (ref 7–60)
LYMPHOCYTES # BLD AUTO: 1.06 K/UL — SIGNIFICANT CHANGE UP (ref 1–3.3)
LYMPHOCYTES # BLD AUTO: 25.8 % — SIGNIFICANT CHANGE UP (ref 13–44)
MAGNESIUM SERPL-MCNC: 1.8 MG/DL — SIGNIFICANT CHANGE UP (ref 1.6–2.6)
MCHC RBC-ENTMCNC: 29.1 PG — SIGNIFICANT CHANGE UP (ref 27–34)
MCHC RBC-ENTMCNC: 33.7 G/DL — SIGNIFICANT CHANGE UP (ref 32–36)
MCV RBC AUTO: 86.5 FL — SIGNIFICANT CHANGE UP (ref 80–100)
MONOCYTES # BLD AUTO: 0.36 K/UL — SIGNIFICANT CHANGE UP (ref 0–0.9)
MONOCYTES NFR BLD AUTO: 8.8 % — SIGNIFICANT CHANGE UP (ref 2–14)
NEUTROPHILS # BLD AUTO: 2.5 K/UL — SIGNIFICANT CHANGE UP (ref 1.8–7.4)
NEUTROPHILS NFR BLD AUTO: 60.9 % — SIGNIFICANT CHANGE UP (ref 43–77)
NRBC # BLD AUTO: 0 K/UL — SIGNIFICANT CHANGE UP (ref 0–0)
NRBC # FLD: 0 K/UL — SIGNIFICANT CHANGE UP (ref 0–0)
NRBC BLD AUTO-RTO: 0 /100 WBCS — SIGNIFICANT CHANGE UP (ref 0–0)
NT-PROBNP SERPL-SCNC: 386 PG/ML — HIGH
PLATELET # BLD AUTO: 230 K/UL — SIGNIFICANT CHANGE UP (ref 150–400)
POTASSIUM SERPL-MCNC: 4.4 MMOL/L — SIGNIFICANT CHANGE UP (ref 3.5–5.3)
POTASSIUM SERPL-SCNC: 4.4 MMOL/L — SIGNIFICANT CHANGE UP (ref 3.5–5.3)
PROT SERPL-MCNC: 7.1 G/DL — SIGNIFICANT CHANGE UP (ref 6–8.3)
PROTHROM AB SERPL-ACNC: 12.6 SEC — SIGNIFICANT CHANGE UP (ref 9.9–13.4)
RBC # BLD: 4.67 M/UL — SIGNIFICANT CHANGE UP (ref 4.2–5.8)
RBC # FLD: 13.2 % — SIGNIFICANT CHANGE UP (ref 10.3–14.5)
RH IG SCN BLD-IMP: POSITIVE — SIGNIFICANT CHANGE UP
SODIUM SERPL-SCNC: 136 MMOL/L — SIGNIFICANT CHANGE UP (ref 135–145)
TROPONIN T, HIGH SENSITIVITY RESULT: 35 NG/L — SIGNIFICANT CHANGE UP
WBC # BLD: 4.11 K/UL — SIGNIFICANT CHANGE UP (ref 3.8–10.5)
WBC # FLD AUTO: 4.11 K/UL — SIGNIFICANT CHANGE UP (ref 3.8–10.5)

## 2025-03-03 PROCEDURE — 93010 ELECTROCARDIOGRAM REPORT: CPT | Mod: 77

## 2025-03-03 PROCEDURE — 99285 EMERGENCY DEPT VISIT HI MDM: CPT

## 2025-03-03 PROCEDURE — 71045 X-RAY EXAM CHEST 1 VIEW: CPT | Mod: 26

## 2025-03-03 RX ORDER — TAMSULOSIN HYDROCHLORIDE 0.4 MG/1
0.4 CAPSULE ORAL AT BEDTIME
Refills: 0 | Status: DISCONTINUED | OUTPATIENT
Start: 2025-03-03 | End: 2025-03-05

## 2025-03-03 RX ORDER — ROSUVASTATIN CALCIUM 20 MG/1
40 TABLET, FILM COATED ORAL AT BEDTIME
Refills: 0 | Status: DISCONTINUED | OUTPATIENT
Start: 2025-03-03 | End: 2025-03-06

## 2025-03-03 RX ORDER — TICAGRELOR 90 MG/1
1 TABLET ORAL
Qty: 180 | Refills: 3
Start: 2025-03-03 | End: 2026-02-25

## 2025-03-03 RX ORDER — INFLUENZA A VIRUS A/IDAHO/07/2018 (H1N1) ANTIGEN (MDCK CELL DERIVED, PROPIOLACTONE INACTIVATED, INFLUENZA A VIRUS A/INDIANA/08/2018 (H3N2) ANTIGEN (MDCK CELL DERIVED, PROPIOLACTONE INACTIVATED), INFLUENZA B VIRUS B/SINGAPORE/INFTT-16-0610/2016 ANTIGEN (MDCK CELL DERIVED, PROPIOLACTONE INACTIVATED), INFLUENZA B VIRUS B/IOWA/06/2017 ANTIGEN (MDCK CELL DERIVED, PROPIOLACTONE INACTIVATED) 15; 15; 15; 15 UG/.5ML; UG/.5ML; UG/.5ML; UG/.5ML
0.5 INJECTION, SUSPENSION INTRAMUSCULAR ONCE
Refills: 0 | Status: DISCONTINUED | OUTPATIENT
Start: 2025-03-03 | End: 2025-03-06

## 2025-03-03 RX ORDER — ASPIRIN 325 MG
81 TABLET ORAL DAILY
Refills: 0 | Status: DISCONTINUED | OUTPATIENT
Start: 2025-03-03 | End: 2025-03-06

## 2025-03-03 RX ORDER — TICAGRELOR 90 MG/1
90 TABLET ORAL EVERY 12 HOURS
Refills: 0 | Status: DISCONTINUED | OUTPATIENT
Start: 2025-03-04 | End: 2025-03-06

## 2025-03-03 RX ORDER — AMLODIPINE BESYLATE 10 MG/1
5 TABLET ORAL DAILY
Refills: 0 | Status: DISCONTINUED | OUTPATIENT
Start: 2025-03-03 | End: 2025-03-04

## 2025-03-03 RX ADMIN — Medication 1000 MILLILITER(S): at 15:18

## 2025-03-03 RX ADMIN — Medication 75 MILLILITER(S): at 15:18

## 2025-03-03 RX ADMIN — TAMSULOSIN HYDROCHLORIDE 0.4 MILLIGRAM(S): 0.4 CAPSULE ORAL at 21:32

## 2025-03-03 RX ADMIN — ROSUVASTATIN CALCIUM 40 MILLIGRAM(S): 20 TABLET, FILM COATED ORAL at 21:33

## 2025-03-03 NOTE — ED ADULT NURSE REASSESSMENT NOTE - NS ED NURSE REASSESS COMMENT FT1
report given to Mariano BURDEN primary RN in cath lab.   Kept pt NPO,  all his belonging to wife.           Lisbet Marinelli RN

## 2025-03-03 NOTE — PATIENT PROFILE ADULT - FALL HARM RISK - HARM RISK INTERVENTIONS
Assistance with ambulation/Communicate Risk of Fall with Harm to all staff/Monitor gait and stability/Reinforce activity limits and safety measures with patient and family/Review medications for side effects contributing to fall risk/Sit up slowly, dangle for a short time, stand at bedside before walking/Tailored Fall Risk Interventions/Visual Cue: Yellow wristband and red socks/Bed in lowest position, wheels locked, appropriate side rails in place/Call bell, personal items and telephone in reach/Instruct patient to call for assistance before getting out of bed or chair/Non-slip footwear when patient is out of bed/[place] to call system/Physically safe environment - no spills, clutter or unnecessary equipment/Purposeful Proactive Rounding/Room/bathroom lighting operational, light cord in reach

## 2025-03-03 NOTE — ED PROVIDER NOTE - PHYSICAL EXAMINATION
Constitutional: Well-appearing, well-nourished, comfortable appearing.   Head: Normocephalic, atraumatic.   Eyes: PERRL. EOMI. No conjunctival pallor.   ENT: Moist mucous membranes. Uvula midline. No pharyngeal erythema or exudates.  Neck: No LAD. Supple.  CVS: Regular rate, regular rhythm. Normal S1, S2. No murmurs, rubs, or gallops. No peripheral edema noted.   Respiratory: No respiratory distress. Clear to auscultation bilaterally. No wheezing, rales, or rhonchi.   Abdomen: Abd is soft and non-distended. No tenderness. No rebound, guarding, or rigidity.   MSK: No CVA tenderness bilaterally.   Neuro: Alert and oriented to person, place, and time. Follows commands. Moves all extremities.   Skin: Warm and dry. No rashes.   Psych: Normal affect, cooperative.

## 2025-03-03 NOTE — H&P ADULT - NSHPREVIEWOFSYSTEMS_GEN_ALL_CORE
Denies chest pain, SOB, Palps, UE or LE weakness, changes in vision, AMS, fever, chills, nausea, vomiting, dysphagia, abdominal pain, melena, BRBPR, epistaxis, Rash, or LE edema.  +syncope as above

## 2025-03-03 NOTE — ED PROVIDER NOTE - INTERNATIONAL TRAVEL
HPI:    Pranav Peterson is a 64year old female here today for hospital follow up.    She was discharged from Inpatient hospital, [facility] to Home   Admit Date: 7/11/19  Discharge Date: 7/12/19  Hospital Discharge Diagnosis:    Hematemesis 2/2 pe buPROPion HCl ER, XL, 300 MG Oral Tablet 24 Hr Take 300 mg by mouth daily. VYVANSE 30 MG Oral Cap Take 30 mg by mouth daily. Cyclobenzaprine HCl 10 MG Oral Tab Take 1 tablet (10 mg total) by mouth 3 (three) times daily as needed for Muscle spasms. grandmother; Depression in her father; Heart Attack in her father; Heart Disease in her father; Hypertension in her father; Personality Disorder in her daughter. She  reports that she has never smoked.  She has never used smokeless tobacco. She reports th motor and sensory are grossly intact    ASSESSMENT/ PLAN:   Diagnoses and all orders for this visit:    Hematemesis, presence of nausea not specified    PUD (peptic ulcer disease)      1. Hematemesis 2/2 PUD - Stable on prescription medication.   No new iss No

## 2025-03-03 NOTE — ED ADULT NURSE NOTE - OBJECTIVE STATEMENT
Er pt sent by Cardiology for cath today,  pt stated have been having syncope episode x 4 months, some dizziness, denies any CP with episodes, failed a stress.   lungs clear, resp. equal, labs sent, IV to right lat. arm 20g angio cath,  On CM with NSR,    Pending dispo.      Lisbet Marinelli RN

## 2025-03-03 NOTE — ED PROVIDER NOTE - CLINICAL SUMMARY MEDICAL DECISION MAKING FREE TEXT BOX
Fellow MD Dusty Coulter: 64-year-old male with past medical history of HTN, DM, BPH, presenting to the ED for evaluation status post abnormal stress test and plan for cardiac cath.  Patient was advised to present to the ED for a cardiac cath after he had an abnormal stress test that showed apical ischemia.  Patient denies any active shortness of breath, or chest pain at this time.  Additionally, patient denies fever, chills, nausea, vomiting, diarrhea, diaphoresis, abdominal pain, dysuria, hematuria, flank pain, headaches, and any additional complaints at this time.  No recent travel or sick contacts.    On arrival to the ED, the patient is awake, alert, and oriented x 3.  No active chest pain at this time.  He is hypertensive, nontachycardic, afebrile, and satting 98% on room air.  Physical exam is otherwise within normal limits.  Plan for preprocedure labs, coags, type and screen, EKG, troponin, and admission to cardiology.

## 2025-03-03 NOTE — ASU PATIENT PROFILE, ADULT - FALL HARM RISK - RISK INTERVENTIONS

## 2025-03-03 NOTE — H&P ADULT - NS ATTEND AMEND GEN_ALL_CORE FT
Patient seen and examined. Agree with plan as detailed in PA/NP Note.     For [facility] today for apical ischemia on stress and mildly reduced EF    All risks, benefits, indications, contraindications, inferior alternative options of cardiac cath explained to the patient. The patient understood everything and elects for cardiac cath.     Jose Manuel Diaz MD  Pager: [phone]

## 2025-03-03 NOTE — H&P ADULT - ASSESSMENT
64 year old male with PMH HTN, DM, HLD, hx cerical decompression surgery 4/2024, with recurrent syncopal episodes, who presents from the office after and abnormal echo and Nuclear stress test.  He reports MOCK, denies exertional chest pain.  TTE with mild LV systolic dysfunction and NST with anterior and apical ischemia.      --admit to tele  --check labs  --continue Norvasc 5mg daily, HCTZ 25mg daily, Crestor 20mg daily, Flomax 0.4mg daily  --recommend C to eval for obstructive CAD,.  R/B/A of procedure d/w patient, understands and agrees.  --Neuro consulted    d/w Dr Joe Perez PA  [phone]

## 2025-03-03 NOTE — ED ADULT NURSE NOTE - SUICIDE SCREENING QUESTION 2
"Rhoda Cooper is a 80year old male here for  Chief Complaint   Patient presents with   â¢ Follow-up     Denies latex allergy or sensitivity. Medication verified and med list updated. PCP and Pharmacy verified. Social History     Tobacco Use   Smoking Status Former   â¢ Current packs/day: 0.00   â¢ Average packs/day: 1 pack/day for 45.0 years (45.0 pk-yrs)   â¢ Types: Cigarettes   â¢ Start date: 1/24/1963   â¢ Quit date: 1/24/2008   â¢ Years since quitting: 15.3   Smokeless Tobacco Never   Vaping Use   Vaping Status never used     Advance Directives Filed: Yes    ECOG:   ECOG [05/11/23 1248]   ECOG Performance Status 1       Vitals:    Visit Vitals  Ht 5' 11"" (1.803 m)   Wt 101.5 kg (223 lb 12.3 oz)   BMI 31.21 kg/mÂ²       These vital signs are:  Within defined parameters (Per Reference \""Defined Limits Hospital Outpatient Department (HOD)\"")    Height: No.  Ht Readings from Last 1 Encounters:   05/11/23 5' 11\"" (1.803 m)     Weight:Yes, shoes on. Wt Readings from Last 3 Encounters:   05/11/23 101.5 kg (223 lb 12.3 oz)   03/03/23 105.9 kg (233 lb 8 oz)   11/30/22 108 kg (238 lb 1.6 oz)       BMI: Body mass index is 31.21 kg/mÂ².     REVIEW OF SYSTEMS  GENERAL:  Patient denies headache, fevers, chills, night sweats, excessive fatigue, change in appetite, weight loss, dizziness complains of loss of appetite, dizziness and chills  ALLERGIC/IMMUNOLOGIC: Verified allergies: Yes  EYES:  Patient denies significant visual difficulties, double vision, blurred vision  ENT/MOUTH: Patient denies problems with hearing, sore throat, sinus drainage, mouth sores  ENDOCRINE:  Patient denies diabetes, thyroid disease, hormone replacement, hot flashes  HEMATOLOGIC/LYMPHATIC: Patient denies easy bruising, bleeding, tender lymph nodes, swollen lymph nodes  BREASTS: Patient denies abnormal masses of breast, nipple discharge, pain  RESPIRATORY:  Patient denies lung pain with breathing, cough, coughing up blood, shortness of " breath  CARDIOVASCULAR:  Patient denies anginal chest pain, palpitations, shortness of breath when lying flat, peripheral edema  GASTROINTESTINAL: Patient denies abdominal pain , nausea, vomiting, diarrhea, GI bleeding, constipation, change in bowel habits, heartburn, sensation of feeling full, difficulty swallowing  : Patient denies blood in the urine, burning with urination, frequency, urgency, hesitancy, incontinence  MUSCULOSKELETAL:  Patient denies joint pain, bone pain, joint swelling, redness, decreased range of motion  SKIN:  Patient denies chronic rashes, inflammation, ulcerations, skin changes, itching  NEUROLOGIC:  Patient denies loss of balance, areas of focal weakness, abnormal gait, sensory problems, numbness, tingling  PSYCHIATRIC: Patient denies insomnia, depression, anxiety    This patient reported abnormal symptoms that needed immediate verbal communication: No No

## 2025-03-03 NOTE — H&P ADULT - NSHPPHYSICALEXAM_GEN_ALL_CORE
Gen- NAD  HEENT- No icterus, no pallor, no JVD  CV- S1S2 RRR no M/R/G 2+ pulses BL RA  Resp: CTA B/L no W/R/R  Lymph: no edema, no lymphadenopathy  Skin: warm to touch, normal turgor  Psych: appropriate mood and affect  Neuro: no focal deficits  Ortho: moves all extremities, strength 4+

## 2025-03-03 NOTE — ED PROVIDER NOTE - TOBACCO USE
Unknown if ever smoked
78yo male with history of HTN, HLD, CAD s/p PCI x 4, asthma, s/p left nephrectomy, bladder CA, currently on chemo and radiation, Following [place] [facility] presents to the ED c/o worsening weakness. Patient notes the weakness has worsened over the past 4 days. Notes he was at [facility] 3 days ago for similar symptoms and was diagnosed with a RLL PNA, discharged and started on oral abx, Doxycycline. Patient notes since being discharged the he became progressively worsening weakness. Notes frequent falls today. Did not hit his head. Notes decrease in po intake. Denies chest pain, sob, lightheadedness, dizziness, nausea vomiting.

## 2025-03-03 NOTE — ED ADULT TRIAGE NOTE - CHIEF COMPLAINT QUOTE
pt s/p stress test, was told to come to ED for cardiac cath.  pt denies SOB, chest pain.  appears in no acute distress.  Hx:  DM, HTN

## 2025-03-03 NOTE — ED ADULT NURSE NOTE - NSFALLHARMRISKINTERV_ED_ALL_ED

## 2025-03-03 NOTE — PRE PROCEDURE NOTE - PRE PROCEDURE EVALUATION
Pre Procedural Sedation Evaluation    History and physical reviewed  Medications reviewed  Labs reviewed  EKG reviewed    Anticoagulation: NA  Dentures: None  Last PO intake: 3/3 at 0700    Pre-Procedure Physical Assessment  Mental status: Alert and oriented x 3  Level of consciousness: 1  Cardiac assessment: Stable  Pulmonary assessment: Stable  Obstructive sleep apnea: No  Aspiration risk: No  Mallampati score: 2  ASA Classification: 2  Prior Sedative or Anesthesia Experience: No complications  Informed consent by responsible adult: Yes  Based on today's assessment, anesthesia consult requested: No

## 2025-03-03 NOTE — ED ADULT NURSE NOTE - NS ED NURSE REPORT GIVEN TO FT
Medication Management [address]  [phone] (phone)  [phone] (fax)    Mr. Ingrid Landry is a 73 y.o.  male with history of Afib who presents today for anticoagulation monitoring and adjustment. Patient verifies current dosing regimen and tablet strength. No missed or extra doses. Patient denies s/s bleeding/bruising/swelling/SOB/chest pain  No blood in urine or stool. No dietary changes. No changes in medication/OTC agents/Herbals. No change in alcohol use or tobacco use. No change in activity level. Patient denies headaches/dizziness/lightheadedness/falls. No vomiting/diarrhea or acute illness. No Procedures scheduled in the future at this time. Assessment:   Lab Results   Component Value Date    INR 2.60 (H) 01/05/2022    INR 3.00 (H) 12/01/2021    INR 2.50 (H) 10/27/2021     INR therapeutic   Recent Labs     01/05/22  1020   INR 2.60*       Plan:  Continue Coumadin 5 mg MWF, 2.5 mg daily. Recheck INR in 5 week(s). Patient reminded to call the Anticoagulation Clinic with any signs or symptoms of bleeding or with any medication changes. Patient given instructions utilizing the teach back method. Provided patient lab voucher to get hemoglobin/hematocrit checked. After visit summary printed and reviewed with patient. Discharged ambulatory in no apparent distress.     For Pharmacy Admin Tracking Only     Intervention Detail: Lab(s) Ordered   Total # of Interventions Recommended: 1   Total # of Interventions Accepted: 1   Time Spent (min): 20    Chris Rodriguez, PharmD, BCPS  1/5/2022  11:50 AM to cath lab.

## 2025-03-03 NOTE — ED PROVIDER NOTE - ATTENDING CONTRIBUTION TO CARE
63 yo M with h/o HTN, DM who presents to the ED, sent in by cardiology, for abnormal stress test. Pt reports stress test was 3 days ago, was told to come in at that time but had some things to do at home first. Was sent in for admission today for cardiac cath. Cardiology requesting blood work, ekg and admission to cardiology for cath. Pt denies any current symptoms or complaints.

## 2025-03-04 ENCOUNTER — RESULT REVIEW (OUTPATIENT)
Age: 65
End: 2025-03-04

## 2025-03-04 LAB
A1C WITH ESTIMATED AVERAGE GLUCOSE RESULT: 6.8 % — HIGH (ref 4–5.6)
ADD ON TEST-SPECIMEN IN LAB: SIGNIFICANT CHANGE UP
ANION GAP SERPL CALC-SCNC: 12 MMOL/L — SIGNIFICANT CHANGE UP (ref 7–14)
BUN SERPL-MCNC: 14 MG/DL — SIGNIFICANT CHANGE UP (ref 7–23)
CALCIUM SERPL-MCNC: 9.5 MG/DL — SIGNIFICANT CHANGE UP (ref 8.4–10.5)
CHLORIDE SERPL-SCNC: 103 MMOL/L — SIGNIFICANT CHANGE UP (ref 98–107)
CHOLEST SERPL-MCNC: 187 MG/DL — SIGNIFICANT CHANGE UP
CO2 SERPL-SCNC: 22 MMOL/L — SIGNIFICANT CHANGE UP (ref 22–31)
CREAT SERPL-MCNC: 1.14 MG/DL — SIGNIFICANT CHANGE UP (ref 0.5–1.3)
EGFR: 73 ML/MIN/1.73M2 — SIGNIFICANT CHANGE UP
EGFR: 73 ML/MIN/1.73M2 — SIGNIFICANT CHANGE UP
ESTIMATED AVERAGE GLUCOSE: 148 — SIGNIFICANT CHANGE UP
GLUCOSE SERPL-MCNC: 180 MG/DL — HIGH (ref 70–99)
HCT VFR BLD CALC: 40.9 % — SIGNIFICANT CHANGE UP (ref 39–50)
HDLC SERPL-MCNC: 50 MG/DL — SIGNIFICANT CHANGE UP
HGB BLD-MCNC: 14.1 G/DL — SIGNIFICANT CHANGE UP (ref 13–17)
LDLC SERPL DIRECT ASSAY-MCNC: 127 MG/DL — SIGNIFICANT CHANGE UP (ref 73–160)
LIPID PNL WITH DIRECT LDL SERPL: 125 MG/DL — HIGH
MAGNESIUM SERPL-MCNC: 1.9 MG/DL — SIGNIFICANT CHANGE UP (ref 1.6–2.6)
MCHC RBC-ENTMCNC: 29.8 PG — SIGNIFICANT CHANGE UP (ref 27–34)
MCHC RBC-ENTMCNC: 34.5 G/DL — SIGNIFICANT CHANGE UP (ref 32–36)
MCV RBC AUTO: 86.5 FL — SIGNIFICANT CHANGE UP (ref 80–100)
NON HDL CHOLESTEROL: 137 MG/DL — HIGH
NRBC # BLD AUTO: 0 K/UL — SIGNIFICANT CHANGE UP (ref 0–0)
NRBC # FLD: 0 K/UL — SIGNIFICANT CHANGE UP (ref 0–0)
NRBC BLD AUTO-RTO: 0 /100 WBCS — SIGNIFICANT CHANGE UP (ref 0–0)
PHOSPHATE SERPL-MCNC: 2.2 MG/DL — LOW (ref 2.5–4.5)
PLATELET # BLD AUTO: 235 K/UL — SIGNIFICANT CHANGE UP (ref 150–400)
POTASSIUM SERPL-MCNC: 4.4 MMOL/L — SIGNIFICANT CHANGE UP (ref 3.5–5.3)
POTASSIUM SERPL-SCNC: 4.4 MMOL/L — SIGNIFICANT CHANGE UP (ref 3.5–5.3)
RBC # BLD: 4.73 M/UL — SIGNIFICANT CHANGE UP (ref 4.2–5.8)
RBC # FLD: 12.9 % — SIGNIFICANT CHANGE UP (ref 10.3–14.5)
SODIUM SERPL-SCNC: 137 MMOL/L — SIGNIFICANT CHANGE UP (ref 135–145)
TRIGL SERPL-MCNC: 65 MG/DL — SIGNIFICANT CHANGE UP
WBC # BLD: 4.38 K/UL — SIGNIFICANT CHANGE UP (ref 3.8–10.5)
WBC # FLD AUTO: 4.38 K/UL — SIGNIFICANT CHANGE UP (ref 3.8–10.5)

## 2025-03-04 PROCEDURE — 93010 ELECTROCARDIOGRAM REPORT: CPT

## 2025-03-04 PROCEDURE — 93306 TTE W/DOPPLER COMPLETE: CPT | Mod: 26

## 2025-03-04 RX ORDER — NALOXEGOL OXALATE 12.5 MG/1
25 TABLET, FILM COATED ORAL
Refills: 0 | Status: DISCONTINUED | OUTPATIENT
Start: 2025-03-04 | End: 2025-03-05

## 2025-03-04 RX ORDER — GABAPENTIN 400 MG/1
300 CAPSULE ORAL
Refills: 0 | Status: DISCONTINUED | OUTPATIENT
Start: 2025-03-04 | End: 2025-03-06

## 2025-03-04 RX ORDER — GABAPENTIN 400 MG/1
1 CAPSULE ORAL
Refills: 0 | DISCHARGE

## 2025-03-04 RX ORDER — OXYCODONE HYDROCHLORIDE 30 MG/1
1 TABLET ORAL
Refills: 0 | DISCHARGE

## 2025-03-04 RX ORDER — TRAMADOL HYDROCHLORIDE 50 MG/1
1 TABLET, FILM COATED ORAL
Refills: 0 | DISCHARGE

## 2025-03-04 RX ORDER — NIFEDIPINE 30 MG
60 TABLET, EXTENDED RELEASE 24 HR ORAL DAILY
Refills: 0 | Status: DISCONTINUED | OUTPATIENT
Start: 2025-03-04 | End: 2025-03-05

## 2025-03-04 RX ORDER — CARVEDILOL 3.12 MG/1
12.5 TABLET, FILM COATED ORAL EVERY 12 HOURS
Refills: 0 | Status: DISCONTINUED | OUTPATIENT
Start: 2025-03-04 | End: 2025-03-05

## 2025-03-04 RX ADMIN — CARVEDILOL 12.5 MILLIGRAM(S): 3.12 TABLET, FILM COATED ORAL at 17:16

## 2025-03-04 RX ADMIN — TAMSULOSIN HYDROCHLORIDE 0.4 MILLIGRAM(S): 0.4 CAPSULE ORAL at 21:42

## 2025-03-04 RX ADMIN — ROSUVASTATIN CALCIUM 40 MILLIGRAM(S): 20 TABLET, FILM COATED ORAL at 21:42

## 2025-03-04 RX ADMIN — TICAGRELOR 90 MILLIGRAM(S): 90 TABLET ORAL at 17:16

## 2025-03-04 RX ADMIN — Medication 81 MILLIGRAM(S): at 11:17

## 2025-03-04 RX ADMIN — TICAGRELOR 90 MILLIGRAM(S): 90 TABLET ORAL at 05:32

## 2025-03-04 RX ADMIN — AMLODIPINE BESYLATE 5 MILLIGRAM(S): 10 TABLET ORAL at 05:32

## 2025-03-04 NOTE — PHARMACOTHERAPY INTERVENTION NOTE - COMMENTS
Medication history is complete based on information provided by patient and wife, and confirmed with Surescripts. Medication list updated in Outpatient Medication Record (OMR).    Home Medications:  amLODIPine 2.5 mg oral tablet: 1 tab(s) orally once a day   gabapentin 300 mg oral tablet: 1 tab(s) orally 3 times a day (patient says he normally takes it 3x/week at bedtime)  hydroCHLOROthiazide 12.5 mg oral tablet: 1 tab(s) orally once a day   Movantik 25 mg oral tablet: 1 tab(s) orally once a day (patient takes it every other day)   Ozempic 8 mg/3 mL (2 mg dose) subcutaneous solution: 2 milligram(s) subcutaneously once a week WEDNESDAY   rosuvastatin 20 mg oral tablet: 1 tab(s) orally once a day   RoxyBond 15 mg oral tablet: 1 tab(s) orally 3 times a day   tamsulosin 0.4 mg oral capsule: 1 cap(s) orally 2 times a day   traMADol 50 mg oral tablet: 1 tab(s) orally 3 times a day as needed for  pain (patient reports he rarely takes tramadol)

## 2025-03-04 NOTE — PHARMACOTHERAPY INTERVENTION NOTE - COMMENTS
Current medications reviewed with the patient and his wife. Current medication schedule was discussed in detail including: medication name, indication, dose, administration times, treatment duration, side effects, and special instructions. Counseled on new medications aspirin, ticagrelor, and carvedilol. Reviewed importance of taking DAPT; also reviewed signs and symptoms of bleeding to watch for and when to seek medical attention. Also discussed medication changes (amlodipine changed to nifedipine, dc'd HCTZ, rosuvastatin changed from 20mg to 40mg). Questions and concerns were answered and addressed. Patient and wife demonstrated understanding. Patient provided with educational medication cards. Patient informed that medication list may change prior to discharge.    New medications: aspirin, ticagrelor, carvedilol    Sulema Tapia, PharmD, [facility]  Clinical Pharmacy Specialist  [id number]  Discharge medications reviewed with the patient and his wife. Current medication schedule was discussed in detail including: medication name, indication, dose, administration times, treatment duration, side effects, and special instructions. Reviewed importance of taking DAPT; also reviewed signs and symptoms of bleeding to watch for and when to seek medical attention. Also discussed medication changes (amlodipine changed to losartan, dc'd HCTZ, dc'd Ozempic, rosuvastatin changed from 20mg to 40mg). Questions and concerns were answered and addressed. Patient and wife demonstrated understanding. Patient provided with educational medication cards.     New medications: aspirin, ticagrelor, metoprolol, losartan, Farxiga    Sluema Tapia, PharmD, [facility]  Clinical Pharmacy Specialist  [id number]

## 2025-03-04 NOTE — PROGRESS NOTE ADULT - SUBJECTIVE AND OBJECTIVE BOX
Date of Service  : 03-04-25     INTERVAL HPI/OVERNIGHT EVENTS: I feel fine.   Vital Signs Last 24 Hrs  T(C): 36.6 (04 Mar 2025 12:00), Max: 36.7 (03 Mar 2025 17:00)  T(F): 97.8 (04 Mar 2025 12:00), Max: 98.1 (03 Mar 2025 17:00)  HR: 97 (04 Mar 2025 12:00) (84 - 98)  BP: 130/87 (04 Mar 2025 12:00) (112/86 - 144/85)  BP(mean): --  RR: 18 (04 Mar 2025 12:00) (15 - 19)  SpO2: 100% (04 Mar 2025 12:00) (96% - 100%)    Parameters below as of 04 Mar 2025 12:00  Patient On (Oxygen Delivery Method): room air      I&O's Summary    MEDICATIONS  (STANDING):  aspirin enteric coated 81 milliGRAM(s) Oral daily  carvedilol 12.5 milliGRAM(s) Oral every 12 hours  influenza   Vaccine 0.5 milliLiter(s) IntraMuscular once  NIFEdipine XL 60 milliGRAM(s) Oral daily  rosuvastatin 40 milliGRAM(s) Oral at bedtime  sodium chloride 0.9%. 500 milliLiter(s) (75 mL/Hr) IV Continuous <Continuous>  tamsulosin 0.4 milliGRAM(s) Oral at bedtime  ticagrelor 90 milliGRAM(s) Oral every 12 hours    MEDICATIONS  (PRN):    LABS:                        14.1   4.38  )-----------( 235      ( 04 Mar 2025 09:19 )             40.9     03-04    137  |  103  |  14  ----------------------------<  180[H]  4.4   |  22  |  1.14    Ca    9.5      04 Mar 2025 09:19  Phos  2.2     03-04  Mg     1.90     03-04    TPro  7.1  /  Alb  3.8  /  TBili  <0.2  /  DBili  x   /  AST  15  /  ALT  17  /  AlkPhos  85  03-03    PT/INR - ( 03 Mar 2025 12:15 )   PT: 12.6 sec;   INR: 1.09 ratio         PTT - ( 03 Mar 2025 12:15 )  PTT:30.2 sec  Urinalysis Basic - ( 04 Mar 2025 09:19 )    Color: x / Appearance: x / SG: x / pH: x  Gluc: 180 mg/dL / Ketone: x  / Bili: x / Urobili: x   Blood: x / Protein: x / Nitrite: x   Leuk Esterase: x / RBC: x / WBC x   Sq Epi: x / Non Sq Epi: x / Bacteria: x      CAPILLARY BLOOD GLUCOSE            Urinalysis Basic - ( 04 Mar 2025 09:19 )    Color: x / Appearance: x / SG: x / pH: x  Gluc: 180 mg/dL / Ketone: x  / Bili: x / Urobili: x   Blood: x / Protein: x / Nitrite: x   Leuk Esterase: x / RBC: x / WBC x   Sq Epi: x / Non Sq Epi: x / Bacteria: x      REVIEW OF SYSTEMS:  CONSTITUTIONAL: No fever, weight loss, or fatigue  EYES: No eye pain, visual disturbances, or discharge  ENMT:  No difficulty hearing, tinnitus, vertigo; No sinus or throat pain  NECK: No pain or stiffness  RESPIRATORY: No cough, wheezing, chills or hemoptysis; No shortness of breath  CARDIOVASCULAR: No chest pain, palpitations, dizziness, or leg swelling  GASTROINTESTINAL: No abdominal or epigastric pain. No nausea, vomiting, or hematemesis; No diarrhea or constipation. No melena or hematochezia.  GENITOURINARY: No dysuria, frequency, hematuria, or incontinence  NEUROLOGICAL: No headaches, memory loss, loss of strength, numbness, or tremors      RADIOLOGY & ADDITIONAL TESTS:    Consultant(s) Notes Reviewed:  [x ] YES  [ ] NO    PHYSICAL EXAM:  GENERAL: NAD, well-groomed, well-developed,not in any distress ,  HEAD:  Atraumatic, Normocephalic  EYES: EOMI, PERRLA, conjunctiva and sclera clear  ENMT: No tonsillar erythema, exudates, or enlargement; Moist mucous membranes, Good dentition, No lesions  NECK: Supple, No JVD, Normal thyroid  NERVOUS SYSTEM:  Alert & Oriented X3, No focal deficit   CHEST/LUNG: Good air entry bilateral with no  rales, rhonchi, wheezing, or rubs  HEART: Regular rate and rhythm; No murmurs, rubs, or gallops  ABDOMEN: Soft, Nontender, Nondistended; Bowel sounds present  EXTREMITIES:  2+ Peripheral Pulses, No clubbing, cyanosis, or edema    Care Discussed with Consultants/Other Providers [ x] YES  [ ] NO Aklief counseling:  Patient advised to apply a pea-sized amount only at bedtime and wait 30 minutes after washing their face before applying.  If too drying, patient may add a non-comedogenic moisturizer.  The most commonly reported side effects including irritation, redness, scaling, dryness, stinging, burning, itching, and increased risk of sunburn.  The patient verbalized understanding of the proper use and possible adverse effects of retinoids.  All of the patient's questions and concerns were addressed.

## 2025-03-04 NOTE — PROGRESS NOTE ADULT - SUBJECTIVE AND OBJECTIVE BOX
DATE OF SERVICE: 03-04-25    Patient denies chest pain or shortness of breath.   Review of symptoms otherwise negative.    MEDICATIONS:  aspirin enteric coated 81 milliGRAM(s) Oral daily  carvedilol 12.5 milliGRAM(s) Oral every 12 hours  influenza   Vaccine 0.5 milliLiter(s) IntraMuscular once  NIFEdipine XL 60 milliGRAM(s) Oral daily  rosuvastatin 40 milliGRAM(s) Oral at bedtime  sodium chloride 0.9%. 500 milliLiter(s) IV Continuous <Continuous>  tamsulosin 0.4 milliGRAM(s) Oral at bedtime  ticagrelor 90 milliGRAM(s) Oral every 12 hours      LABS:                        14.1   4.38  )-----------( 235      ( 04 Mar 2025 09:19 )             40.9       Hemoglobin: 14.1 g/dL (03-04 @ 09:19)  Hemoglobin: 13.6 g/dL (03-03 @ 12:15)      03-04    137  |  103  |  14  ----------------------------<  180[H]  4.4   |  22  |  1.14    Ca    9.5      04 Mar 2025 09:19  Phos  2.2     03-04  Mg     1.90     03-04    TPro  7.1  /  Alb  3.8  /  TBili  <0.2  /  DBili  x   /  AST  15  /  ALT  17  /  AlkPhos  85  03-03    Creatinine Trend: 1.14<--, 1.13<--    PHYSICAL EXAM:  T(C): 36.6 (03-04-25 @ 12:00), Max: 36.7 (03-03-25 @ 14:32)  HR: 97 (03-04-25 @ 12:00) (84 - 98)  BP: 130/87 (03-04-25 @ 12:00) (112/86 - 144/85)  RR: 18 (03-04-25 @ 12:00) (15 - 19)  SpO2: 100% (03-04-25 @ 12:00) (96% - 100%)  Wt(kg): --    I&O's Summary      General:  Alert and Oriented * 3.   Head: Normocephalic and atraumatic.   Neck: No JVD. No bruits. Supple. Does not appear to be enlarged.   Cardiovascular: + S1,S2 ; RRR Soft systolic murmur at the left lower sternal border. No rubs noted.    Lungs: CTA b/l. No rhonchi, rales or wheezes.   Abdomen: + BS, soft. Non tender. Non distended. No rebound. No guarding.   Extremities: No clubbing/cyanosis/edema.   Neurologic: Moves all four extremities. Full range of motion.   Skin: Warm and moist. The patient's skin has normal elasticity and good skin turgor.   Psychiatric: Appropriate mood and affect.  Musculoskeletal: Normal range of motion, normal strength           64 year old male with PMH HTN, DM, HLD, hx cerical decompression surgery 4/2024, with recurrent syncopal episodes, who presents from the office after and abnormal echo and Nuclear stress test.  He reports MOCK, denies exertional chest pain.  TTE with mild LV systolic dysfunction and NST with anterior and apical ischemia    - pre-allred cath, 99% prox LAD, 80% mid LCX, 99% small vessel OM1, RCA with mild disease and rPDA with 80% lesion  - s/p Stentx2 from prox to mid LAD, has 80% mid LCX lesion for staged PCI tomorrow, and has 80% distal rPDA lesion that will be managed medically  - c/w ASA and Brilinta  - stop HCTZ start coreg 12.5 mg BID  - stop Amlodipine and start nifedipine 60 mg daily  - c/w crestor  - check A1c and Lipid panel  - check TTE          Jose Manuel Diaz MD  Pager: [phone]  DATE OF SERVICE: 03-04-25    Patient denies chest pain or shortness of breath.   Review of symptoms otherwise negative.    MEDICATIONS:  aspirin enteric coated 81 milliGRAM(s) Oral daily  carvedilol 12.5 milliGRAM(s) Oral every 12 hours  influenza   Vaccine 0.5 milliLiter(s) IntraMuscular once  NIFEdipine XL 60 milliGRAM(s) Oral daily  rosuvastatin 40 milliGRAM(s) Oral at bedtime  sodium chloride 0.9%. 500 milliLiter(s) IV Continuous <Continuous>  tamsulosin 0.4 milliGRAM(s) Oral at bedtime  ticagrelor 90 milliGRAM(s) Oral every 12 hours      LABS:                        14.1   4.38  )-----------( 235      ( 04 Mar 2025 09:19 )             40.9       Hemoglobin: 14.1 g/dL (03-04 @ 09:19)  Hemoglobin: 13.6 g/dL (03-03 @ 12:15)      03-04    137  |  103  |  14  ----------------------------<  180[H]  4.4   |  22  |  1.14    Ca    9.5      04 Mar 2025 09:19  Phos  2.2     03-04  Mg     1.90     03-04    TPro  7.1  /  Alb  3.8  /  TBili  <0.2  /  DBili  x   /  AST  15  /  ALT  17  /  AlkPhos  85  03-03    Creatinine Trend: 1.14<--, 1.13<--    PHYSICAL EXAM:  T(C): 36.6 (03-04-25 @ 12:00), Max: 36.7 (03-03-25 @ 14:32)  HR: 97 (03-04-25 @ 12:00) (84 - 98)  BP: 130/87 (03-04-25 @ 12:00) (112/86 - 144/85)  RR: 18 (03-04-25 @ 12:00) (15 - 19)  SpO2: 100% (03-04-25 @ 12:00) (96% - 100%)  Wt(kg): --    I&O's Summary      General:  Alert and Oriented * 3.   Head: Normocephalic and atraumatic.   Neck: No JVD. No bruits. Supple. Does not appear to be enlarged.   Cardiovascular: + S1,S2 ; RRR Soft systolic murmur at the left lower sternal border. No rubs noted.    Lungs: CTA b/l. No rhonchi, rales or wheezes.   Abdomen: + BS, soft. Non tender. Non distended. No rebound. No guarding.   Extremities: No clubbing/cyanosis/edema.   Neurologic: Moves all four extremities. Full range of motion.   Skin: Warm and moist. The patient's skin has normal elasticity and good skin turgor.   Psychiatric: Appropriate mood and affect.  Musculoskeletal: Normal range of motion, normal strength       TELE: NSR        64 year old male with PMH HTN, DM, HLD, hx cerical decompression surgery 4/2024, with recurrent syncopal episodes, who presents from the office after and abnormal echo and Nuclear stress test.  He reports MOCK, denies exertional chest pain.  TTE with mild LV systolic dysfunction and NST with anterior and apical ischemia    - pre-allred cath, 99% prox LAD, 80% mid LCX, 99% small vessel OM1, RCA with mild disease and rPDA with 80% lesion  - s/p Stentx2 from prox to mid LAD, has 80% mid LCX lesion for staged PCI tomorrow, and has 80% distal rPDA lesion that will be managed medically  - c/w ASA and Brilinta  - stop HCTZ start coreg 12.5 mg BID  - stop Amlodipine and start nifedipine 60 mg daily  - c/w crestor  - check A1c and Lipid panel  - check TTE          Jose Manuel Diaz MD  Pager: [phone]

## 2025-03-04 NOTE — CONSULT NOTE ADULT - ASSESSMENT
64 year old male with PMH HTN, DM, HLD, hx cervical decompression surgery 4/2024, with recurrent syncopal episodes, who presents from the office after and abnormal echo and Nuclear stress test.  No prodromal symptoms to snycope, no clear post ictal phenomenon  Planned for additional cardiac stent to be placed tomorrow    Recurrent syncope - likely vasovagal but given recurrent episodes and severe CAD will r/o VBI  Prior cervical decompression    - HCTZ was stopped, may also have been contributing to syncope  - check CTH and CTA H/N   - routine eeg, low suspicion - can do as outpatient if needed   - tele montior  - check orthostatics  - TTE reviewed, no clear cardiac etiology  - on ASA and Brilinta per Cardio  - additional stent to be placed tomorrow - no neurologic objections to proceeding,     Celeste Chavez DO  Vascular Neurology  Office [phone]  Available via [employer] Teams        
64 year old male with PMH HTN, DM, HLD, BPH ,hx cerical decompression surgery 4/2024, with recurrent syncopal episodes, who presents from the office after and abnormal echo and Nuclear stress test.  He reports MOCK, denies exertional chest pain.  TTE with mild LV systolic dysfunction and NST with anterior and apical ischemia. He is planned for cardiac cath today.

## 2025-03-04 NOTE — CONSULT NOTE ADULT - SUBJECTIVE AND OBJECTIVE BOX
Neurology Consult    Reason for Consult: Patient is a 64y old  Male who presents with a chief complaint of Chest Pain (04 Mar 2025 13:29)      HPI:  64 year old male with PMH HTN, DM, HLD, hx cerical decompression surgery 4/2024, with recurrent syncopal episodes, who presents from the office after and abnormal echo and Nuclear stress test.  He reports MOCK, denies exertional chest pain.  TTE with mild LV systolic dysfunction and NST with anterior and apical ischemia.      No prior hx CAD, MI, CHF, No prior cardiac work up.  Has not yet been evaluated by a Neurologist for recurrent syncope.   No imaging performed as of yet         PAST MEDICAL & SURGICAL HISTORY:  Diabetes mellitus  type II, diagnosed in 2018      Hypertension      Snoring  MACEY precautions -- responds affirmatively to STOP BANG questionnaire      Enlarged prostate      H/O phimosis  2019      Arthropathy  right shoulder -- never worked up      H/O cholelithiasis  s/p lap cholecystectomy      Enlarged prostate  negatie biopsy      History of back surgery      S/P left rotator cuff repair      S/P right rotator cuff repair          Allergies: Allergies    No Known Allergies    Intolerances        Social History: Denies toxic habits including tobacco, ETOH or illicit drugs.    Family History: FAMILY HISTORY:  FH: diabetes mellitus  father and brother    . No family history of strokes    Medications: MEDICATIONS  (STANDING):  aspirin enteric coated 81 milliGRAM(s) Oral daily  carvedilol 12.5 milliGRAM(s) Oral every 12 hours  influenza   Vaccine 0.5 milliLiter(s) IntraMuscular once  NIFEdipine XL 60 milliGRAM(s) Oral daily  rosuvastatin 40 milliGRAM(s) Oral at bedtime  sodium chloride 0.9%. 500 milliLiter(s) (75 mL/Hr) IV Continuous <Continuous>  tamsulosin 0.4 milliGRAM(s) Oral at bedtime  ticagrelor 90 milliGRAM(s) Oral every 12 hours    MEDICATIONS  (PRN):      Review of Systems:  CONSTITUTIONAL:  No weight loss, fever, chills, weakness or fatigue.  HEENT:  Eyes:  No visual loss, blurred vision, double vision or yellow sclera. Ears, Nose, Throat:  No hearing loss, sneezing, congestion, runny nose or sore throat.  SKIN:  No rash or itching.  CARDIOVASCULAR:  No chest pain, chest pressure or chest discomfort. No palpitations or edema.  RESPIRATORY:  No shortness of breath, cough or sputum.  GASTROINTESTINAL:  No anorexia, nausea, vomiting or diarrhea. No abdominal pain or blood.  GENITOURINARY:  No burning on urination or incontinence   NEUROLOGICAL:  No headache, dizziness, syncope, paralysis, ataxia, numbness or tingling in the extremities. No change in bowel or bladder control. no limb weakness. no vision changes.   MUSCULOSKELETAL:  No muscle, back pain, joint pain or stiffness.  HEMATOLOGIC:  No anemia, bleeding or bruising.  LYMPHATICS:  No enlarged nodes. No history of splenectomy.  PSYCHIATRIC:  No history of depression or anxiety.  ENDOCRINOLOGIC:  No reports of sweating, cold or heat intolerance. No polyuria or polydipsia.      Vitals:  Vital Signs Last 24 Hrs  T(C): 36.6 (04 Mar 2025 12:00), Max: 36.7 (04 Mar 2025 05:30)  T(F): 97.8 (04 Mar 2025 12:00), Max: 98.1 (04 Mar 2025 05:30)  HR: 97 (04 Mar 2025 12:00) (84 - 98)  BP: 130/87 (04 Mar 2025 12:00) (112/86 - 143/87)  BP(mean): --  RR: 18 (04 Mar 2025 12:00) (15 - 19)  SpO2: 100% (04 Mar 2025 12:00) (96% - 100%)    Parameters below as of 04 Mar 2025 12:00  Patient On (Oxygen Delivery Method): room air        General Exam:   General Appearance: Appropriately dressed and in no acute distress       Head: Normocephalic, atraumatic and no dysmorphic features  Ear, Nose, and Throat: Moist mucous membranes  CVS: S1S2+  Resp: No SOB, no wheeze or rhonchi  GI: soft NT/ND  Extremities: No edema or cyanosis  Skin: No bruises or rashes     Neurological Exam:  Mental Status: Awake, alert and oriented x 3.  Able to follow simple and complex verbal commands. Able to name and repeat. fluent speech. No obvious aphasia or dysarthria noted.   Cranial Nerves: PERRL, EOMI, VFFC, sensation V1-V3 intact,  no obvious facial asymmetry, equal elevation of palate, scm/trap 5/5, tongue is midline on protrusion. hearing is grossly intact.   Motor: Normal bulk, tone and strength throughout. Fine finger movements were intact and symmetric. no tremors or drift noted.    Sensation: Intact to light touch and pinprick throughout. no right/left confusion. no extinction to tactile on DSS.   Reflexes: 1+ throughout at biceps, brachioradialis, triceps, patellars and ankles bilaterally and equal. No clonus. R toe and L toe were both downgoing.  Coordination: No dysmetria on FNF but some trouble with L due to previous surgeries  Gait: Unsteady, + Romberg    Data/Labs/Imaging which I personally reviewed.     Labs:     CBC Full  -  ( 04 Mar 2025 09:19 )  WBC Count : 4.38 K/uL  RBC Count : 4.73 M/uL  Hemoglobin : 14.1 g/dL  Hematocrit : 40.9 %  Platelet Count - Automated : 235 K/uL  Mean Cell Volume : 86.5 fL  Mean Cell Hemoglobin : 29.8 pg  Mean Cell Hemoglobin Concentration : 34.5 g/dL  Auto Neutrophil # : x  Auto Lymphocyte # : x  Auto Monocyte # : x  Auto Eosinophil # : x  Auto Basophil # : x  Auto Neutrophil % : x  Auto Lymphocyte % : x  Auto Monocyte % : x  Auto Eosinophil % : x  Auto Basophil % : x    03-04    137  |  103  |  14  ----------------------------<  180[H]  4.4   |  22  |  1.14    Ca    9.5      04 Mar 2025 09:19  Phos  2.2     03-04  Mg     1.90     03-04    TPro  7.1  /  Alb  3.8  /  TBili  <0.2  /  DBili  x   /  AST  15  /  ALT  17  /  AlkPhos  85  03-03    LIVER FUNCTIONS - ( 03 Mar 2025 12:15 )  Alb: 3.8 g/dL / Pro: 7.1 g/dL / ALK PHOS: 85 U/L / ALT: 17 U/L / AST: 15 U/L / GGT: x           PT/INR - ( 03 Mar 2025 12:15 )   PT: 12.6 sec;   INR: 1.09 ratio         PTT - ( 03 Mar 2025 12:15 )  PTT:30.2 sec  Urinalysis Basic - ( 04 Mar 2025 09:19 )    Color: x / Appearance: x / SG: x / pH: x  Gluc: 180 mg/dL / Ketone: x  / Bili: x / Urobili: x   Blood: x / Protein: x / Nitrite: x   Leuk Esterase: x / RBC: x / WBC x   Sq Epi: x / Non Sq Epi: x / Bacteria: x    c< from: TTE W or WO Ultrasound Enhancing Agent (03.04.25 @ 15:25) >  CONCLUSIONS:      1. The left ventricular cavity is normal in size. Left ventricular wall thickness is normal. Left ventricular systolic function is mildly decreased with a calculated ejection fraction of 52 % by the Malik's biplane method of disks. There are regional wall motion abnormalities present. Hypokinesis of the distal septum and anteroapex. There is mild (grade 1) left ventricular diastolic dysfunction.   2. Normal right ventricular cavity size and normal right ventricular systolic function. Tricuspid annular plane systolic excursion (TAPSE) is 1.7 cm (normal >=1.7 cm).   3. Structurally normal mitral valve with normal leaflet excursion. There is calcification of the mitral valve annulus. There is trace mitral regurgitation.    < end of copied text >        
EP Attending  HISTORY OF PRESENT ILLNESS: HPI:  64 year old male with PMH HTN, DM, HLD, hx cerical decompression surgery 4/2024, with recurrent syncopal episodes, who presents from the office after and abnormal echo and Nuclear stress test.  He reports MOCK, denies exertional chest pain.  TTE with mild LV systolic dysfunction and NST with anterior and apical ischemia.    No prior hx CAD, MI, CHF, No prior cardiac work up.  Has not yet been evaluated by a Neurologist for recurrent syncope. (03 Mar 2025 13:26)    Newly identified/ previously undiagnosed severe multivesel CAD.  Remarkably, has undergone multiple prior surgeries without incident.  No palpitations, and not experiencing angina, albeit at low cardiac workload at home.  Syncopal episodes tend to occur after prolonged standing, no significant prodrome.  A 10 pt ROS is otherwise negative.  He is s/p LAD PCI today.    PAST MEDICAL & SURGICAL HISTORY:  Diabetes mellitus  type II, diagnosed in 2018  Hypertension  Snoring  MACEY precautions -- responds affirmatively to STOP BANG questionnaire  Enlarged prostate  H/O phimosis  2019  Arthropathy  right shoulder -- never worked up  H/O cholelithiasis  s/p lap cholecystectomy  Enlarged prostate  negatie biopsy  History of back surgery  S/P left rotator cuff repair  S/P right rotator cuff repair    MEDICATIONS  (STANDING):  aspirin enteric coated 81 milliGRAM(s) Oral daily  carvedilol 12.5 milliGRAM(s) Oral every 12 hours  influenza   Vaccine 0.5 milliLiter(s) IntraMuscular once  NIFEdipine XL 60 milliGRAM(s) Oral daily  rosuvastatin 40 milliGRAM(s) Oral at bedtime  sodium chloride 0.9%. 500 milliLiter(s) (75 mL/Hr) IV Continuous <Continuous>  tamsulosin 0.4 milliGRAM(s) Oral at bedtime  ticagrelor 90 milliGRAM(s) Oral every 12 hours    Allergies    No Known Allergies    Intolerances    FAMILY HISTORY:  FH: diabetes mellitus  father and brother      Non-contributary for premature coronary disease or sudden cardiac death    SOCIAL HISTORY:    [x ] Non-smoker  [ ] Smoker  [ ] Alcohol    PHYSICAL EXAM:  T(C): 36.6 (03-04-25 @ 12:00), Max: 36.7 (03-03-25 @ 14:32)  HR: 97 (03-04-25 @ 12:00) (84 - 98)  BP: 130/87 (03-04-25 @ 12:00) (112/86 - 144/85)  RR: 18 (03-04-25 @ 12:00) (15 - 19)  SpO2: 100% (03-04-25 @ 12:00) (96% - 100%)  Wt(kg): --    Appearance: Normal appearing adult male in no acute distress	  HEENT:   Normal oral mucosa, PERRL, EOMI	  Lymphatic: No lymphadenopathy , no edema  Cardiovascular: Normal S1 S2, No JVD, No murmurs , Peripheral pulses palpable 2+ bilaterally  Respiratory: Lungs clear to auscultation, normal effort 	  Gastrointestinal:  Soft, Non-tender, + BS	  Skin: No rashes, No ecchymoses, No cyanosis, warm to touch  Musculoskeletal: Normal range of motion, normal strength  Psychiatry:  Mood & affect appropriate      TELEMETRY: NSR, no PVCs, no arrhythmia.	    inpatient echo pending  	  LABS:	 	                          14.1   4.38  )-----------( 235      ( 04 Mar 2025 09:19 )             40.9     03-04    137  |  103  |  14  ----------------------------<  180[H]  4.4   |  22  |  1.14    Ca    9.5      04 Mar 2025 09:19  Phos  2.2     03-04  Mg     1.90     03-04    TPro  7.1  /  Alb  3.8  /  TBili  <0.2  /  DBili  x   /  AST  15  /  ALT  17  /  AlkPhos  85  03-03      ASSESSMENT/PLAN: Mr Bunch is a pleasant 64y Male here for expedited workup of abnormal office stress test.  New dx of multivessel severe CAD, and is s/p PCI to LAD.  Syncopal history seems to be vasovagal vs autonomic dysfunction (re: diabetes, CAD, MACEY), and less likely to be due to ischemi-driven ventricular arrhythmia.  Coronary revascularization should take highest priority.  Inpatient, maintain cardiac telemetry.  Outpatient, Holter/MCOT for arrhythmia surveillance.  Will follow with you.      Olman Hook M.D.  Cardiac Electrophysiology    office [phone]  pager [phone]
    Patient is a 64y old  Male who presents with a chief complaint of Dizziness & syncope  with abnormal TTE and Stress Test       HPI:  64 year old male with PMH HTN, DM, HLD,BPH,  hx cerical decompression surgery 4/2024, with recurrent syncopal episodes, who presents from the office after and abnormal echo and Nuclear stress test.  He reports MOCK, denies exertional chest pain.  TTE with mild LV systolic dysfunction and NST with anterior and apical ischemia. He is planned for cardiac cath today.       PAST MEDICAL & SURGICAL HISTORY:  Diabetes mellitus  type II, diagnosed in 2018      Hypertension      Snoring  MACEY precautions -- responds affirmatively to STOP BANG questionnaire      Enlarged prostate      H/O phimosis  2019      Arthropathy  right shoulder -- never worked up      H/O cholelithiasis  s/p lap cholecystectomy      Enlarged prostate  negatie biopsy      History of back surgery      S/P left rotator cuff repair      S/P right rotator cuff repair          Social History:    FAMILY HISTORY:  FH: diabetes mellitus  father and brother        Allergies    No Known Allergies    Intolerances        REVIEW OF SYSTEMS:    CONSTITUTIONAL: No fever, weight loss, or fatigue  EYES: No eye pain, visual disturbances, or discharge  RESPIRATORY: No cough, wheezing, chills or hemoptysis; No shortness of breath  CARDIOVASCULAR: No chest pain, palpitations, dizziness, or leg swelling  GASTROINTESTINAL: No abdominal or epigastric pain. No nausea, vomiting, or hematemesis; No diarrhea or constipation. No melena or hematochezia.  GENITOURINARY: No dysuria, frequency, hematuria, or incontinence  NEUROLOGICAL: No headaches, memory loss, loss of strength, numbness, or tremors  SKIN: No itching, burning, rashes, or lesions   ENDOCRINE: No heat or cold intolerance; No hair loss  MUSCULOSKELETAL: No joint pain or swelling; No muscle, back, or extremity pain  PSYCHIATRIC: No depression, anxiety, mood swings, or difficulty sleeping      MEDICATIONS  (STANDING):  amLODIPine   Tablet 5 milliGRAM(s) Oral daily  aspirin enteric coated 81 milliGRAM(s) Oral daily  hydrochlorothiazide 25 milliGRAM(s) Oral daily  rosuvastatin 40 milliGRAM(s) Oral at bedtime  sodium chloride 0.9%. 500 milliLiter(s) (75 mL/Hr) IV Continuous <Continuous>  tamsulosin 0.4 milliGRAM(s) Oral at bedtime    MEDICATIONS  (PRN):      Vital Signs Last 24 Hrs  T(C): 36.6 (03 Mar 2025 20:46), Max: 36.7 (03 Mar 2025 14:32)  T(F): 97.9 (03 Mar 2025 20:46), Max: 98.1 (03 Mar 2025 17:00)  HR: 85 (03 Mar 2025 20:46) (85 - 98)  BP: 140/81 (03 Mar 2025 20:46) (112/86 - 154/100)  BP(mean): --  RR: 18 (03 Mar 2025 20:46) (15 - 19)  SpO2: 100% (03 Mar 2025 20:46) (96% - 100%)    Parameters below as of 03 Mar 2025 20:46  Patient On (Oxygen Delivery Method): room air        PHYSICAL EXAM:    GENERAL: NAD, well-groomed, well-developed  HEAD:  Atraumatic, Normocephalic  EYES: EOMI, PERRLA, conjunctiva and sclera clear  ENMT: No tonsillar erythema, exudates, or enlargement; Moist mucous membranes, Good dentition, No lesions  NECK: Supple, No JVD, Normal thyroid  NERVOUS SYSTEM:  Alert & Oriented X3, No new  focal sign .  CHEST/LUNG: Air entry good bilaterally; No rales, rhonchi, wheezing, or rubs  HEART: Regular rate and rhythm; No murmurs, rubs, or gallops  ABDOMEN: Soft, Nontender, Nondistended; Bowel sounds present  EXTREMITIES:  2+ Peripheral Pulses, No clubbing, cyanosis, or edema    LABS:                        13.6   4.11  )-----------( 230      ( 03 Mar 2025 12:15 )             40.4     03-03    136  |  102  |  18  ----------------------------<  103[H]  4.4   |  23  |  1.13    Ca    9.4      03 Mar 2025 12:15  Mg     1.80     03-03    TPro  7.1  /  Alb  3.8  /  TBili  <0.2  /  DBili  x   /  AST  15  /  ALT  17  /  AlkPhos  85  03-03    PT/INR - ( 03 Mar 2025 12:15 )   PT: 12.6 sec;   INR: 1.09 ratio         PTT - ( 03 Mar 2025 12:15 )  PTT:30.2 sec  Urinalysis Basic - ( 03 Mar 2025 12:15 )    Color: x / Appearance: x / SG: x / pH: x  Gluc: 103 mg/dL / Ketone: x  / Bili: x / Urobili: x   Blood: x / Protein: x / Nitrite: x   Leuk Esterase: x / RBC: x / WBC x   Sq Epi: x / Non Sq Epi: x / Bacteria: x          RADIOLOGY & ADDITIONAL STUDIES:

## 2025-03-05 LAB
ANION GAP SERPL CALC-SCNC: 12 MMOL/L — SIGNIFICANT CHANGE UP (ref 7–14)
BUN SERPL-MCNC: 14 MG/DL — SIGNIFICANT CHANGE UP (ref 7–23)
CALCIUM SERPL-MCNC: 9.5 MG/DL — SIGNIFICANT CHANGE UP (ref 8.4–10.5)
CHLORIDE SERPL-SCNC: 105 MMOL/L — SIGNIFICANT CHANGE UP (ref 98–107)
CO2 SERPL-SCNC: 22 MMOL/L — SIGNIFICANT CHANGE UP (ref 22–31)
CREAT SERPL-MCNC: 1.07 MG/DL — SIGNIFICANT CHANGE UP (ref 0.5–1.3)
EGFR: 78 ML/MIN/1.73M2 — SIGNIFICANT CHANGE UP
EGFR: 78 ML/MIN/1.73M2 — SIGNIFICANT CHANGE UP
GLUCOSE SERPL-MCNC: 117 MG/DL — HIGH (ref 70–99)
HCT VFR BLD CALC: 44.6 % — SIGNIFICANT CHANGE UP (ref 39–50)
HGB BLD-MCNC: 15 G/DL — SIGNIFICANT CHANGE UP (ref 13–17)
MAGNESIUM SERPL-MCNC: 1.9 MG/DL — SIGNIFICANT CHANGE UP (ref 1.6–2.6)
MCHC RBC-ENTMCNC: 29.6 PG — SIGNIFICANT CHANGE UP (ref 27–34)
MCHC RBC-ENTMCNC: 33.6 G/DL — SIGNIFICANT CHANGE UP (ref 32–36)
MCV RBC AUTO: 88.1 FL — SIGNIFICANT CHANGE UP (ref 80–100)
NRBC # BLD AUTO: 0 K/UL — SIGNIFICANT CHANGE UP (ref 0–0)
NRBC # FLD: 0 K/UL — SIGNIFICANT CHANGE UP (ref 0–0)
NRBC BLD AUTO-RTO: 0 /100 WBCS — SIGNIFICANT CHANGE UP (ref 0–0)
PHOSPHATE SERPL-MCNC: 2.4 MG/DL — LOW (ref 2.5–4.5)
PLATELET # BLD AUTO: 238 K/UL — SIGNIFICANT CHANGE UP (ref 150–400)
POTASSIUM SERPL-MCNC: 4.2 MMOL/L — SIGNIFICANT CHANGE UP (ref 3.5–5.3)
POTASSIUM SERPL-SCNC: 4.2 MMOL/L — SIGNIFICANT CHANGE UP (ref 3.5–5.3)
RBC # BLD: 5.06 M/UL — SIGNIFICANT CHANGE UP (ref 4.2–5.8)
RBC # FLD: 13.1 % — SIGNIFICANT CHANGE UP (ref 10.3–14.5)
SODIUM SERPL-SCNC: 139 MMOL/L — SIGNIFICANT CHANGE UP (ref 135–145)
WBC # BLD: 3.77 K/UL — LOW (ref 3.8–10.5)
WBC # FLD AUTO: 3.77 K/UL — LOW (ref 3.8–10.5)

## 2025-03-05 PROCEDURE — 93010 ELECTROCARDIOGRAM REPORT: CPT

## 2025-03-05 PROCEDURE — 70496 CT ANGIOGRAPHY HEAD: CPT | Mod: 26

## 2025-03-05 PROCEDURE — 70498 CT ANGIOGRAPHY NECK: CPT | Mod: 26

## 2025-03-05 PROCEDURE — 95816 EEG AWAKE AND DROWSY: CPT | Mod: 26

## 2025-03-05 RX ORDER — METOPROLOL SUCCINATE 50 MG/1
50 TABLET, EXTENDED RELEASE ORAL DAILY
Refills: 0 | Status: DISCONTINUED | OUTPATIENT
Start: 2025-03-06 | End: 2025-03-06

## 2025-03-05 RX ORDER — DIPHENHYDRAMINE HCL 12.5MG/5ML
25 ELIXIR ORAL ONCE
Refills: 0 | Status: COMPLETED | OUTPATIENT
Start: 2025-03-05 | End: 2025-03-05

## 2025-03-05 RX ORDER — OXYCODONE HYDROCHLORIDE 30 MG/1
15 TABLET ORAL ONCE
Refills: 0 | Status: DISCONTINUED | OUTPATIENT
Start: 2025-03-05 | End: 2025-03-05

## 2025-03-05 RX ORDER — LOSARTAN POTASSIUM 100 MG/1
25 TABLET, FILM COATED ORAL DAILY
Refills: 0 | Status: DISCONTINUED | OUTPATIENT
Start: 2025-03-06 | End: 2025-03-06

## 2025-03-05 RX ORDER — TAMSULOSIN HYDROCHLORIDE 0.4 MG/1
0.4 CAPSULE ORAL
Refills: 0 | Status: DISCONTINUED | OUTPATIENT
Start: 2025-03-05 | End: 2025-03-06

## 2025-03-05 RX ORDER — TAMSULOSIN HYDROCHLORIDE 0.4 MG/1
0.8 CAPSULE ORAL
Refills: 0 | Status: DISCONTINUED | OUTPATIENT
Start: 2025-03-05 | End: 2025-03-05

## 2025-03-05 RX ADMIN — CARVEDILOL 12.5 MILLIGRAM(S): 3.12 TABLET, FILM COATED ORAL at 05:05

## 2025-03-05 RX ADMIN — TICAGRELOR 90 MILLIGRAM(S): 90 TABLET ORAL at 05:05

## 2025-03-05 RX ADMIN — NALOXEGOL OXALATE 25 MILLIGRAM(S): 12.5 TABLET, FILM COATED ORAL at 13:36

## 2025-03-05 RX ADMIN — Medication 81 MILLIGRAM(S): at 08:55

## 2025-03-05 RX ADMIN — Medication 60 MILLIGRAM(S): at 05:05

## 2025-03-05 RX ADMIN — OXYCODONE HYDROCHLORIDE 15 MILLIGRAM(S): 30 TABLET ORAL at 17:56

## 2025-03-05 RX ADMIN — ROSUVASTATIN CALCIUM 40 MILLIGRAM(S): 20 TABLET, FILM COATED ORAL at 21:00

## 2025-03-05 RX ADMIN — GABAPENTIN 300 MILLIGRAM(S): 400 CAPSULE ORAL at 21:00

## 2025-03-05 RX ADMIN — TICAGRELOR 90 MILLIGRAM(S): 90 TABLET ORAL at 17:09

## 2025-03-05 RX ADMIN — OXYCODONE HYDROCHLORIDE 15 MILLIGRAM(S): 30 TABLET ORAL at 18:54

## 2025-03-05 RX ADMIN — TAMSULOSIN HYDROCHLORIDE 0.4 MILLIGRAM(S): 0.4 CAPSULE ORAL at 17:10

## 2025-03-05 RX ADMIN — Medication 25 MILLIGRAM(S): at 22:40

## 2025-03-05 NOTE — PROGRESS NOTE ADULT - SUBJECTIVE AND OBJECTIVE BOX
EP Attending  HISTORY OF PRESENT ILLNESS: HPI:  64 year old male with PMH HTN, DM, HLD, hx cerical decompression surgery 4/2024, with recurrent syncopal episodes, who presents from the office after and abnormal echo and Nuclear stress test.  He reports MOCK, denies exertional chest pain.  TTE with mild LV systolic dysfunction and NST with anterior and apical ischemia.    No prior hx CAD, MI, CHF, No prior cardiac work up.  Has not yet been evaluated by a Neurologist for recurrent syncope. (03 Mar 2025 13:26)    Newly identified/ previously undiagnosed severe multivesell CAD.  Remarkably, has undergone multiple prior surgeries without incident.  No palpitations, and not experiencing angina, albeit at low cardiac workload at home.  Syncopal episodes tend to occur after prolonged standing, no significant prodrome.  A 10 pt ROS is otherwise negative.  He is s/p LAD PCI today.  Date of service 3/5- resting in bed, no new issues.    PAST MEDICAL & SURGICAL HISTORY:  Diabetes mellitus  type II, diagnosed in 2018  Hypertension  Snoring  MACEY precautions -- responds affirmatively to STOP BANG questionnaire  Enlarged prostate  H/O phimosis  2019  Arthropathy  right shoulder -- never worked up  H/O cholelithiasis  s/p lap cholecystectomy  Enlarged prostate  negatie biopsy  History of back surgery  S/P left rotator cuff repair  S/P right rotator cuff repair    aspirin enteric coated 81 milliGRAM(s) Oral daily  carvedilol 12.5 milliGRAM(s) Oral every 12 hours  gabapentin 300 milliGRAM(s) Oral <User Schedule>  influenza   Vaccine 0.5 milliLiter(s) IntraMuscular once  naloxegol 25 milliGRAM(s) Oral <User Schedule>  NIFEdipine XL 60 milliGRAM(s) Oral daily  rosuvastatin 40 milliGRAM(s) Oral at bedtime  sodium chloride 0.9%. 500 milliLiter(s) IV Continuous <Continuous>  tamsulosin 0.4 milliGRAM(s) Oral at bedtime  ticagrelor 90 milliGRAM(s) Oral every 12 hours                          15.0   3.77  )-----------( 238      ( 05 Mar 2025 07:43 )             44.6     03-05    139  |  105  |  14  ----------------------------<  117[H]  4.2   |  22  |  1.07    Ca    9.5      05 Mar 2025 07:43  Phos  2.4     03-05  Mg     1.90     03-05    TPro  7.1  /  Alb  3.8  /  TBili  <0.2  /  DBili  x   /  AST  15  /  ALT  17  /  AlkPhos  85  03-03    T(C): 36.8 (03-05-25 @ 08:51), Max: 36.8 (03-05-25 @ 05:00)  HR: 88 (03-05-25 @ 08:51) (80 - 97)  BP: 125/97 (03-05-25 @ 08:51) (119/87 - 146/95)  RR: 16 (03-05-25 @ 08:51) (16 - 18)  SpO2: 97% (03-05-25 @ 08:51) (97% - 100%)  Wt(kg): --    I&O's Summary    Appearance: Normal appearing adult male in no acute distress	  HEENT:   Normal oral mucosa, PERRL, EOMI	  Lymphatic: No lymphadenopathy , no edema  Cardiovascular: Normal S1 S2, No JVD, No murmurs , Peripheral pulses palpable 2+ bilaterally  Respiratory: Lungs clear to auscultation, normal effort 	  Gastrointestinal:  Soft, Non-tender, + BS	  Skin: No rashes, No ecchymoses, No cyanosis, warm to touch  Musculoskeletal: Normal range of motion, normal strength  Psychiatry:  Mood & affect appropriate    TELEMETRY: NSR, no PVCs, no arrhythmia.	    inpatient echo pending    ASSESSMENT/PLAN: Mr Bunch is a pleasant 64y Male here for expedited workup of abnormal office stress test.  New dx of multivessel severe CAD, and is s/p PCI to LAD.  Syncopal history seems to be vasovagal vs autonomic dysfunction (re: diabetes, CAD, MACEY, vs feeling poorly while a new start on Ozempic), and less likely to be due to ischemi-driven ventricular arrhythmia.  Coronary revascularization should take highest priority.  Inpatient, maintain cardiac telemetry.  Outpatient, Holter/MCOT for arrhythmia surveillance.  Will follow with you.      Olman Hook M.D.  Cardiac Electrophysiology    office [phone]  pager [phone]

## 2025-03-05 NOTE — PROGRESS NOTE ADULT - SUBJECTIVE AND OBJECTIVE BOX
Neurology Progress Note    S: Patient seen and examined.     Medication:  aspirin enteric coated 81 milliGRAM(s) Oral daily  gabapentin 300 milliGRAM(s) Oral <User Schedule>  influenza   Vaccine 0.5 milliLiter(s) IntraMuscular once  rosuvastatin 40 milliGRAM(s) Oral at bedtime  sodium chloride 0.9%. 500 milliLiter(s) IV Continuous <Continuous>  tamsulosin 0.4 milliGRAM(s) Oral two times a day  ticagrelor 90 milliGRAM(s) Oral every 12 hours      Vitals:  Vital Signs Last 24 Hrs  T(C): 36.6 (05 Mar 2025 20:28), Max: 36.8 (05 Mar 2025 05:00)  T(F): 97.9 (05 Mar 2025 20:28), Max: 98.2 (05 Mar 2025 05:00)  HR: 100 (05 Mar 2025 20:28) (70 - 100)  BP: 97/60 (05 Mar 2025 20:28) (97/60 - 142/85)  BP(mean): --  RR: 18 (05 Mar 2025 20:28) (12 - 18)  SpO2: 98% (05 Mar 2025 20:28) (95% - 99%)    Parameters below as of 05 Mar 2025 20:28  Patient On (Oxygen Delivery Method): room air        General Exam:   General Appearance: Appropriately dressed and in no acute distress       Head: Normocephalic, atraumatic and no dysmorphic features  Ear, Nose, and Throat: Moist mucous membranes  CVS: S1S2+  Resp: No SOB, no wheeze or rhonchi  Abd: soft NTND  Extremities: No edema, no cyanosis  Skin: No bruises, no rashes     Neurological Exam:  Mental Status: Awake, alert and oriented x 3.  Able to follow simple and complex verbal commands. Able to name and repeat. fluent speech. No obvious aphasia or dysarthria noted.   Cranial Nerves: PERRL, EOMI, VFFC, sensation V1-V3 intact,  no obvious facial asymmetry, equal elevation of palate, scm/trap 5/5, tongue is midline on protrusion. hearing is grossly intact.   Motor: Normal bulk, tone and strength throughout. Fine finger movements were intact and symmetric. no tremors or drift noted.    Sensation: Intact to light touch and pinprick throughout. no right/left confusion. no extinction to tactile on DSS.   Reflexes: 1+ throughout at biceps, brachioradialis, triceps, patellars and ankles bilaterally and equal. No clonus. R toe and L toe were both downgoing.  Coordination: No dysmetria on FNF but some trouble with L due to previous surgeries  Gait: Unsteady, + Romberg    I personally reviewed the below data/images/labs:      CBC Full  -  ( 05 Mar 2025 07:43 )  WBC Count : 3.77 K/uL  RBC Count : 5.06 M/uL  Hemoglobin : 15.0 g/dL  Hematocrit : 44.6 %  Platelet Count - Automated : 238 K/uL  Mean Cell Volume : 88.1 fL  Mean Cell Hemoglobin : 29.6 pg  Mean Cell Hemoglobin Concentration : 33.6 g/dL  Auto Neutrophil # : x  Auto Lymphocyte # : x  Auto Monocyte # : x  Auto Eosinophil # : x  Auto Basophil # : x  Auto Neutrophil % : x  Auto Lymphocyte % : x  Auto Monocyte % : x  Auto Eosinophil % : x  Auto Basophil % : x    03-05    139  |  105  |  14  ----------------------------<  117[H]  4.2   |  22  |  1.07    Ca    9.5      05 Mar 2025 07:43  Phos  2.4     03-05  Mg     1.90     03-05          Urinalysis Basic - ( 05 Mar 2025 07:43 )    Color: x / Appearance: x / SG: x / pH: x  Gluc: 117 mg/dL / Ketone: x  / Bili: x / Urobili: x   Blood: x / Protein: x / Nitrite: x   Leuk Esterase: x / RBC: x / WBC x   Sq Epi: x / Non Sq Epi: x / Bacteria: x        c< from: TTE W or WO Ultrasound Enhancing Agent (03.04.25 @ 15:25) >  CONCLUSIONS:      1. The left ventricular cavity is normal in size. Left ventricular wall thickness is normal. Left ventricular systolic function is mildly decreased with a calculated ejection fraction of 52 % by the Malik's biplane method of disks. There are regional wall motion abnormalities present. Hypokinesis of the distal septum and anteroapex. There is mild (grade 1) left ventricular diastolic dysfunction.   2. Normal right ventricular cavity size and normal right ventricular systolic function. Tricuspid annular plane systolic excursion (TAPSE) is 1.7 cm (normal >=1.7 cm).   3. Structurally normal mitral valve with normal leaflet excursion. There is calcification of the mitral valve annulus. There is trace mitral regurgitation.    < end of copied text >

## 2025-03-05 NOTE — EEG REPORT - NS EEG TEXT BOX
IVY GARCIA [id number]     Study Date: 03-05-25  Duration: 22 min  --------------------------------------------------------------------------------------------------  History:  CC/ HPI Patient is a 64y old  Male who presents with a chief complaint of Chest Pain (05 Mar 2025 10:11)    MEDICATIONS  (STANDING):  aspirin enteric coated 81 milliGRAM(s) Oral daily  gabapentin 300 milliGRAM(s) Oral <User Schedule>  influenza   Vaccine 0.5 milliLiter(s) IntraMuscular once  rosuvastatin 40 milliGRAM(s) Oral at bedtime  sodium chloride 0.9%. 500 milliLiter(s) (75 mL/Hr) IV Continuous <Continuous>  tamsulosin 0.4 milliGRAM(s) Oral two times a day  ticagrelor 90 milliGRAM(s) Oral every 12 hours    --------------------------------------------------------------------------------------------------  Study Interpretation:    [[[Abbreviation Key:  PDR=alpha rhythm/posterior dominant rhythm. A-P=anterior posterior.  Amplitude: ‘very low’:<20; ‘low’:20-49; ‘medium’:; ‘high’:>150uV.  Persistence for periodic/rhythmic patterns (% of epoch) ‘rare’:<1%; ‘occasional’:1-10%; ‘frequent’:10-50%; ‘abundant’:50-90%; ‘continuous’:>90%.  Persistence for sporadic discharges: ‘rare’:<1/hr; ‘occasional’:1/min-1/hr; ‘frequent’:>1/min; ‘abundant’:>1/10 sec.  RPP=rhythmic and periodic patterns; GRDA=generalized rhythmic delta activity; FIRDA=frontal intermittent GRDA; LRDA=lateralized rhythmic delta activity; TIRDA=temporal intermittent rhythmic delta activity;  LPD=PLED=lateralized periodic discharges; GPD=generalized periodic discharges; BIPDs =bilateral independent periodic discharges; Mf=multifocal; SIRPDs=stimulus induced rhythmic, periodic, or ictal appearing discharges; BIRDs=brief potentially ictal rhythmic discharges >4 Hz, lasting .5-10s; PFA (paroxysmal bursts >13 Hz or =8 Hz <10s).  Modifiers: +F=with fast component; +S=with spike component; +R=with rhythmic component.  S-B=burst suppression pattern.  Max=maximal. N1-drowsy; N2-stage II sleep; N3-slow wave sleep. SSS/BETS=small sharp spikes/benign epileptiform transients of sleep. HV=hyperventilation; PS=photic stimulation]]]    Daily EEG Visual Analysis    FINDINGS:      Background:  Continuity: Continuous  Symmetry: Symmetric  Posterior dominant rhythm (PDR): 9.5-10 Hz, reactive to eye closure. Symmetric low-amplitude frontal beta in wakefulness.  Voltage: Normal  Anterior-Posterior Gradient: Present  Other background findings: None  Breach: Absent    Background Slowing:  Generalized slowing: None  Focal slowing: None    State Changes:   Drowsiness and stage 2 sleep are not captured.    Interictal Findings:  None    Electrographic and Electroclinical seizures:  None    Other Clinical Events:  None    Activation Procedures:   Hyperventilation is not performed.    Photic stimulation is performed and does not elicit any abnormalities.      Artifacts:  Intermittent myogenic and movement artifacts are present.    Single-lead EKG: Regular rhythm    EEG Classification / Summary:  Normal routine EEG in the awake state.  No focal or epileptiform abnormalities are captured.   No seizures.    Clinical Impression:  A normal routine EEG neither refutes nor supports a diagnosis of epilepsy.   No epileptiform abnormalities or seizures.          -------------------------------------------------------------------------------------------------------    Sofia Jordan MD  Attending Physician, [facility]    -------------------------------------------------------------------------------------------------------    To reach EEG technologist:  Please use the pager number for the appropriate hospital or contact the .  At [facility] - Pager #: [phone]    To reach EEG-reading physician:  EEG Reading Room Phone #: [phone]  Epilepsy Answering Service after 5PM and before 8:30AM: Phone #: [phone]

## 2025-03-05 NOTE — PROGRESS NOTE ADULT - SUBJECTIVE AND OBJECTIVE BOX
DATE OF SERVICE: 03-05-25    Patient denies chest pain or shortness of breath. For staged PCI to LCX today.  Review of symptoms otherwise negative.    MEDICATIONS:  aspirin enteric coated 81 milliGRAM(s) Oral daily  carvedilol 12.5 milliGRAM(s) Oral every 12 hours  gabapentin 300 milliGRAM(s) Oral <User Schedule>  influenza   Vaccine 0.5 milliLiter(s) IntraMuscular once  naloxegol 25 milliGRAM(s) Oral <User Schedule>  NIFEdipine XL 60 milliGRAM(s) Oral daily  rosuvastatin 40 milliGRAM(s) Oral at bedtime  sodium chloride 0.9%. 500 milliLiter(s) IV Continuous <Continuous>  tamsulosin 0.4 milliGRAM(s) Oral at bedtime  ticagrelor 90 milliGRAM(s) Oral every 12 hours      LABS:                        15.0   3.77  )-----------( 238      ( 05 Mar 2025 07:43 )             44.6       Hemoglobin: 15.0 g/dL (03-05 @ 07:43)  Hemoglobin: 14.1 g/dL (03-04 @ 09:19)  Hemoglobin: 13.6 g/dL (03-03 @ 12:15)      03-05    139  |  105  |  14  ----------------------------<  117[H]  4.2   |  22  |  1.07    Ca    9.5      05 Mar 2025 07:43  Phos  2.4     03-05  Mg     1.90     03-05    TPro  7.1  /  Alb  3.8  /  TBili  <0.2  /  DBili  x   /  AST  15  /  ALT  17  /  AlkPhos  85  03-03    Creatinine Trend: 1.07<--, 1.14<--, 1.13<--    COAGS:           PHYSICAL EXAM:  T(C): 36.8 (03-05-25 @ 08:51), Max: 36.8 (03-05-25 @ 05:00)  HR: 88 (03-05-25 @ 08:51) (80 - 97)  BP: 125/97 (03-05-25 @ 08:51) (119/87 - 146/95)  RR: 16 (03-05-25 @ 08:51) (16 - 18)  SpO2: 97% (03-05-25 @ 08:51) (97% - 100%)  Wt(kg): --    I&O's Summary      General:  Alert and Oriented * 3.   Head: Normocephalic and atraumatic.   Neck: No JVD. No bruits. Supple. Does not appear to be enlarged.   Cardiovascular: + S1,S2 ; RRR Soft systolic murmur at the left lower sternal border. No rubs noted.    Lungs: CTA b/l. No rhonchi, rales or wheezes.   Abdomen: + BS, soft. Non tender. Non distended. No rebound. No guarding.   Extremities: No clubbing/cyanosis/edema.   Neurologic: Moves all four extremities. Full range of motion.   Skin: Warm and moist. The patient's skin has normal elasticity and good skin turgor.   Psychiatric: Appropriate mood and affect.  Musculoskeletal: Normal range of motion, normal strength       TELE: NSR      < from: Cardiac Catheterization (03.03.25 @ 15:18) >  Conclusions:   Subtotaled LAD lesion s/p 2 JEF from proximal to mid LAD     Severe mid LCX lesion and small caliber OM1 with severe disease   distal rPDA lesion that is 90%   Recommendations:     c/w ASA and Brilinta for 1 year   Increase crestor to 40 mg   Plan to manage rPDA lesion and OM  lesion medically   Staged PCI to LCX     < end of copied text >      < from: TTE W or WO Ultrasound Enhancing Agent (03.04.25 @ 15:25) >     CONCLUSIONS:      1. The left ventricular cavity is normal in size. Left ventricular wall thickness is normal. Left ventricular systolic function is mildly decreased with a calculated ejection fraction of 52 % by the Malik's biplane method of disks. There are regional wall motion abnormalities present. Hypokinesis of the distal septum and anteroapex. There is mild (grade 1) left ventricular diastolic dysfunction.   2. Normal right ventricular cavity size and normal right ventricular systolic function. Tricuspid annular plane systolic excursion (TAPSE) is 1.7 cm (normal >=1.7 cm).   3. Structurally normal mitral valve with normal leaflet excursion. There is calcification of the mitral valve annulus. There is trace mitral regurgitation.    < end of copied text >        64 year old male with PMH HTN, DM, HLD, hx cerical decompression surgery 4/2024, with recurrent syncopal episodes, who presents from the office after and abnormal echo and Nuclear stress test.  He reports MOCK, denies exertional chest pain.  TTE with mild LV systolic dysfunction and NST with anterior and apical ischemia    - Cath, 99% prox LAD, 80% mid LCX, 99% small vessel OM1, RCA with mild disease and rPDA with 80% lesion  - s/p Stentx2 from prox to mid LAD, has 80% mid LCX lesion for staged PCI today, All risks, benefits, indications, contraindications, inferior alternative options of cardiac cath explained to the patient. The patient understood everything and elects for cardiac cath.  and has 80% distal rPDA lesion that will be managed medically  - c/w ASA and Brilinta  - c/w coreg 12.5 mg BID  - c/wnifedipine 60 mg daily  - c/w crestor  - f/u neuro recs    Jose Manuel Diaz MD  Pager: [phone]  DATE OF SERVICE: 03-05-25    Patient denies chest pain or shortness of breath. For staged PCI to LCX today.  Review of symptoms otherwise negative.    MEDICATIONS:  aspirin enteric coated 81 milliGRAM(s) Oral daily  carvedilol 12.5 milliGRAM(s) Oral every 12 hours  gabapentin 300 milliGRAM(s) Oral <User Schedule>  influenza   Vaccine 0.5 milliLiter(s) IntraMuscular once  naloxegol 25 milliGRAM(s) Oral <User Schedule>  NIFEdipine XL 60 milliGRAM(s) Oral daily  rosuvastatin 40 milliGRAM(s) Oral at bedtime  sodium chloride 0.9%. 500 milliLiter(s) IV Continuous <Continuous>  tamsulosin 0.4 milliGRAM(s) Oral at bedtime  ticagrelor 90 milliGRAM(s) Oral every 12 hours      LABS:                        15.0   3.77  )-----------( 238      ( 05 Mar 2025 07:43 )             44.6       Hemoglobin: 15.0 g/dL (03-05 @ 07:43)  Hemoglobin: 14.1 g/dL (03-04 @ 09:19)  Hemoglobin: 13.6 g/dL (03-03 @ 12:15)      03-05    139  |  105  |  14  ----------------------------<  117[H]  4.2   |  22  |  1.07    Ca    9.5      05 Mar 2025 07:43  Phos  2.4     03-05  Mg     1.90     03-05    TPro  7.1  /  Alb  3.8  /  TBili  <0.2  /  DBili  x   /  AST  15  /  ALT  17  /  AlkPhos  85  03-03    Creatinine Trend: 1.07<--, 1.14<--, 1.13<--    COAGS:           PHYSICAL EXAM:  T(C): 36.8 (03-05-25 @ 08:51), Max: 36.8 (03-05-25 @ 05:00)  HR: 88 (03-05-25 @ 08:51) (80 - 97)  BP: 125/97 (03-05-25 @ 08:51) (119/87 - 146/95)  RR: 16 (03-05-25 @ 08:51) (16 - 18)  SpO2: 97% (03-05-25 @ 08:51) (97% - 100%)  Wt(kg): --    I&O's Summary      General:  Alert and Oriented * 3.   Head: Normocephalic and atraumatic.   Neck: No JVD. No bruits. Supple. Does not appear to be enlarged.   Cardiovascular: + S1,S2 ; RRR Soft systolic murmur at the left lower sternal border. No rubs noted.    Lungs: CTA b/l. No rhonchi, rales or wheezes.   Abdomen: + BS, soft. Non tender. Non distended. No rebound. No guarding.   Extremities: No clubbing/cyanosis/edema.   Neurologic: Moves all four extremities. Full range of motion.   Skin: Warm and moist. The patient's skin has normal elasticity and good skin turgor.   Psychiatric: Appropriate mood and affect.  Musculoskeletal: Normal range of motion, normal strength       TELE: NSR      < from: Cardiac Catheterization (03.03.25 @ 15:18) >  Conclusions:   Subtotaled LAD lesion s/p 2 JEF from proximal to mid LAD     Severe mid LCX lesion and small caliber OM1 with severe disease   distal rPDA lesion that is 90%   Recommendations:     c/w ASA and Brilinta for 1 year   Increase crestor to 40 mg   Plan to manage rPDA lesion and OM  lesion medically   Staged PCI to LCX     < end of copied text >      < from: TTE W or WO Ultrasound Enhancing Agent (03.04.25 @ 15:25) >     CONCLUSIONS:      1. The left ventricular cavity is normal in size. Left ventricular wall thickness is normal. Left ventricular systolic function is mildly decreased with a calculated ejection fraction of 52 % by the Malik's biplane method of disks. There are regional wall motion abnormalities present. Hypokinesis of the distal septum and anteroapex. There is mild (grade 1) left ventricular diastolic dysfunction.   2. Normal right ventricular cavity size and normal right ventricular systolic function. Tricuspid annular plane systolic excursion (TAPSE) is 1.7 cm (normal >=1.7 cm).   3. Structurally normal mitral valve with normal leaflet excursion. There is calcification of the mitral valve annulus. There is trace mitral regurgitation.    < end of copied text >        64 year old male with PMH HTN, DM, HLD, hx cerical decompression surgery 4/2024, with recurrent syncopal episodes, who presents from the office after and abnormal echo and Nuclear stress test.  He reports MOCK, denies exertional chest pain.  TTE with mild LV systolic dysfunction and NST with anterior and apical ischemia    - Cath, 99% prox LAD, 80% mid LCX, 99% small vessel OM1, RCA with mild disease and rPDA with 80% lesion  - s/p Stentx2 from prox to mid LAD, has 80% mid LCX lesion for staged PCI today, All risks, benefits, indications, contraindications, inferior alternative options of cardiac cath explained to the patient. The patient understood everything and elects for cardiac cath.  and has 80% distal rPDA lesion that will be managed medically  - c/w ASA and Brilinta  - change coreg 12.5 mg BID to toprol 50 mg  - change nifedipine to losartan in setting of DM  - c/w crestor  - f/u neuro recs    Jose Manuel Diaz MD  Pager: [phone]

## 2025-03-05 NOTE — PRE PROCEDURE NOTE - PRE PROCEDURE EVALUATION
Pre Procedural Sedation Evaluation    Proceduralist: Dr. Diaz    History and physical reviewed  Medications reviewed  Labs reviewed  EKG reviewed    Anticoagulation: None  Urine pregnancy: N/A  Dentures: None  Last PO intake: NPO p MN on 3/4    Pre-Procedure Physical Assessment  Mental status: Alert and oriented x 3  Level of consciousness: 1  Cardiac assessment: Stable  Pulmonary assessment: Stable  Obstructive sleep apnea: No  Aspiration risk: No  Mallampati score: 2  ASA Classification: 3  Prior Sedative or Anesthesia Experience: No complications  Informed consent by responsible adult: Yes  Based on today's assessment, anesthesia consult requested: No

## 2025-03-05 NOTE — PROGRESS NOTE ADULT - SUBJECTIVE AND OBJECTIVE BOX
Date of Service  : 03-05-25     INTERVAL HPI/OVERNIGHT EVENTS: S/P Staged PCI   Vital Signs Last 24 Hrs  T(C): 36.7 (05 Mar 2025 16:00), Max: 36.8 (05 Mar 2025 05:00)  T(F): 98.1 (05 Mar 2025 16:00), Max: 98.2 (05 Mar 2025 05:00)  HR: 90 (05 Mar 2025 16:00) (70 - 96)  BP: 130/88 (05 Mar 2025 16:00) (108/81 - 142/85)  BP(mean): --  RR: 18 (05 Mar 2025 16:00) (12 - 18)  SpO2: 99% (05 Mar 2025 16:00) (95% - 99%)    Parameters below as of 05 Mar 2025 16:00  Patient On (Oxygen Delivery Method): room air      I&O's Summary    MEDICATIONS  (STANDING):  aspirin enteric coated 81 milliGRAM(s) Oral daily  gabapentin 300 milliGRAM(s) Oral <User Schedule>  influenza   Vaccine 0.5 milliLiter(s) IntraMuscular once  rosuvastatin 40 milliGRAM(s) Oral at bedtime  sodium chloride 0.9%. 500 milliLiter(s) (75 mL/Hr) IV Continuous <Continuous>  tamsulosin 0.4 milliGRAM(s) Oral two times a day  ticagrelor 90 milliGRAM(s) Oral every 12 hours    MEDICATIONS  (PRN):    LABS:                        15.0   3.77  )-----------( 238      ( 05 Mar 2025 07:43 )             44.6     03-05    139  |  105  |  14  ----------------------------<  117[H]  4.2   |  22  |  1.07    Ca    9.5      05 Mar 2025 07:43  Phos  2.4     03-05  Mg     1.90     03-05        Urinalysis Basic - ( 05 Mar 2025 07:43 )    Color: x / Appearance: x / SG: x / pH: x  Gluc: 117 mg/dL / Ketone: x  / Bili: x / Urobili: x   Blood: x / Protein: x / Nitrite: x   Leuk Esterase: x / RBC: x / WBC x   Sq Epi: x / Non Sq Epi: x / Bacteria: x      CAPILLARY BLOOD GLUCOSE            Urinalysis Basic - ( 05 Mar 2025 07:43 )    Color: x / Appearance: x / SG: x / pH: x  Gluc: 117 mg/dL / Ketone: x  / Bili: x / Urobili: x   Blood: x / Protein: x / Nitrite: x   Leuk Esterase: x / RBC: x / WBC x   Sq Epi: x / Non Sq Epi: x / Bacteria: x      REVIEW OF SYSTEMS:  CONSTITUTIONAL: No fever, weight loss, or fatigue  EYES: No eye pain, visual disturbances, or discharge  ENMT:  No difficulty hearing, tinnitus, vertigo; No sinus or throat pain  NECK: No pain or stiffness  RESPIRATORY: No cough, wheezing, chills or hemoptysis; No shortness of breath  CARDIOVASCULAR: No chest pain, palpitations, dizziness, or leg swelling  GASTROINTESTINAL: No abdominal or epigastric pain. No nausea, vomiting, or hematemesis; No diarrhea or constipation. No melena or hematochezia.  GENITOURINARY: No dysuria, frequency, hematuria, or incontinence  NEUROLOGICAL: No headaches, memory loss, loss of strength, numbness, or tremors  SKIN: No itching, burning, rashes, or lesions     Consultant(s) Notes Reviewed:  [x ] YES  [ ] NO    PHYSICAL EXAM:  GENERAL: NAD, well-groomed, well-developed,not in any distress ,  HEAD:  Atraumatic, Normocephalic  EYES: EOMI, PERRLA, conjunctiva and sclera clear  ENMT: No tonsillar erythema, exudates, or enlargement; Moist mucous membranes, Good dentition, No lesions  NECK: Supple, No JVD, Normal thyroid  NERVOUS SYSTEM:  Alert & Oriented X3, No focal deficit   CHEST/LUNG: Good air entry bilateral with no  rales, rhonchi, wheezing, or rubs  HEART: Regular rate and rhythm; No murmurs, rubs, or gallops  ABDOMEN: Soft, Nontender, Nondistended; Bowel sounds present  EXTREMITIES:  2+ Peripheral Pulses, No clubbing, cyanosis, or edema    Care Discussed with Consultants/Other Providers [ x] YES  [ ] NO

## 2025-03-06 ENCOUNTER — TRANSCRIPTION ENCOUNTER (OUTPATIENT)
Age: 65
End: 2025-03-06

## 2025-03-06 VITALS
OXYGEN SATURATION: 100 % | SYSTOLIC BLOOD PRESSURE: 130 MMHG | HEART RATE: 84 BPM | DIASTOLIC BLOOD PRESSURE: 91 MMHG | RESPIRATION RATE: 18 BRPM | TEMPERATURE: 97 F

## 2025-03-06 LAB
ANION GAP SERPL CALC-SCNC: 11 MMOL/L — SIGNIFICANT CHANGE UP (ref 7–14)
BUN SERPL-MCNC: 16 MG/DL — SIGNIFICANT CHANGE UP (ref 7–23)
CALCIUM SERPL-MCNC: 8.9 MG/DL — SIGNIFICANT CHANGE UP (ref 8.4–10.5)
CHLORIDE SERPL-SCNC: 105 MMOL/L — SIGNIFICANT CHANGE UP (ref 98–107)
CO2 SERPL-SCNC: 20 MMOL/L — LOW (ref 22–31)
CREAT SERPL-MCNC: 1.2 MG/DL — SIGNIFICANT CHANGE UP (ref 0.5–1.3)
EGFR: 68 ML/MIN/1.73M2 — SIGNIFICANT CHANGE UP
EGFR: 68 ML/MIN/1.73M2 — SIGNIFICANT CHANGE UP
GLUCOSE SERPL-MCNC: 115 MG/DL — HIGH (ref 70–99)
HCT VFR BLD CALC: 39.3 % — SIGNIFICANT CHANGE UP (ref 39–50)
HGB BLD-MCNC: 13.3 G/DL — SIGNIFICANT CHANGE UP (ref 13–17)
MAGNESIUM SERPL-MCNC: 1.9 MG/DL — SIGNIFICANT CHANGE UP (ref 1.6–2.6)
MCHC RBC-ENTMCNC: 29.8 PG — SIGNIFICANT CHANGE UP (ref 27–34)
MCHC RBC-ENTMCNC: 33.8 G/DL — SIGNIFICANT CHANGE UP (ref 32–36)
MCV RBC AUTO: 88.1 FL — SIGNIFICANT CHANGE UP (ref 80–100)
NRBC # BLD AUTO: 0 K/UL — SIGNIFICANT CHANGE UP (ref 0–0)
NRBC # FLD: 0 K/UL — SIGNIFICANT CHANGE UP (ref 0–0)
NRBC BLD AUTO-RTO: 0 /100 WBCS — SIGNIFICANT CHANGE UP (ref 0–0)
PHOSPHATE SERPL-MCNC: 2.5 MG/DL — SIGNIFICANT CHANGE UP (ref 2.5–4.5)
PLATELET # BLD AUTO: 216 K/UL — SIGNIFICANT CHANGE UP (ref 150–400)
POTASSIUM SERPL-MCNC: 4 MMOL/L — SIGNIFICANT CHANGE UP (ref 3.5–5.3)
POTASSIUM SERPL-SCNC: 4 MMOL/L — SIGNIFICANT CHANGE UP (ref 3.5–5.3)
RBC # BLD: 4.46 M/UL — SIGNIFICANT CHANGE UP (ref 4.2–5.8)
RBC # FLD: 13.3 % — SIGNIFICANT CHANGE UP (ref 10.3–14.5)
SODIUM SERPL-SCNC: 136 MMOL/L — SIGNIFICANT CHANGE UP (ref 135–145)
WBC # BLD: 4.12 K/UL — SIGNIFICANT CHANGE UP (ref 3.8–10.5)
WBC # FLD AUTO: 4.12 K/UL — SIGNIFICANT CHANGE UP (ref 3.8–10.5)

## 2025-03-06 RX ORDER — ROSUVASTATIN CALCIUM 20 MG/1
1 TABLET, FILM COATED ORAL
Qty: 30 | Refills: 0
Start: 2025-03-06 | End: 2025-04-04

## 2025-03-06 RX ORDER — DAPAGLIFLOZIN 5 MG/1
1 TABLET, FILM COATED ORAL
Qty: 30 | Refills: 0
Start: 2025-03-06 | End: 2025-04-04

## 2025-03-06 RX ORDER — METOPROLOL SUCCINATE 50 MG/1
1 TABLET, EXTENDED RELEASE ORAL
Qty: 30 | Refills: 0
Start: 2025-03-06 | End: 2025-04-04

## 2025-03-06 RX ORDER — ASPIRIN 325 MG
1 TABLET ORAL
Qty: 30 | Refills: 0
Start: 2025-03-06 | End: 2025-04-04

## 2025-03-06 RX ORDER — ROSUVASTATIN CALCIUM 20 MG/1
1 TABLET, FILM COATED ORAL
Refills: 0 | DISCHARGE

## 2025-03-06 RX ORDER — DAPAGLIFLOZIN 5 MG/1
10 TABLET, FILM COATED ORAL DAILY
Refills: 0 | Status: DISCONTINUED | OUTPATIENT
Start: 2025-03-06 | End: 2025-03-06

## 2025-03-06 RX ORDER — DIPHENHYDRAMINE HCL 12.5MG/5ML
25 ELIXIR ORAL ONCE
Refills: 0 | Status: COMPLETED | OUTPATIENT
Start: 2025-03-06 | End: 2025-03-06

## 2025-03-06 RX ORDER — HYDROCHLOROTHIAZIDE 50 MG/1
1 TABLET ORAL
Refills: 0 | DISCHARGE

## 2025-03-06 RX ORDER — NALOXONE HYDROCHLORIDE 0.4 MG/ML
1 INJECTION, SOLUTION INTRAMUSCULAR; INTRAVENOUS; SUBCUTANEOUS
Qty: 1 | Refills: 0
Start: 2025-03-06

## 2025-03-06 RX ORDER — LOSARTAN POTASSIUM 100 MG/1
1 TABLET, FILM COATED ORAL
Qty: 30 | Refills: 0
Start: 2025-03-06 | End: 2025-04-04

## 2025-03-06 RX ORDER — OXYCODONE HYDROCHLORIDE 30 MG/1
15 TABLET ORAL ONCE
Refills: 0 | Status: DISCONTINUED | OUTPATIENT
Start: 2025-03-06 | End: 2025-03-06

## 2025-03-06 RX ADMIN — Medication 25 MILLIGRAM(S): at 05:35

## 2025-03-06 RX ADMIN — LOSARTAN POTASSIUM 25 MILLIGRAM(S): 100 TABLET, FILM COATED ORAL at 04:56

## 2025-03-06 RX ADMIN — Medication 81 MILLIGRAM(S): at 09:08

## 2025-03-06 RX ADMIN — TAMSULOSIN HYDROCHLORIDE 0.4 MILLIGRAM(S): 0.4 CAPSULE ORAL at 04:56

## 2025-03-06 RX ADMIN — TICAGRELOR 90 MILLIGRAM(S): 90 TABLET ORAL at 04:56

## 2025-03-06 RX ADMIN — METOPROLOL SUCCINATE 50 MILLIGRAM(S): 50 TABLET, EXTENDED RELEASE ORAL at 04:56

## 2025-03-06 RX ADMIN — OXYCODONE HYDROCHLORIDE 15 MILLIGRAM(S): 30 TABLET ORAL at 05:35

## 2025-03-06 NOTE — PROGRESS NOTE ADULT - REASON FOR ADMISSION
Chest Pain

## 2025-03-06 NOTE — PROGRESS NOTE ADULT - ASSESSMENT
64 year old male with PMH HTN, DM, HLD, BPH ,hx cerical decompression surgery 4/2024, with recurrent syncopal episodes, who presents from the office after and abnormal echo and Nuclear stress test.  He reports MOCK, denies exertional chest pain.  TTE with mild LV systolic dysfunction and NST with anterior and apical ischemia. He is planned for cardiac cath today.          Problem/Recommendation - 1:  ·  Problem: Abnormal stress test.   ·  Recommendation: S/P  Cardiac cath with JEF x2  and S/P staged PCI .   Continue DAPT , Statin .  Cardiology following.     Problem/Recommendation - 2:  ·  Problem: Syncope.   ·  Recommendation: S/P Cardiac cath and Neuro evaluation noted..   Will check Orthostasis also.  Said feel better.      Problem/Recommendation - 3:  ·  Problem: DM (diabetes mellitus).   ·  Recommendation: SSI for now.     Problem/Recommendation - 4:  ·  Problem: HTN (hypertension).   ·  Recommendation: Amlodipine and HCTZ.     Problem/Recommendation - 5:  ·  Problem: HLD (hyperlipidemia).   ·  Recommendation: on Statin .     Problem/Recommendation - 6:  ·  Problem: BPH without urinary obstruction.   ·  Recommendation: Continue Flomax.      
64 year old male with PMH HTN, DM, HLD, BPH ,hx cerical decompression surgery 4/2024, with recurrent syncopal episodes, who presents from the office after and abnormal echo and Nuclear stress test.  He reports MOCK, denies exertional chest pain.  TTE with mild LV systolic dysfunction and NST with anterior and apical ischemia. He is planned for cardiac cath today.          Problem/Recommendation - 1:  ·  Problem: Abnormal stress test.   ·  Recommendation: S/P  Cardiac cath with JEF x2  and awaiting staged PCI .   Continue DAPT , Statin .  Cardiology following.     Problem/Recommendation - 2:  ·  Problem: Syncope.   ·  Recommendation: S/P Cardiac cath and Having Neuro evaluation .   Will check Orthostasis also.  Said feel better.      Problem/Recommendation - 3:  ·  Problem: DM (diabetes mellitus).   ·  Recommendation: SSI for now.     Problem/Recommendation - 4:  ·  Problem: HTN (hypertension).   ·  Recommendation: Amlodipine and HCTZ.     Problem/Recommendation - 5:  ·  Problem: HLD (hyperlipidemia).   ·  Recommendation: on Statin .     Problem/Recommendation - 6:  ·  Problem: BPH without urinary obstruction.   ·  Recommendation: Continue Flomax.    
64 year old male with PMH HTN, DM, HLD, BPH ,hx cerical decompression surgery 4/2024, with recurrent syncopal episodes, who presents from the office after and abnormal echo and Nuclear stress test.  He reports MOCK, denies exertional chest pain.  TTE with mild LV systolic dysfunction and NST with anterior and apical ischemia. He is planned for cardiac cath today.          Problem/Recommendation - 1:  ·  Problem: Abnormal stress test.   ·  Recommendation: S/P  Cardiac cath with JEF x2  and S/P staged PCI .   Continue DAPT , Statin .  Cardiology following.     Problem/Recommendation - 2:  ·  Problem: Syncope.   ·  Recommendation: S/P Cardiac cath and Neuro evaluation noted..   Will check Orthostasis also.  Said feel better.   c< from: CT Angio Neck w/ IV Cont (03.05.25 @ 20:12) >  IMPRESSION:    CT BRAIN:  No acute intracranial hemorrhage, mass effect, or CT evidence   of an acute or recent subacute transcortical infarct.    CTA NECK:  No significant stenosis of the cervical carotid or vertebral   arteries.    CTA HEAD:  No significant stenosis or occlusion of the major proximal   branches of the [place].    < end of copied text >     Problem/Recommendation - 3:  ·  Problem: DM (diabetes mellitus).   ·  Recommendation: SSI for now.     Problem/Recommendation - 4:  ·  Problem: HTN (hypertension).   ·  Recommendation: Amlodipine and HCTZ.     Problem/Recommendation - 5:  ·  Problem: HLD (hyperlipidemia).   ·  Recommendation: on Statin .     Problem/Recommendation - 6:  ·  Problem: BPH without urinary obstruction.   ·  Recommendation: Continue Flomax.          
64 year old male with PMH HTN, DM, HLD, hx cervical decompression surgery 4/2024, with recurrent syncopal episodes, who presents from the office after and abnormal echo and Nuclear stress test.  No prodromal symptoms to snycope, no clear post ictal phenomenon  Happens with prolonged standing   Recent Ozempic use may be contributing     Recurrent syncope - likely vasovagal , low suspicion for primary neurologic episodes. Cardio does not feel like this arrhythmia relate either  Prior cervical decompression    - HCTZ was stopped, may also have been contributing to syncope  - CTH, CTA unermarkable   - routine eeg was neg  - tele montior  - check orthostatics  - holter monitor on d/c  - TTE reviewed, no clear cardiac etiology  - on ASA and Brilinta per Cardio  - dc planning tmorrow      Celeste Chavez DO  Vascular Neurology  Office [phone]  Available via Microsoft Teams          
64 year old male with PMH HTN, DM, HLD, hx cervical decompression surgery 4/2024, with recurrent syncopal episodes, who presents from the office after and abnormal echo and Nuclear stress test.  No prodromal symptoms to snycope, no clear post ictal phenomenon  Happens with prolonged standing   Recent Ozempic use may be contributing     Recurrent syncope - likely vasovagal , low suspicion for primary neurologic episodes. Cardio does not feel like this arrhythmia relate either  Prior cervical decompression    - HCTZ was stopped, may also have been contributing to syncope  - CTH, CTA unermarkable   - routine eeg was neg  - tele montior  - check orthostatics  - holter monitor on d/c  - TTE reviewed, no clear cardiac etiology  - on ASA and Brilinta per Cardio  - dc planning today    Celeste Chavez DO  Vascular Neurology  Office [phone]  Available via Microsoft Teams

## 2025-03-06 NOTE — PROGRESS NOTE ADULT - PROVIDER SPECIALTY LIST ADULT
Cardiology
Electrophysiology
Electrophysiology
Neurology
Cardiology
Internal Medicine
Cardiology
Internal Medicine
Internal Medicine
Neurology

## 2025-03-06 NOTE — DISCHARGE NOTE PROVIDER - NSDCFUSCHEDAPPT_GEN_ALL_CORE_FT
Beto Bynum  [facility] Physician [facility]  UROLOGY [address]  Scheduled Appointment: 05/21/2025

## 2025-03-06 NOTE — DISCHARGE NOTE PROVIDER - NSDCCPCAREPLAN_GEN_ALL_CORE_FT
PRINCIPAL DISCHARGE DIAGNOSIS  Diagnosis: Abnormal stress test  Assessment and Plan of Treatment: You were admitted for an abnormal stress test and echocardiogram. You underwent two cardiac catheterizations, called a staged PCI. You had a total of three stents placed. You have an additional lesion in one of the vessels of the heart that will be managed medically. Continue taking your medications as ordered. Do not stop unless instructed by your cardiologist. No heavy lifting, strenuous activity, bending, straining, or unnecessary stair climbing for 2 weeks. No sex for 1 week. No driving for 2 days. You may shower 24 hours following procedure but avoid baths and swimming for 1 week. Check wrist site for bleeding and/or swelling daily following procedure. Call your doctor/cardiologist immediately for bleeding or swelling or if you have increased/persistent pain or drainage at the site. Follow up with your cardiologist in 1- 2 weeks.      SECONDARY DISCHARGE DIAGNOSES  Diagnosis: Syncope  Assessment and Plan of Treatment: You were also evaluated for syncopal episodes. Your syncopal episodes are likely vasovagal vs. autonomic dysfunction (contributed to by diabtes, heart disease, sleep apnea, Ozempic) and less likely to be due to heart rhythm problems. Follow-up with electrophysiology outpatient for a Holter monitor, which is a wearable cardiac monitor that detects abnormal heart rhythms.   You were also seen by neurology. CT of the head and neck were normal. EEG was normal and did not show any seizures. Your echocardiogram did not show any cardiac reasons for your syncopal episodes.   Continue taking your medications as prescribed. Follow-up with your primary care provider.

## 2025-03-06 NOTE — DISCHARGE NOTE PROVIDER - CARE PROVIDERS DIRECT ADDRESSES
,[email or web address]".[employer],DirectAddress_Unknown,DirectAddress_Unknown

## 2025-03-06 NOTE — PROGRESS NOTE ADULT - SUBJECTIVE AND OBJECTIVE BOX
DATE OF SERVICE: 03-05-25    Patient denies chest pain or shortness of breath.  Review of symptoms otherwise negative.    MEDICATIONS:  aspirin enteric coated 81 milliGRAM(s) Oral daily  dapagliflozin 10 milliGRAM(s) Oral daily  gabapentin 300 milliGRAM(s) Oral <User Schedule>  influenza   Vaccine 0.5 milliLiter(s) IntraMuscular once  losartan 25 milliGRAM(s) Oral daily  metoprolol succinate ER 50 milliGRAM(s) Oral daily  rosuvastatin 40 milliGRAM(s) Oral at bedtime  sodium chloride 0.9%. 500 milliLiter(s) IV Continuous <Continuous>  tamsulosin 0.4 milliGRAM(s) Oral two times a day  ticagrelor 90 milliGRAM(s) Oral every 12 hours                            13.3   4.12  )-----------( 216      ( 06 Mar 2025 06:25 )             39.3     136  |  105  |  16  ----------------------------<  115[H]  4.0   |  20[L]  |  1.20    Ca    8.9      06 Mar 2025 06:25  Phos  2.5     03-06  Mg     1.90     03-06    Creatinine Trend: 1.20<--, 1.07<--, 1.14<--, 1.13<--    T(C): 36.3 (03-06-25 @ 11:50), Max: 36.8 (03-06-25 @ 04:45)  HR: 84 (03-06-25 @ 11:50) (70 - 100)  BP: 130/91 (03-06-25 @ 11:50) (97/60 - 130/91)  RR: 18 (03-06-25 @ 11:50) (16 - 18)  SpO2: 100% (03-06-25 @ 11:50) (98% - 100%)  Wt(kg): --    General:  Alert and Oriented * 3.   Head: Normocephalic and atraumatic.   Neck: No JVD. No bruits. Supple. Does not appear to be enlarged.   Cardiovascular: + S1,S2 ; RRR Soft systolic murmur at the left lower sternal border. No rubs noted.    Lungs: CTA b/l. No rhonchi, rales or wheezes.   Abdomen: + BS, soft. Non tender. Non distended. No rebound. No guarding.   Extremities: No clubbing/cyanosis/edema.   Neurologic: Moves all four extremities. Full range of motion.   Skin: Warm and moist. The patient's skin has normal elasticity and good skin turgor.   Psychiatric: Appropriate mood and affect.  Musculoskeletal: Normal range of motion, normal strength       TELE: NSR      < from: Cardiac Catheterization (03.03.25 @ 15:18) >  Conclusions:   Subtotaled LAD lesion s/p 2 JEF from proximal to mid LAD     Severe mid LCX lesion and small caliber OM1 with severe disease   distal rPDA lesion that is 90%   Recommendations:     c/w ASA and Brilinta for 1 year   Increase crestor to 40 mg   Plan to manage rPDA lesion and OM  lesion medically   Staged PCI to LCX     < end of copied text >      < from: TTE W or WO Ultrasound Enhancing Agent (03.04.25 @ 15:25) >     CONCLUSIONS:      1. The left ventricular cavity is normal in size. Left ventricular wall thickness is normal. Left ventricular systolic function is mildly decreased with a calculated ejection fraction of 52 % by the Malik's biplane method of disks. There are regional wall motion abnormalities present. Hypokinesis of the distal septum and anteroapex. There is mild (grade 1) left ventricular diastolic dysfunction.   2. Normal right ventricular cavity size and normal right ventricular systolic function. Tricuspid annular plane systolic excursion (TAPSE) is 1.7 cm (normal >=1.7 cm).   3. Structurally normal mitral valve with normal leaflet excursion. There is calcification of the mitral valve annulus. There is trace mitral regurgitation.  < end of copied text >      < from: CT Angio Neck w/ IV Cont (03.05.25 @ 20:12) >    IMPRESSION:    CT BRAIN:  No acute intracranial hemorrhage, mass effect, or CT evidence   of an acute or recent subacute transcortical infarct.    CTA NECK:  No significant stenosis of the cervical carotid or vertebral   arteries.    CTA HEAD:  No significant stenosis or occlusion of the major proximal   branches of the [place].    --- End of Report ---    < end of copied text >    EEG:  Clinical Impression:  A normal routine EEG neither refutes nor supports a diagnosis of epilepsy.   No epileptiform abnormalities or seizures.    64 year old male with PMH HTN, DM, HLD, hx cerical decompression surgery 4/2024, with recurrent syncopal episodes, who presents from the office after and abnormal echo and Nuclear stress test.  He reports MOCK, denies exertional chest pain.  TTE with mild LV systolic dysfunction and NST with anterior and apical ischemia    - s/p Cath 3/4, 99% prox LAD, 80% mid LCX, 99% small vessel OM1, RCA with mild disease and rPDA with 80% lesion  - s/p Stentx2 from prox to mid LAD,  --s/p staged PCI to 80% Lcx 3/5-- 80% distal rPDA lesion that will be managed medically  - c/w ASA and Brilinta  - Cont Toprol XL 50 mg daily  - change nifedipine to losartan in setting of DM, Cont Losartan 25mg daily  - Neuro F/U and testing WNL as above  - cont Crestor 40mg daily  --added Farxiga 10mg daily    F/U with Dr Gutiérrez 3/10 at 4 PM as scheduled [phone]  DC home today    Karolina RYAN  [phone]

## 2025-03-06 NOTE — DISCHARGE NOTE PROVIDER - CARE PROVIDER_API CALL
KACEY ROMEO, LUCRECIA DOUGLAS  Phone: [phone]  Fax: ()-  Follow Up Time: 1 week    Olman Hook  Cardiac Electrophysiology  [address], [id number]  [place] [address]  Phone: [phone]  Fax: [phone]  Follow Up Time: 1 week    Jose Manuel Diaz  Interventional Cardiology  [address] [place] [address], [id number]  [place] [address]  Phone: [phone]  Fax: [phone]  Follow Up Time: 1 week   KACEY ROMEO, LUCRECIA DOUGLAS  Phone: [phone]  Fax: ()-  Follow Up Time: 1 week    Olman Hook  Cardiac Electrophysiology  [address] [place] [address], [id number]  [place] [address]  Phone: [phone]  Fax: [phone]  Follow Up Time: 1 week    Alix Gutiérrez  Cardiovascular Disease  [address] [facility], [address]  [place] [address]  Phone: [phone]  Fax: [phone]  Scheduled Appointment: 03/10/2025 04:00 PM

## 2025-03-06 NOTE — CHART NOTE - NSCHARTNOTEFT_GEN_A_CORE
MEDICINE ACP NIGHT COVERAGE    Patient seen at bedside status post cath via R radial artery. Site clean, dry and intact. No bleeding, underlying hematoma, or erythema. Patient denies chest pain, SOB, numbness/weakness/tingling. Pulses present, capillary refill appropriate. Patient educated and understands if any of the above symptoms were to arise, to notify RN. Will continue to monitor closely.    T(C): 36.6 (03-03-25 @ 20:46), Max: 36.7 (03-03-25 @ 14:32)  HR: 85 (03-03-25 @ 20:46) (85 - 98)  BP: 140/81 (03-03-25 @ 20:46) (112/86 - 154/100)  RR: 18 (03-03-25 @ 20:46) (15 - 19)  SpO2: 100% (03-03-25 @ 20:46) (96% - 100%)    Teo Phipps PA-C  Medicine ACP   [id number]
Patient is s/p staged PCI via right radial access. Radial pulses 2+ palpated bilaterally. Patient denies numbness/tingling in upper extremities. Capillary refill <3 seconds. No edema or hematoma noted.    Patient medically cleared for discharge.    Roxana Juan, JUSTIN-BC  Department of Medicine  [facility]  [id number]
Patient s/p LHC: LCx 80% x1 JEF, RRA accessed. Right radial site dressing C/D/I, no hematoma, < 2 sec capillary refill, hand warm, +ROM. Will continue to monitor.
Cardiac Rehabilitation Referral (Post PCI):       - Education on benefits of cardiac rehab provided to patient: Yes  - Referral and prescription given for cardiac rehab: Yes  - Patient given a list of locations and instructed to contact their insurance company to review list of participating providers: Yes  - Patient instructed to bring cardiac rehab prescription with them to cardiology follow up appointment for assistance with enrollment: Yes  - Patient discharged with copies detailing cardiovascular history, medications, testing/treatments: Yes

## 2025-03-06 NOTE — PROGRESS NOTE ADULT - SUBJECTIVE AND OBJECTIVE BOX
EP Attending  HISTORY OF PRESENT ILLNESS: HPI:  64 year old male with PMH HTN, DM, HLD, hx cerical decompression surgery 4/2024, with recurrent syncopal episodes, who presents from the office after and abnormal echo and Nuclear stress test.  He reports MOCK, denies exertional chest pain.  TTE with mild LV systolic dysfunction and NST with anterior and apical ischemia.    No prior hx CAD, MI, CHF, No prior cardiac work up.  Has not yet been evaluated by a Neurologist for recurrent syncope. (03 Mar 2025 13:26)    Newly identified/ previously undiagnosed severe multivesell CAD.  Remarkably, has undergone multiple prior surgeries without incident.  No palpitations, and not experiencing angina, albeit at low cardiac workload at home.  Syncopal episodes tend to occur after prolonged standing, no significant prodrome.  A 10 pt ROS is otherwise negative.  He is s/p LAD PCI today.  3/5- resting in bed, no new issues.  Date of service 3/6- awaiting dc timing.    PAST MEDICAL & SURGICAL HISTORY:  Diabetes mellitus  type II, diagnosed in 2018  Hypertension  Snoring  MACEY precautions -- responds affirmatively to STOP BANG questionnaire  Enlarged prostate  H/O phimosis  2019  Arthropathy  right shoulder -- never worked up  H/O cholelithiasis  s/p lap cholecystectomy  Enlarged prostate  negatie biopsy  History of back surgery  S/P left rotator cuff repair  S/P right rotator cuff repair    aspirin enteric coated 81 milliGRAM(s) Oral daily  dapagliflozin 10 milliGRAM(s) Oral daily  gabapentin 300 milliGRAM(s) Oral <User Schedule>  influenza   Vaccine 0.5 milliLiter(s) IntraMuscular once  losartan 25 milliGRAM(s) Oral daily  metoprolol succinate ER 50 milliGRAM(s) Oral daily  rosuvastatin 40 milliGRAM(s) Oral at bedtime  sodium chloride 0.9%. 500 milliLiter(s) IV Continuous <Continuous>  tamsulosin 0.4 milliGRAM(s) Oral two times a day  ticagrelor 90 milliGRAM(s) Oral every 12 hours                        13.3   4.12  )-----------( 216      ( 06 Mar 2025 06:25 )             39.3       03-06    136  |  105  |  16  ----------------------------<  115[H]  4.0   |  20[L]  |  1.20    Ca    8.9      06 Mar 2025 06:25  Phos  2.5     03-06  Mg     1.90     03-06      T(C): 36.3 (03-06-25 @ 11:50), Max: 36.8 (03-06-25 @ 04:45)  HR: 84 (03-06-25 @ 11:50) (84 - 100)  BP: 130/91 (03-06-25 @ 11:50) (97/60 - 130/91)  RR: 18 (03-06-25 @ 11:50) (18 - 18)  SpO2: 100% (03-06-25 @ 11:50) (98% - 100%)  Wt(kg): --    I&O's Summary    Appearance: Normal appearing adult male in no acute distress	  HEENT:   Normal oral mucosa, PERRL, EOMI	  Lymphatic: No lymphadenopathy , no edema  Cardiovascular: Normal S1 S2, No JVD, No murmurs , Peripheral pulses palpable 2+ bilaterally  Respiratory: Lungs clear to auscultation, normal effort 	  Gastrointestinal:  Soft, Non-tender, + BS	  Skin: No rashes, No ecchymoses, No cyanosis, warm to touch  Musculoskeletal: Normal range of motion, normal strength  Psychiatry:  Mood & affect appropriate    TELEMETRY: NSR, no PVCs, no arrhythmia.	    inpatient echo pending    ASSESSMENT/PLAN: Mr Bunch is a pleasant 64y Male here for expedited workup of abnormal office stress test.  New dx of multivessel severe CAD, and is s/p PCI to LAD.  Syncopal history seems to be vasovagal vs autonomic dysfunction (re: diabetes, CAD, MACEY, vs feeling poorly while a new start on Ozempic), and less likely to be due to ischemi-driven ventricular arrhythmia.  Coronary revascularization should take highest priority.  Inpatient, maintain cardiac telemetry.  Outpatient, Holter/MCOT for arrhythmia surveillance.  Will follow with you.      Olman Hook M.D.  Cardiac Electrophysiology    office [phone]  pager [phone]

## 2025-03-06 NOTE — DISCHARGE NOTE PROVIDER - NSDCMRMEDTOKEN_GEN_ALL_CORE_FT
aspirin 81 mg oral delayed release tablet: 1 tab(s) orally once a day  Brilinta (ticagrelor) 90 mg oral tablet: 1 tab(s) orally 2 times a day for a minimum of 1 year  Cardiac rehab: Cardiac rehab  2 to 3 times per week for 12 weeks  s/p coronary stent  Cardiac Rehab Referral: Cardiac Rehab 2-3 times per week for 12 weeks  Status Post PCI to LCx  gabapentin 300 mg oral tablet: 1 tab(s) orally 3 times a day (patient says he normally takes it 3x/week at bedtime)  losartan 25 mg oral tablet: 1 tab(s) orally once a day  metoprolol succinate 50 mg oral tablet, extended release: 1 tab(s) orally once a day  Movantik 25 mg oral tablet: 1 tab(s) orally once a day (patient takes it every other day)  Narcan 4 mg/0.1 mL nasal spray: 1 spray(s) intranasally once as needed for  opioid overdose  rosuvastatin 20 mg oral tablet: 1 tab(s) orally once a day  RoxyBond 15 mg oral tablet: 1 tab(s) orally 3 times a day  tamsulosin 0.4 mg oral capsule: 1 cap(s) orally 2 times a day  traMADol 50 mg oral tablet: 1 tab(s) orally 3 times a day as needed for  pain (patient reports he rarely takes tramadol)   aspirin 81 mg oral delayed release tablet: 1 tab(s) orally once a day  Brilinta (ticagrelor) 90 mg oral tablet: 1 tab(s) orally 2 times a day for a minimum of 1 year  Cardiac rehab: Cardiac rehab  2 to 3 times per week for 12 weeks  s/p coronary stent  Cardiac Rehab Referral: Cardiac Rehab 2-3 times per week for 12 weeks  Status Post PCI to LCx  dapagliflozin 10 mg oral tablet: 1 tab(s) orally once a day  gabapentin 300 mg oral tablet: 1 tab(s) orally 3 times a day (patient says he normally takes it 3x/week at bedtime)  losartan 25 mg oral tablet: 1 tab(s) orally once a day  metoprolol succinate 50 mg oral tablet, extended release: 1 tab(s) orally once a day  Movantik 25 mg oral tablet: 1 tab(s) orally once a day (patient takes it every other day)  Narcan 4 mg/0.1 mL nasal spray: 1 spray(s) intranasally once as needed for  opioid overdose  rosuvastatin 40 mg oral tablet: 1 tab(s) orally once a day (at bedtime)  RoxyBond 15 mg oral tablet: 1 tab(s) orally 3 times a day  tamsulosin 0.4 mg oral capsule: 1 cap(s) orally 2 times a day  traMADol 50 mg oral tablet: 1 tab(s) orally 3 times a day as needed for  pain (patient reports he rarely takes tramadol)

## 2025-03-06 NOTE — DISCHARGE NOTE PROVIDER - NSDCFUADDINST_GEN_ALL_CORE_FT
Continue your medications as directed. Do not stop Aspirin or Brilinta unless instructed by your cardiologist.  No heavy lifting, strenuous activity, bending, straining, or unnecessary stair climbing for 2 weeks. No sex for 1 week. No driving for 2 days. You may shower 24 hours following procedure but avoid baths and swimming for 1 week. Check wrist site for bleeding and/or swelling daily following procedure. Call your doctor/cardiologist immediately for bleeding or swelling or if you have increased/persistent pain or drainage at the site. Follow up with your cardiologist in 1- 2 weeks.

## 2025-03-06 NOTE — PROGRESS NOTE ADULT - SUBJECTIVE AND OBJECTIVE BOX
Neurology Progress Note    S: Patient seen and examined.     Medications: MEDICATIONS  (STANDING):  aspirin enteric coated 81 milliGRAM(s) Oral daily  dapagliflozin 10 milliGRAM(s) Oral daily  gabapentin 300 milliGRAM(s) Oral <User Schedule>  influenza   Vaccine 0.5 milliLiter(s) IntraMuscular once  losartan 25 milliGRAM(s) Oral daily  metoprolol succinate ER 50 milliGRAM(s) Oral daily  rosuvastatin 40 milliGRAM(s) Oral at bedtime  sodium chloride 0.9%. 500 milliLiter(s) (75 mL/Hr) IV Continuous <Continuous>  tamsulosin 0.4 milliGRAM(s) Oral two times a day  ticagrelor 90 milliGRAM(s) Oral every 12 hours    MEDICATIONS  (PRN):       Vitals:  Vital Signs Last 24 Hrs  T(C): 36.3 (06 Mar 2025 11:50), Max: 36.8 (06 Mar 2025 04:45)  T(F): 97.3 (06 Mar 2025 11:50), Max: 98.2 (06 Mar 2025 04:45)  HR: 84 (06 Mar 2025 11:50) (84 - 96)  BP: 130/91 (06 Mar 2025 11:50) (117/76 - 130/91)  BP(mean): --  RR: 18 (06 Mar 2025 11:50) (18 - 18)  SpO2: 100% (06 Mar 2025 11:50) (98% - 100%)    Parameters below as of 06 Mar 2025 11:50  Patient On (Oxygen Delivery Method): room air            General Exam:   General Appearance: Appropriately dressed and in no acute distress       Head: Normocephalic, atraumatic and no dysmorphic features  Ear, Nose, and Throat: Moist mucous membranes  CVS: S1S2+  Resp: No SOB, no wheeze or rhonchi  Abd: soft NTND  Extremities: No edema, no cyanosis  Skin: No bruises, no rashes     Neurological Exam:  Mental Status: Awake, alert and oriented x 3.  Able to follow simple and complex verbal commands. Able to name and repeat. fluent speech. No obvious aphasia or dysarthria noted.   Cranial Nerves: PERRL, EOMI, VFFC, sensation V1-V3 intact,  no obvious facial asymmetry, equal elevation of palate, scm/trap 5/5, tongue is midline on protrusion. hearing is grossly intact.   Motor: Normal bulk, tone and strength throughout. Fine finger movements were intact and symmetric. no tremors or drift noted.    Sensation: Intact to light touch and pinprick throughout. no right/left confusion. no extinction to tactile on DSS.   Reflexes: 1+ throughout at biceps, brachioradialis, triceps, patellars and ankles bilaterally and equal. No clonus. R toe and L toe were both downgoing.  Coordination: No dysmetria on FNF but some trouble with L due to previous surgeries  Gait: Unsteady, + Romberg    I personally reviewed the below data/images/labs:      LABS:                          13.3   4.12  )-----------( 216      ( 06 Mar 2025 06:25 )             39.3     03-06    136  |  105  |  16  ----------------------------<  115[H]  4.0   |  20[L]  |  1.20    Ca    8.9      06 Mar 2025 06:25  Phos  2.5     03-06  Mg     1.90     03-06          Urinalysis Basic - ( 06 Mar 2025 06:25 )    Color: x / Appearance: x / SG: x / pH: x  Gluc: 115 mg/dL / Ketone: x  / Bili: x / Urobili: x   Blood: x / Protein: x / Nitrite: x   Leuk Esterase: x / RBC: x / WBC x   Sq Epi: x / Non Sq Epi: x / Bacteria: x            c< from: TTE W or WO Ultrasound Enhancing Agent (03.04.25 @ 15:25) >  CONCLUSIONS:      1. The left ventricular cavity is normal in size. Left ventricular wall thickness is normal. Left ventricular systolic function is mildly decreased with a calculated ejection fraction of 52 % by the Malik's biplane method of disks. There are regional wall motion abnormalities present. Hypokinesis of the distal septum and anteroapex. There is mild (grade 1) left ventricular diastolic dysfunction.   2. Normal right ventricular cavity size and normal right ventricular systolic function. Tricuspid annular plane systolic excursion (TAPSE) is 1.7 cm (normal >=1.7 cm).   3. Structurally normal mitral valve with normal leaflet excursion. There is calcification of the mitral valve annulus. There is trace mitral regurgitation.    < end of copied text >

## 2025-03-06 NOTE — DISCHARGE NOTE NURSING/CASE MANAGEMENT/SOCIAL WORK - PATIENT PORTAL LINK FT
You can access the FollowMyHealth Patient Portal offered by [facility] by registering at the following website: [email or web address]. By joining [facility]’s FollowMyHealth portal, you will also be able to view your health information using other applications (apps) compatible with our system.

## 2025-03-06 NOTE — DISCHARGE NOTE NURSING/CASE MANAGEMENT/SOCIAL WORK - FINANCIAL ASSISTANCE
[facility] provides services at a reduced cost to those who are determined to be eligible through [facility]’s financial assistance program. Information regarding [facility]’s financial assistance program can be found by going to [email or web address] or by calling [phone].

## 2025-03-06 NOTE — DISCHARGE NOTE PROVIDER - PROVIDER TOKENS
PROVIDER:[TOKEN:[[id number]],FOLLOWUP:[1 week]],PROVIDER:[TOKEN:[[id number]],FOLLOWUP:[1 week]],PROVIDER:[TOKEN:[[id number]],FOLLOWUP:[1 week]] PROVIDER:[TOKEN:[[id number]],FOLLOWUP:[1 week]],PROVIDER:[TOKEN:[[id number]],FOLLOWUP:[1 week]],PROVIDER:[TOKEN:[[id number]],SCHEDULEDAPPT:[03/10/2025],SCHEDULEDAPPTTIME:[04:00 PM]]

## 2025-03-06 NOTE — PROGRESS NOTE ADULT - NS ATTEND AMEND GEN_ALL_CORE FT
Patient seen and examined. Agree with plan as detailed in PA/NP Note.     Start on SGLT-2  C/w ASA and levi Diaz MD  Pager: [phone]

## 2025-03-06 NOTE — PROGRESS NOTE ADULT - SUBJECTIVE AND OBJECTIVE BOX
Date of Service  : 03-06-25     INTERVAL HPI/OVERNIGHT EVENTS: I feel fine.   Vital Signs Last 24 Hrs  T(C): 36.8 (06 Mar 2025 04:45), Max: 36.8 (05 Mar 2025 08:51)  T(F): 98.2 (06 Mar 2025 04:45), Max: 98.2 (05 Mar 2025 08:51)  HR: 96 (06 Mar 2025 04:45) (70 - 100)  BP: 117/76 (06 Mar 2025 04:45) (97/60 - 135/80)  BP(mean): --  RR: 18 (06 Mar 2025 04:45) (12 - 18)  SpO2: 98% (06 Mar 2025 04:45) (95% - 99%)    Parameters below as of 06 Mar 2025 04:45  Patient On (Oxygen Delivery Method): room air      I&O's Summary    MEDICATIONS  (STANDING):  aspirin enteric coated 81 milliGRAM(s) Oral daily  gabapentin 300 milliGRAM(s) Oral <User Schedule>  influenza   Vaccine 0.5 milliLiter(s) IntraMuscular once  losartan 25 milliGRAM(s) Oral daily  metoprolol succinate ER 50 milliGRAM(s) Oral daily  rosuvastatin 40 milliGRAM(s) Oral at bedtime  sodium chloride 0.9%. 500 milliLiter(s) (75 mL/Hr) IV Continuous <Continuous>  tamsulosin 0.4 milliGRAM(s) Oral two times a day  ticagrelor 90 milliGRAM(s) Oral every 12 hours    MEDICATIONS  (PRN):    LABS:                        13.3   4.12  )-----------( 216      ( 06 Mar 2025 06:25 )             39.3     03-06    136  |  105  |  16  ----------------------------<  115[H]  4.0   |  20[L]  |  1.20    Ca    8.9      06 Mar 2025 06:25  Phos  2.5     03-06  Mg     1.90     03-06        Urinalysis Basic - ( 06 Mar 2025 06:25 )    Color: x / Appearance: x / SG: x / pH: x  Gluc: 115 mg/dL / Ketone: x  / Bili: x / Urobili: x   Blood: x / Protein: x / Nitrite: x   Leuk Esterase: x / RBC: x / WBC x   Sq Epi: x / Non Sq Epi: x / Bacteria: x      CAPILLARY BLOOD GLUCOSE            Urinalysis Basic - ( 06 Mar 2025 06:25 )    Color: x / Appearance: x / SG: x / pH: x  Gluc: 115 mg/dL / Ketone: x  / Bili: x / Urobili: x   Blood: x / Protein: x / Nitrite: x   Leuk Esterase: x / RBC: x / WBC x   Sq Epi: x / Non Sq Epi: x / Bacteria: x      REVIEW OF SYSTEMS:  CONSTITUTIONAL: No fever, weight loss, or fatigue  EYES: No eye pain, visual disturbances, or discharge  ENMT:  No difficulty hearing, tinnitus, vertigo; No sinus or throat pain  NECK: No pain or stiffness  RESPIRATORY: No cough, wheezing, chills or hemoptysis; No shortness of breath  CARDIOVASCULAR: No chest pain, palpitations, dizziness, or leg swelling  GASTROINTESTINAL: No abdominal or epigastric pain. No nausea, vomiting, or hematemesis; No diarrhea or constipation. No melena or hematochezia.  GENITOURINARY: No dysuria, frequency, hematuria, or incontinence      RADIOLOGY & ADDITIONAL TESTS:    Consultant(s) Notes Reviewed:  [x ] YES  [ ] NO    PHYSICAL EXAM:  GENERAL: NAD, well-groomed, well-developed,not in any distress ,  HEAD:  Atraumatic, Normocephalic  EYES: EOMI, PERRLA, conjunctiva and sclera clear  ENMT: No tonsillar erythema, exudates, or enlargement; Moist mucous membranes, Good dentition, No lesions  NECK: Supple, No JVD, Normal thyroid  NERVOUS SYSTEM:  Alert & Oriented X3, No focal deficit   CHEST/LUNG: Good air entry bilateral with no  rales, rhonchi, wheezing, or rubs  HEART: Regular rate and rhythm; No murmurs, rubs, or gallops  ABDOMEN: Soft, Nontender, Nondistended; Bowel sounds present  EXTREMITIES:  2+ Peripheral Pulses, No clubbing, cyanosis, or edema    Care Discussed with Consultants/Other Providers [ x] YES  [ ] NO

## 2025-03-06 NOTE — DISCHARGE NOTE PROVIDER - HOSPITAL COURSE
64 year old male with PMH HTN, DM, HLD, hx cervical decompression surgery 4/2024, with recurrent syncopal episodes, who presents from the office after and abnormal echo and Nuclear stress test.  He reports MOCK, denies exertional chest pain. TTE with mild LV systolic dysfunction and NST with anterior and apical ischemia. No prior hx CAD, MI, CHF, No prior cardiac work up.  Has not yet been evaluated by a Neurologist for recurrent syncope. (03 Mar 2025 13:26)    Patient underwent staged PCI this admission with 99% prox LAD, 80% mid LCX, 99% small vessel OM1, RCA with mild disease and rPDA with 80% lesion. S/p stent x2 from prox to mid LAD, and stent x1 to 80% mid LCX lesion. Patient has 80% distal rPDA lesion that will be managed medically. C/w ASA + Brilinta + Crestor, coreg changed to toprol 50mg QD, nifedipine changed to losartan in setting of DM.     Patient was evaluated by neurology and electrophysiology for syncope. As per EP, syncopal history seems to be vasovagal vs autonomic dysfunction (re: diabetes, CAD, MACEY, vs feeling poorly while a new start on Ozempic), and less likely to be due to ischemi-driven ventricular arrhythmia. Coronary revascularization should take highest priority. Patient to follow-up with EP outpatient for Holter/MCOT for arrhythmia surveillance. As per neurology, HCTZ discontinued (as may also have been contributing to syncope), CTH and CTA unremarkable, routine EEG negative, orthostatics negative. TTE with no clear cardiac etiology. C/w ASA and Brilinta as per cardiology.       HOSPITAL COURSE:  Vital Signs Last 24 Hrs  T(C): 36.8 (06 Mar 2025 04:45), Max: 36.8 (05 Mar 2025 08:51)  T(F): 98.2 (06 Mar 2025 04:45), Max: 98.2 (05 Mar 2025 08:51)  HR: 96 (06 Mar 2025 04:45) (70 - 100)  BP: 117/76 (06 Mar 2025 04:45) (97/60 - 135/80)  BP(mean): --  RR: 18 (06 Mar 2025 04:45) (12 - 18)  SpO2: 98% (06 Mar 2025 04:45) (95% - 99%)    Parameters below as of 06 Mar 2025 04:45  Patient On (Oxygen Delivery Method): room air          Patient currently denies chest pain, SOB, palpitations, dizziness, or lightheadedness. Case discussed with attending ____. SITE CHECK. Pt to be discharged home and follow-up with EP, cardiology, and primary care provider. 64 year old male with PMH HTN, DM, HLD, hx cervical decompression surgery 4/2024, with recurrent syncopal episodes, who presents from the office after and abnormal echo and Nuclear stress test.  He reports MOCK, denies exertional chest pain. TTE with mild LV systolic dysfunction and NST with anterior and apical ischemia. No prior hx CAD, MI, CHF, No prior cardiac work up.  Has not yet been evaluated by a Neurologist for recurrent syncope. (03 Mar 2025 13:26)    Patient underwent staged PCI this admission with 99% prox LAD, 80% mid LCX, 99% small vessel OM1, RCA with mild disease and rPDA with 80% lesion. S/p stent x2 from prox to mid LAD, and stent x1 to 80% mid LCX lesion. Patient has 80% distal rPDA lesion that will be managed medically. C/w ASA + Brilinta + Crestor, coreg changed to toprol 50mg QD, nifedipine changed to losartan in setting of DM.     Patient was evaluated by neurology and electrophysiology for syncope. As per EP, syncopal history seems to be vasovagal vs autonomic dysfunction (re: diabetes, CAD, MACEY, vs feeling poorly while a new start on Ozempic), and less likely to be due to ischemi-driven ventricular arrhythmia. Coronary revascularization should take highest priority. Patient to follow-up with EP outpatient for Holter/MCOT for arrhythmia surveillance. As per neurology, HCTZ discontinued (as may also have been contributing to syncope), CTH and CTA unremarkable, routine EEG negative, orthostatics negative. TTE with no clear cardiac etiology. C/w ASA and Brilinta as per cardiology.       HOSPITAL COURSE:  Vital Signs Last 24 Hrs  T(C): 36.8 (06 Mar 2025 04:45), Max: 36.8 (05 Mar 2025 08:51)  T(F): 98.2 (06 Mar 2025 04:45), Max: 98.2 (05 Mar 2025 08:51)  HR: 96 (06 Mar 2025 04:45) (70 - 100)  BP: 117/76 (06 Mar 2025 04:45) (97/60 - 135/80)  BP(mean): --  RR: 18 (06 Mar 2025 04:45) (12 - 18)  SpO2: 98% (06 Mar 2025 04:45) (95% - 99%)    Parameters below as of 06 Mar 2025 04:45  Patient On (Oxygen Delivery Method): room air          Patient currently denies chest pain, SOB, palpitations, dizziness, or lightheadedness. Case discussed with attending Dr. Diaz. Patient to be discharged home and follow-up with EP, cardiology, and primary care provider.

## 2025-03-19 ENCOUNTER — NON-APPOINTMENT (OUTPATIENT)
Age: 65
End: 2025-03-19

## 2025-03-26 ENCOUNTER — TRANSCRIPTION ENCOUNTER (OUTPATIENT)
Age: 65
End: 2025-03-26

## 2025-05-21 ENCOUNTER — APPOINTMENT (OUTPATIENT)
Dept: UROLOGY | Facility: CLINIC | Age: 65
End: 2025-05-21

## 2025-07-06 ENCOUNTER — EMERGENCY (EMERGENCY)
Facility: HOSPITAL | Age: 65
LOS: 1 days | End: 2025-07-06
Attending: EMERGENCY MEDICINE | Admitting: EMERGENCY MEDICINE
Payer: MEDICAID

## 2025-07-06 VITALS
SYSTOLIC BLOOD PRESSURE: 131 MMHG | TEMPERATURE: 98 F | RESPIRATION RATE: 18 BRPM | OXYGEN SATURATION: 95 % | DIASTOLIC BLOOD PRESSURE: 78 MMHG | HEART RATE: 94 BPM

## 2025-07-06 DIAGNOSIS — Z98.890 OTHER SPECIFIED POSTPROCEDURAL STATES: Chronic | ICD-10-CM

## 2025-07-06 DIAGNOSIS — N40.0 BENIGN PROSTATIC HYPERPLASIA WITHOUT LOWER URINARY TRACT SYMPTOMS: Chronic | ICD-10-CM

## 2025-07-06 DIAGNOSIS — Z87.19 PERSONAL HISTORY OF OTHER DISEASES OF THE DIGESTIVE SYSTEM: Chronic | ICD-10-CM

## 2025-07-06 LAB
A1C WITH ESTIMATED AVERAGE GLUCOSE RESULT: 12 % — HIGH (ref 4–5.6)
ALBUMIN SERPL ELPH-MCNC: 3.6 G/DL — SIGNIFICANT CHANGE UP (ref 3.3–5)
ALP SERPL-CCNC: 117 U/L — SIGNIFICANT CHANGE UP (ref 40–120)
ALT FLD-CCNC: 20 U/L — SIGNIFICANT CHANGE UP (ref 4–41)
ANION GAP SERPL CALC-SCNC: 12 MMOL/L — SIGNIFICANT CHANGE UP (ref 7–14)
AST SERPL-CCNC: 16 U/L — SIGNIFICANT CHANGE UP (ref 4–40)
B-OH-BUTYR SERPL-SCNC: <0 MMOL/L — SIGNIFICANT CHANGE UP (ref 0–0.4)
BASOPHILS # BLD AUTO: 0.03 K/UL — SIGNIFICANT CHANGE UP (ref 0–0.2)
BASOPHILS NFR BLD AUTO: 0.6 % — SIGNIFICANT CHANGE UP (ref 0–2)
BILIRUB SERPL-MCNC: 0.2 MG/DL — SIGNIFICANT CHANGE UP (ref 0.2–1.2)
BLOOD GAS VENOUS COMPREHENSIVE RESULT: SIGNIFICANT CHANGE UP
BUN SERPL-MCNC: 22 MG/DL — SIGNIFICANT CHANGE UP (ref 7–23)
CALCIUM SERPL-MCNC: 9 MG/DL — SIGNIFICANT CHANGE UP (ref 8.4–10.5)
CHLORIDE SERPL-SCNC: 101 MMOL/L — SIGNIFICANT CHANGE UP (ref 98–107)
CO2 SERPL-SCNC: 22 MMOL/L — SIGNIFICANT CHANGE UP (ref 22–31)
CREAT SERPL-MCNC: 1.35 MG/DL — HIGH (ref 0.5–1.3)
EGFR: 59 ML/MIN/1.73M2 — LOW
EGFR: 59 ML/MIN/1.73M2 — LOW
EOSINOPHIL # BLD AUTO: 0.21 K/UL — SIGNIFICANT CHANGE UP (ref 0–0.5)
EOSINOPHIL NFR BLD AUTO: 4.2 % — SIGNIFICANT CHANGE UP (ref 0–6)
ESTIMATED AVERAGE GLUCOSE: 298 — SIGNIFICANT CHANGE UP
GLUCOSE SERPL-MCNC: 339 MG/DL — HIGH (ref 70–99)
HCT VFR BLD CALC: 39.7 % — SIGNIFICANT CHANGE UP (ref 39–50)
HGB BLD-MCNC: 13.4 G/DL — SIGNIFICANT CHANGE UP (ref 13–17)
IMM GRANULOCYTES # BLD AUTO: 0.03 K/UL — SIGNIFICANT CHANGE UP (ref 0–0.07)
IMM GRANULOCYTES NFR BLD AUTO: 0.6 % — SIGNIFICANT CHANGE UP (ref 0–0.9)
LYMPHOCYTES # BLD AUTO: 0.87 K/UL — LOW (ref 1–3.3)
LYMPHOCYTES NFR BLD AUTO: 17.2 % — SIGNIFICANT CHANGE UP (ref 13–44)
MCHC RBC-ENTMCNC: 29.8 PG — SIGNIFICANT CHANGE UP (ref 27–34)
MCHC RBC-ENTMCNC: 33.8 G/DL — SIGNIFICANT CHANGE UP (ref 32–36)
MCV RBC AUTO: 88.4 FL — SIGNIFICANT CHANGE UP (ref 80–100)
MONOCYTES # BLD AUTO: 0.4 K/UL — SIGNIFICANT CHANGE UP (ref 0–0.9)
MONOCYTES NFR BLD AUTO: 7.9 % — SIGNIFICANT CHANGE UP (ref 2–14)
NEUTROPHILS # BLD AUTO: 3.52 K/UL — SIGNIFICANT CHANGE UP (ref 1.8–7.4)
NEUTROPHILS NFR BLD AUTO: 69.5 % — SIGNIFICANT CHANGE UP (ref 43–77)
NRBC # BLD AUTO: 0.02 K/UL — HIGH (ref 0–0)
NRBC # FLD: 0.02 K/UL — HIGH (ref 0–0)
NRBC BLD AUTO-RTO: 0 /100 WBCS — SIGNIFICANT CHANGE UP (ref 0–0)
PLATELET # BLD AUTO: 199 K/UL — SIGNIFICANT CHANGE UP (ref 150–400)
PMV BLD: 9.2 FL — SIGNIFICANT CHANGE UP (ref 7–13)
POTASSIUM SERPL-MCNC: 4.5 MMOL/L — SIGNIFICANT CHANGE UP (ref 3.5–5.3)
POTASSIUM SERPL-SCNC: 4.5 MMOL/L — SIGNIFICANT CHANGE UP (ref 3.5–5.3)
PROT SERPL-MCNC: 6.8 G/DL — SIGNIFICANT CHANGE UP (ref 6–8.3)
RBC # BLD: 4.49 M/UL — SIGNIFICANT CHANGE UP (ref 4.2–5.8)
RBC # FLD: 12.5 % — SIGNIFICANT CHANGE UP (ref 10.3–14.5)
SODIUM SERPL-SCNC: 135 MMOL/L — SIGNIFICANT CHANGE UP (ref 135–145)
WBC # BLD: 5.06 K/UL — SIGNIFICANT CHANGE UP (ref 3.8–10.5)
WBC # FLD AUTO: 5.06 K/UL — SIGNIFICANT CHANGE UP (ref 3.8–10.5)

## 2025-07-06 PROCEDURE — 93970 EXTREMITY STUDY: CPT | Mod: 26

## 2025-07-06 PROCEDURE — 99223 1ST HOSP IP/OBS HIGH 75: CPT

## 2025-07-06 PROCEDURE — 75635 CT ANGIO ABDOMINAL ARTERIES: CPT | Mod: 26

## 2025-07-06 RX ORDER — INSULIN GLARGINE-YFGN 100 [IU]/ML
16 INJECTION, SOLUTION SUBCUTANEOUS AT BEDTIME
Refills: 0 | Status: DISCONTINUED | OUTPATIENT
Start: 2025-07-06 | End: 2025-07-09

## 2025-07-06 RX ORDER — INSULIN LISPRO 100 U/ML
6 INJECTION, SOLUTION INTRAVENOUS; SUBCUTANEOUS
Refills: 0 | Status: DISCONTINUED | OUTPATIENT
Start: 2025-07-06 | End: 2025-07-09

## 2025-07-06 RX ORDER — SODIUM CHLORIDE 9 G/1000ML
1000 INJECTION, SOLUTION INTRAVENOUS
Refills: 0 | Status: DISCONTINUED | OUTPATIENT
Start: 2025-07-06 | End: 2025-07-09

## 2025-07-06 RX ORDER — DEXTROSE 50 % IN WATER 50 %
25 SYRINGE (ML) INTRAVENOUS ONCE
Refills: 0 | Status: DISCONTINUED | OUTPATIENT
Start: 2025-07-06 | End: 2025-07-09

## 2025-07-06 RX ORDER — SODIUM CHLORIDE 9 G/1000ML
1000 INJECTION, SOLUTION INTRAVENOUS ONCE
Refills: 0 | Status: COMPLETED | OUTPATIENT
Start: 2025-07-06 | End: 2025-07-06

## 2025-07-06 RX ORDER — INSULIN LISPRO 100 U/ML
6 INJECTION, SOLUTION INTRAVENOUS; SUBCUTANEOUS
Refills: 0 | Status: DISCONTINUED | OUTPATIENT
Start: 2025-07-07 | End: 2025-07-09

## 2025-07-06 RX ORDER — LOSARTAN POTASSIUM 100 MG/1
25 TABLET, FILM COATED ORAL DAILY
Refills: 0 | Status: DISCONTINUED | OUTPATIENT
Start: 2025-07-07 | End: 2025-07-09

## 2025-07-06 RX ORDER — KETOROLAC TROMETHAMINE 30 MG/ML
15 INJECTION, SOLUTION INTRAMUSCULAR; INTRAVENOUS EVERY 6 HOURS
Refills: 0 | Status: DISCONTINUED | OUTPATIENT
Start: 2025-07-06 | End: 2025-07-06

## 2025-07-06 RX ORDER — METOPROLOL SUCCINATE 50 MG/1
50 TABLET, EXTENDED RELEASE ORAL DAILY
Refills: 0 | Status: DISCONTINUED | OUTPATIENT
Start: 2025-07-07 | End: 2025-07-09

## 2025-07-06 RX ORDER — DEXTROSE 50 % IN WATER 50 %
15 SYRINGE (ML) INTRAVENOUS ONCE
Refills: 0 | Status: DISCONTINUED | OUTPATIENT
Start: 2025-07-06 | End: 2025-07-09

## 2025-07-06 RX ORDER — TICAGRELOR 90 MG/1
60 TABLET ORAL EVERY 12 HOURS
Refills: 0 | Status: DISCONTINUED | OUTPATIENT
Start: 2025-07-06 | End: 2025-07-09

## 2025-07-06 RX ORDER — GABAPENTIN 400 MG/1
300 CAPSULE ORAL AT BEDTIME
Refills: 0 | Status: DISCONTINUED | OUTPATIENT
Start: 2025-07-06 | End: 2025-07-09

## 2025-07-06 RX ORDER — ASPIRIN 325 MG
81 TABLET ORAL DAILY
Refills: 0 | Status: DISCONTINUED | OUTPATIENT
Start: 2025-07-06 | End: 2025-07-09

## 2025-07-06 RX ORDER — NALOXEGOL OXALATE 12.5 MG/1
25 TABLET, FILM COATED ORAL DAILY
Refills: 0 | Status: DISCONTINUED | OUTPATIENT
Start: 2025-07-07 | End: 2025-07-09

## 2025-07-06 RX ORDER — DEXTROSE 50 % IN WATER 50 %
12.5 SYRINGE (ML) INTRAVENOUS ONCE
Refills: 0 | Status: DISCONTINUED | OUTPATIENT
Start: 2025-07-06 | End: 2025-07-09

## 2025-07-06 RX ORDER — TAMSULOSIN HYDROCHLORIDE 0.4 MG/1
0.4 CAPSULE ORAL AT BEDTIME
Refills: 0 | Status: DISCONTINUED | OUTPATIENT
Start: 2025-07-06 | End: 2025-07-07

## 2025-07-06 RX ORDER — ROSUVASTATIN CALCIUM 5 MG/1
40 TABLET, FILM COATED ORAL AT BEDTIME
Refills: 0 | Status: DISCONTINUED | OUTPATIENT
Start: 2025-07-06 | End: 2025-07-09

## 2025-07-06 RX ORDER — OXYCODONE HYDROCHLORIDE 30 MG/1
5 TABLET ORAL EVERY 6 HOURS
Refills: 0 | Status: DISCONTINUED | OUTPATIENT
Start: 2025-07-06 | End: 2025-07-06

## 2025-07-06 RX ORDER — INSULIN LISPRO 100 U/ML
INJECTION, SOLUTION INTRAVENOUS; SUBCUTANEOUS
Refills: 0 | Status: DISCONTINUED | OUTPATIENT
Start: 2025-07-06 | End: 2025-07-09

## 2025-07-06 RX ORDER — ACETAMINOPHEN 500 MG/5ML
975 LIQUID (ML) ORAL EVERY 6 HOURS
Refills: 0 | Status: DISCONTINUED | OUTPATIENT
Start: 2025-07-06 | End: 2025-07-09

## 2025-07-06 RX ORDER — GLUCAGON 3 MG/1
1 POWDER NASAL ONCE
Refills: 0 | Status: DISCONTINUED | OUTPATIENT
Start: 2025-07-06 | End: 2025-07-09

## 2025-07-06 RX ADMIN — TAMSULOSIN HYDROCHLORIDE 0.4 MILLIGRAM(S): 0.4 CAPSULE ORAL at 22:12

## 2025-07-06 RX ADMIN — Medication 1000 MILLILITER(S): at 10:36

## 2025-07-06 RX ADMIN — TICAGRELOR 60 MILLIGRAM(S): 90 TABLET ORAL at 17:46

## 2025-07-06 RX ADMIN — INSULIN LISPRO 6 UNIT(S): 100 INJECTION, SOLUTION INTRAVENOUS; SUBCUTANEOUS at 17:04

## 2025-07-06 RX ADMIN — Medication 81 MILLIGRAM(S): at 17:07

## 2025-07-06 RX ADMIN — INSULIN GLARGINE-YFGN 16 UNIT(S): 100 INJECTION, SOLUTION SUBCUTANEOUS at 22:13

## 2025-07-06 RX ADMIN — INSULIN LISPRO 4: 100 INJECTION, SOLUTION INTRAVENOUS; SUBCUTANEOUS at 17:05

## 2025-07-06 RX ADMIN — SODIUM CHLORIDE 1000 MILLILITER(S): 9 INJECTION, SOLUTION INTRAVENOUS at 16:21

## 2025-07-06 RX ADMIN — ROSUVASTATIN CALCIUM 40 MILLIGRAM(S): 5 TABLET, FILM COATED ORAL at 22:12

## 2025-07-06 NOTE — ED PROVIDER NOTE - PROGRESS NOTE
Medication Refill Request    Lisa Romano is requesting a refill of the following medication(s):   Requested Prescriptions     Pending Prescriptions Disp Refills    MOUNJARO 10 MG/0.5ML SOAJ [Pharmacy Med Name: Mounjaro 10 MG/0.5ML Subcutaneous Solution Pen-injector] 4 mL 0     Sig: INJECT 1 PEN SUBCUTANEOUSLY ONCE A WEEK        Listed PCP is Navya Savage MD   Last provider to prescribe medication: Navya Savage MD  Last Date of Medication Prescribed: 03/21/2025   Date of Last Office Visit at [facility]: 04/15/2025   FUTURE APPOINTMENT:   Future Appointments   Date Time Provider Department Center   5/13/2025  2:40 PM Navya Savage MD [facility] E[facility] DEP       Please send refill to:    [facility] [address] [place] [address] -  [phone] - F [phone]  [address] [place] [id number]  Phone: [phone] Fax: [phone]      Please review request and approve or deny with recommendations.     
Stable.

## 2025-07-06 NOTE — ED CDU PROVIDER INITIAL DAY NOTE - OBJECTIVE STATEMENT
64-year-old male with history of hypertension, diabetes, hyperlipidemia, CAD status post stents, PVD, on Brilinta, presents to the ED complaining of left lower extremity pain.  States she has pain that radiates from behind the knee posteriorly up the leg to the buttock and groin.  Describes the pain as a pulling sharp pain . No associated back pain.  States pain worsens when he ambulates and improves with rest.  Denies associated numbness or weakness of the leg.  Patient had a recent angioplasty of the left anterior tibial artery 2 weeks ago.  Patient was instructed to go to the ED by vascular for further evaluation.  Patient denies recent injuries, falls, heavy lifting, or new exercises.  Denies fever, chills, chest pain, shortness of breath, abdominal pain, nausea, vomiting, diarrhea.  No saddle anesthesia or urinary retention/incontinence. 64-year-old male with history of hypertension, diabetes, hyperlipidemia, CAD status post stents, PVD, on Brilinta, presents to the ED complaining of left lower extremity pain.  States she has pain that radiates from behind the knee posteriorly up the leg to the buttock and groin.  Describes the pain as a pulling sharp pain . No associated back pain.  States pain worsens when he ambulates and improves with rest.  Denies associated numbness or weakness of the leg.  Patient had a recent angioplasty of the left anterior tibial artery 2 weeks ago.  Patient was instructed to go to the ED by his cardiologist for further evaluation.  On exam pt has palpable pulses, warm feet bilaterally and CTA does not reveal any significant occlusions. Patient denies recent injuries, falls or other trauma, heavy lifting, or new exercises.  Pt has chronic back pain for which he is on oxycodone. Pt denies bowel or bladder changes, denies numbness or weakness, denies inability to ambulate. Pt denies fever, chills, chest pain, shortness of breath, abdominal pain, nausea, vomiting, diarrhea.  No saddle anesthesia or urinary retention/incontinence. In the ED his A1C was found to be 12, he was formerly on Ozempic which he states he ran out of and lost a significant amount of weight while taking. Pt states he was given Jardiance but had UTI's while on the medication, pt states he is currently taking Metformin for DM but states he has not been taking it every day. Pt was sent to CDU for endocrinology consultation, glycemic control, reassessment.  Pt states Pt denies any other sx or acute complaints.

## 2025-07-06 NOTE — ED PROVIDER NOTE - PHYSICAL EXAMINATION
GEN: NAD, awake, eyes open spontaneously  EYES: normal conjunctiva  ENT: NCAT, MMM, Trachea midline  CHEST/LUNGS: Non-tachypneic, CTAB, bilateral breath sounds  CARDIAC: Non-tachycardic, normal perfusion  ABDOMEN: Soft, NTND, No rebound/guarding  MSK: No edema, no gross deformity of extremities. Non-palpable DP pulses b/l. No tenderness of lumbar spine or paraspinal areas. No buttock/pelvis/proximal LLE tenderness.   SKIN: No rashes, no petechiae, no vesicles  NEURO: CN grossly intact, no focal motor or sensory deficits  PSYCH: Alert, appropriate, cooperative, with capacity and insight

## 2025-07-06 NOTE — ED PROVIDER NOTE - ATTENDING CONTRIBUTION TO CARE
DR. BLOCH, ATTENDING MD-  I performed a face to face bedside interview with patient regarding history of present illness, review of symptoms and past medical history. I completed an independent physical exam.  I have discussed patient's plan of care with the resident.  Patient describes pain in his groin area on exertion has peripheral vascular disease had a balloon of the left lower leg unable to do the popliteal also with history of diabetes fingerstick in the 300s on exam well-appearing no acute distress heart sounds normal lungs clear abdomen soft nontender negative straight leg raise pulses nonpalpable DP but faint dopplerable pulses extremities warm with good capillary refill.  No skin changes.  Will evaluate diabetes CT angio both legs with runoff as per cardiologist.  And reassess

## 2025-07-06 NOTE — ED ADULT NURSE NOTE - TEMPLATE
I called the patient and told him that he needs to have cardiac catheterization to decide whether he needs surgical aortic valve replacement or it could be done by [facility].  [facility] does not have the ability to do either of those procedures and hence he prefers to get it done here at [facility] under my care.  Patient wants to celebrate the Spiritism holiday of age tomorrow and will get back to us to arrange the cardiac catheterization.  I left my cell number with him. General

## 2025-07-06 NOTE — ED PROVIDER NOTE - CLINICAL SUMMARY MEDICAL DECISION MAKING FREE TEXT BOX
64-year-old male with history of peripheral vascular disease status post recent left tibial artery angioplasty with balloon, on Brilinta, presenting with left lower extremity pain.  On exam no palpable DP pulses bilaterally however pulses appreciated with the Doppler.  Extremity is warm and nontender.  No obvious deformities.  Differential includes claudication vs radiculopathy vs muscle strain.  Vascular evaluated at bedside on arrival.  Recommending CTA of the lower extremities with runoff.  Patient also noted to have elevated blood glucose on arrival.  States he takes Ozempic for diabetes and had a donut this morning.  Will check basic labs including blood gas, BHB, A1c.  Will hydrate with fluids and reassess.

## 2025-07-06 NOTE — ED ADULT NURSE NOTE - NS PRO PASSIVE SMOKE EXP
The pt to be discharged via wheelchair with  to personal car by staff, discharge instructions reviewed, written instructions taken by , vss, safety maintained at all times
Unknown

## 2025-07-06 NOTE — ED ADULT NURSE NOTE - OBJECTIVE STATEMENT
Pt c/o left knee pain and elevated blood sugars, reports he was told he may have a blood clot in the left leg. Denies sob, chest pain, fever, chills, n/v. a&ox4, ambulatory at baseline, skin intact, respirations even and unlabored, abd soft and nondistended nontender to palpation. 20g iv placed in left arm labs drawn and sent, medicated as per ordered.

## 2025-07-06 NOTE — ED ADULT NURSE REASSESSMENT NOTE - NS ED NURSE REASSESS COMMENT FT1
Pt in NAD, VSS, will continue to monitor.
Report handed off to CDU RN, pt to be transported to cdu bed 5.
Break RN: Pt received in room 5. Pt resting comfortably. pt offered no complains at present. respiration even and non-labored. in NAD. Plan of care ongoing
Pt denies any pain, SOB, no distress noted, plan is blood glucose monitoring and dm teaching, call bell within reach, safety maintained, will continue to monitor
received patient to CDU# 5 ambulatory at base line, alert ox4 came in c/i LLE pain. h/o L tibial artery angioplasty in mid June, denies any pain now. denies CP/SOB. respirations even and unlabored. CAD ,  s/p stents on Brilinta , endorses to taking med daily. skin warm and dry. + popliteal. + pedal pulse noted. meds given as ordered. no swelling or redness to the extremities noted. call bell provided. will reassess. safety maintained.

## 2025-07-06 NOTE — CONSULT NOTE ADULT - SUBJECTIVE AND OBJECTIVE BOX
C A R D I O L O G Y  *********************    DATE OF SERVICE: 07-06-25    HISTORY OF PRESENT ILLNESS: HPI: 64 year old male with PMH HTN, DM, HLD, hx cerical decompression surgery 4/2024, with recurrent syncopal episodesCAD s/p PCI to LAD and LCX March 2025 with known PAD with recent distal left AT intervention with known proximal occluded AT presents with left leg pain and lower back pain. PAtients wife called office last night states patient having worsening left leg pain worse with walking. SHe reports leg was cold, patient instructed to come to ER for concern for ischemic leg. IN ED leg warm and patient with dopplarable AT pulse on left and right. Now being worked up for sciatica and elevated glucose        PAST MEDICAL & SURGICAL HISTORY:  Diabetes mellitus type II, diagnosed in 2018  Hypertension  Snoring MACEY precautions -- responds affirmatively to STOP BANG questionnaire  Enlarged prostate  H/O phimosis 2019  Arthropathy right shoulder -- never worked up  H/O cholelithiasis s/p lap cholecystectomy  Enlarged prostate negatie biopsy  History of back surgery  S/P left rotator cuff repair  S/P right rotator cuff repair  HTN  DM  HLD  CAD s/p PCI to LAD and LCX      MEDICATIONS:  Amlodipine 2.5 mg  ASA 81 mg  Brilinta 90 mg BID  Gabapentin 300 mg dialy  Losartan 25 mg  Toprol 50 mg  Ozempic  Crestor 40 mg      AllergiesNo Known Allergies      FAMILY HISTORY:  FH: diabetes mellitus  father and brother      SOCIAL HISTORY:    [ ] Non-smoker  [ ] Smoker  [ ] Alcohol    FLU VACCINE THIS YEAR STARTS IN AUGUST:  [ ] Yes    [ ] No    IF OVER 65 HAVE YOU EVER HAD A PNA VACCINE:  [ ] Yes    [ ] No       [ ] N/A      REVIEW OF SYSTEMS:  [ ]chest pain  [  ]shortness of breath  [  ]palpitations  [  ]syncope  [ ]near syncope [ ]upper extremity weakness   [ ] lower extremity weakness  [  ]diplopia  [  ]altered mental status   [  ]fevers  [ ]chills [ ]nausea  [ ]vomiting  [  ]dysphagia    [ ]abdominal pain  [ ]melena  [ ]BRBPR    [  ]epistaxis  [  ]rash    [ ]lower extremity edema        [X] All others negative	  [ ] Unable to obtain      LABS:	 	    CARDIAC MARKERS:                              13.4   5.06  )-----------( 199      ( 06 Jul 2025 10:38 )             39.7     Hb Trend: 13.4<--    07-06    135  |  101  |  22  ----------------------------<  339[H]  4.5   |  22  |  1.35[H]    Ca    9.0      06 Jul 2025 10:38    TPro  6.8  /  Alb  3.6  /  TBili  0.2  /  DBili  x   /  AST  16  /  ALT  20  /  AlkPhos  117  07-06    Creatinine Trend: 1.35<--    Coags:      proBNP:   Lipid Profile:   HgA1c:   TSH:         PHYSICAL EXAM:  T(C): 36.6 (07-06-25 @ 11:36), Max: 36.6 (07-06-25 @ 09:28)  HR: 72 (07-06-25 @ 11:36) (72 - 94)  BP: 130/90 (07-06-25 @ 11:36) (130/90 - 131/78)  RR: 18 (07-06-25 @ 11:36) (18 - 18)  SpO2: 100% (07-06-25 @ 11:36) (95% - 100%)  Wt(kg): --     I&O's Summary      General:  Alert and Oriented * 3.   Head: Normocephalic and atraumatic.   Neck: No JVD. No bruits. Supple. Does not appear to be enlarged.   Cardiovascular: + S1,S2 ; RRR Soft systolic murmur at the left lower sternal border. No rubs noted.    Lungs: CTA b/l. No rhonchi, rales or wheezes.   Abdomen: + BS, soft. Non tender. Non distended. No rebound. No guarding.   Extremities: dopplarable left and right AT pulses  Neurologic: Moves all four extremities. Full range of motion.   Skin: Warm and moist. The patient's skin has normal elasticity and good skin turgor.   Psychiatric: Appropriate mood and affect.  Musculoskeletal: Normal range of motion, normal strength       ASSESSMENT/PLAN: 	64 year old male with PMH HTN, DM, HLD, hx cerical decompression surgery 4/2024, with recurrent syncopal episodes CAD s/p PCI to LAD and LCX March 2025 with known PAD with recent distal left AT intervention with known proximal occluded AT presents with left leg pain and lower back pain. Patients wife called office last night states patient having worsening left leg pain worse with walking. SHE reports leg was cold, patient instructed to come to ER for concern for ischemic leg. IN ED leg warm and patient with dopplarable AT pulse on left and right. Now being worked up for sciatica and elevated glucose    PAD/CAD with recent PCI to LAD and LCX  -  known PAD with recent distal left AT intervention with known proximal occluded AT  -  leg warm and patient with dopplarable AT pulse on left and right. Now being worked up for sciatica and elevated glucose  - check CTA b/l lower extremities with runoff  - c/w ASA< Statin, Brilinta for recent PCI  - c/w losartan and BB    Jose Manuel Diaz MD  Pager: [phone]  Office: [phone]

## 2025-07-06 NOTE — ED PROVIDER NOTE - PROGRESS NOTE DETAILS
Daria Baird DO (PGY-2): CTA of the lower extremities with no acute findings.  Iliac vessels are patent.  Discussed results with patient and wife.  Glucose elevated  in the 300s with a A1c of 12.0.  Patient agreeable to stay in the CDU for glucose control and Endo consult.

## 2025-07-06 NOTE — ED ADULT TRIAGE NOTE - CHIEF COMPLAINT QUOTE
Pt. c/o pain behind left knee radiating up leg into groin since last night. States he has "clogged veins in his legs" and told to come r/o DVT. hx of DM, cardiac stents, on anticoagulant

## 2025-07-06 NOTE — ED CDU PROVIDER INITIAL DAY NOTE - MUSCULOSKELETAL, MLM
Spine appears normal, range of motion is not limited, no muscle or joint tenderness. 5/5 strength and intact sensation bilaterally in upper and lower extremity. Palpable dorsal pulses bilaterally, cap refill is <2 seconds.

## 2025-07-06 NOTE — ED PROVIDER NOTE - OBJECTIVE STATEMENT
64-year-old male with history of hypertension, diabetes, hyperlipidemia, CAD status post stents, PVD, on Brilinta, presents to the ED complaining of left lower extremity pain.  States she has pain that radiates from behind the knee posteriorly up the leg to the buttock and groin.  Describes the pain as a pulling sharp pain . No associated back pain.  States pain worsens when he ambulates and improves with rest.  Denies associated numbness or weakness of the leg.  Patient had a recent angioplasty of the left anterior tibial artery 2 weeks ago.  Patient was instructed to go to the ED by vascular for further evaluation.  Patient denies recent injuries, falls, heavy lifting, or new exercises.  Denies fever, chills, chest pain, shortness of breath, abdominal pain, nausea, vomiting, diarrhea.  No saddle anesthesia or urinary retention/incontinence.

## 2025-07-07 VITALS
TEMPERATURE: 98 F | DIASTOLIC BLOOD PRESSURE: 68 MMHG | OXYGEN SATURATION: 100 % | RESPIRATION RATE: 18 BRPM | HEART RATE: 72 BPM | SYSTOLIC BLOOD PRESSURE: 107 MMHG

## 2025-07-07 DIAGNOSIS — I10 ESSENTIAL (PRIMARY) HYPERTENSION: ICD-10-CM

## 2025-07-07 DIAGNOSIS — E11.65 TYPE 2 DIABETES MELLITUS WITH HYPERGLYCEMIA: ICD-10-CM

## 2025-07-07 DIAGNOSIS — E78.49 OTHER HYPERLIPIDEMIA: ICD-10-CM

## 2025-07-07 LAB
ANION GAP SERPL CALC-SCNC: 11 MMOL/L — SIGNIFICANT CHANGE UP (ref 7–14)
BASOPHILS # BLD AUTO: 0.02 K/UL — SIGNIFICANT CHANGE UP (ref 0–0.2)
BASOPHILS NFR BLD AUTO: 0.5 % — SIGNIFICANT CHANGE UP (ref 0–2)
BUN SERPL-MCNC: 19 MG/DL — SIGNIFICANT CHANGE UP (ref 7–23)
CALCIUM SERPL-MCNC: 8.9 MG/DL — SIGNIFICANT CHANGE UP (ref 8.4–10.5)
CHLORIDE SERPL-SCNC: 105 MMOL/L — SIGNIFICANT CHANGE UP (ref 98–107)
CO2 SERPL-SCNC: 21 MMOL/L — LOW (ref 22–31)
CREAT SERPL-MCNC: 1.16 MG/DL — SIGNIFICANT CHANGE UP (ref 0.5–1.3)
EGFR: 71 ML/MIN/1.73M2 — SIGNIFICANT CHANGE UP
EGFR: 71 ML/MIN/1.73M2 — SIGNIFICANT CHANGE UP
EOSINOPHIL # BLD AUTO: 0.28 K/UL — SIGNIFICANT CHANGE UP (ref 0–0.5)
EOSINOPHIL NFR BLD AUTO: 6.7 % — HIGH (ref 0–6)
GLUCOSE SERPL-MCNC: 125 MG/DL — HIGH (ref 70–99)
HCT VFR BLD CALC: 35.7 % — LOW (ref 39–50)
HGB BLD-MCNC: 12.7 G/DL — LOW (ref 13–17)
IMM GRANULOCYTES # BLD AUTO: 0.01 K/UL — SIGNIFICANT CHANGE UP (ref 0–0.07)
IMM GRANULOCYTES NFR BLD AUTO: 0.2 % — SIGNIFICANT CHANGE UP (ref 0–0.9)
LYMPHOCYTES # BLD AUTO: 1.37 K/UL — SIGNIFICANT CHANGE UP (ref 1–3.3)
LYMPHOCYTES NFR BLD AUTO: 33 % — SIGNIFICANT CHANGE UP (ref 13–44)
MCHC RBC-ENTMCNC: 30.2 PG — SIGNIFICANT CHANGE UP (ref 27–34)
MCHC RBC-ENTMCNC: 35.6 G/DL — SIGNIFICANT CHANGE UP (ref 32–36)
MCV RBC AUTO: 85 FL — SIGNIFICANT CHANGE UP (ref 80–100)
MONOCYTES # BLD AUTO: 0.36 K/UL — SIGNIFICANT CHANGE UP (ref 0–0.9)
MONOCYTES NFR BLD AUTO: 8.7 % — SIGNIFICANT CHANGE UP (ref 2–14)
NEUTROPHILS # BLD AUTO: 2.11 K/UL — SIGNIFICANT CHANGE UP (ref 1.8–7.4)
NEUTROPHILS NFR BLD AUTO: 50.9 % — SIGNIFICANT CHANGE UP (ref 43–77)
NRBC # BLD AUTO: 0 K/UL — SIGNIFICANT CHANGE UP (ref 0–0)
NRBC # FLD: 0 K/UL — SIGNIFICANT CHANGE UP (ref 0–0)
NRBC BLD AUTO-RTO: 0 /100 WBCS — SIGNIFICANT CHANGE UP (ref 0–0)
PLATELET # BLD AUTO: 182 K/UL — SIGNIFICANT CHANGE UP (ref 150–400)
PMV BLD: 9.2 FL — SIGNIFICANT CHANGE UP (ref 7–13)
POTASSIUM SERPL-MCNC: 3.9 MMOL/L — SIGNIFICANT CHANGE UP (ref 3.5–5.3)
POTASSIUM SERPL-SCNC: 3.9 MMOL/L — SIGNIFICANT CHANGE UP (ref 3.5–5.3)
RBC # BLD: 4.2 M/UL — SIGNIFICANT CHANGE UP (ref 4.2–5.8)
RBC # FLD: 12.6 % — SIGNIFICANT CHANGE UP (ref 10.3–14.5)
SODIUM SERPL-SCNC: 137 MMOL/L — SIGNIFICANT CHANGE UP (ref 135–145)
WBC # BLD: 4.15 K/UL — SIGNIFICANT CHANGE UP (ref 3.8–10.5)
WBC # FLD AUTO: 4.15 K/UL — SIGNIFICANT CHANGE UP (ref 3.8–10.5)

## 2025-07-07 PROCEDURE — 99239 HOSP IP/OBS DSCHRG MGMT >30: CPT

## 2025-07-07 PROCEDURE — 99205 OFFICE O/P NEW HI 60 MIN: CPT

## 2025-07-07 RX ORDER — SEMAGLUTIDE 1 MG/.5ML
0.25 INJECTION, SOLUTION SUBCUTANEOUS
Qty: 1 | Refills: 0
Start: 2025-07-07

## 2025-07-07 RX ORDER — ISOPROPYL ALCOHOL, BENZOCAINE .7; .06 ML/ML; ML/ML
SWAB TOPICAL
Qty: 1 | Refills: 0
Start: 2025-07-07

## 2025-07-07 RX ORDER — INSULIN GLARGINE-YFGN 100 [IU]/ML
16 INJECTION, SOLUTION SUBCUTANEOUS
Qty: 1 | Refills: 0
Start: 2025-07-07

## 2025-07-07 RX ORDER — SEMAGLUTIDE 1 MG/.5ML
0.25 INJECTION, SOLUTION SUBCUTANEOUS
Qty: 4 | Refills: 0
Start: 2025-07-07

## 2025-07-07 RX ORDER — TAMSULOSIN HYDROCHLORIDE 0.4 MG/1
0.4 CAPSULE ORAL ONCE
Refills: 0 | Status: COMPLETED | OUTPATIENT
Start: 2025-07-07 | End: 2025-07-07

## 2025-07-07 RX ORDER — METFORMIN HYDROCHLORIDE 500 MG/1
1 TABLET ORAL
Qty: 30 | Refills: 0
Start: 2025-07-07 | End: 2025-08-05

## 2025-07-07 RX ADMIN — TICAGRELOR 60 MILLIGRAM(S): 90 TABLET ORAL at 06:18

## 2025-07-07 RX ADMIN — NALOXEGOL OXALATE 25 MILLIGRAM(S): 12.5 TABLET, FILM COATED ORAL at 12:28

## 2025-07-07 RX ADMIN — Medication 81 MILLIGRAM(S): at 12:28

## 2025-07-07 RX ADMIN — INSULIN LISPRO 2: 100 INJECTION, SOLUTION INTRAVENOUS; SUBCUTANEOUS at 11:47

## 2025-07-07 RX ADMIN — METOPROLOL SUCCINATE 50 MILLIGRAM(S): 50 TABLET, EXTENDED RELEASE ORAL at 06:18

## 2025-07-07 RX ADMIN — INSULIN LISPRO 6 UNIT(S): 100 INJECTION, SOLUTION INTRAVENOUS; SUBCUTANEOUS at 11:47

## 2025-07-07 RX ADMIN — TAMSULOSIN HYDROCHLORIDE 0.4 MILLIGRAM(S): 0.4 CAPSULE ORAL at 07:34

## 2025-07-07 RX ADMIN — INSULIN LISPRO 6 UNIT(S): 100 INJECTION, SOLUTION INTRAVENOUS; SUBCUTANEOUS at 08:01

## 2025-07-07 RX ADMIN — LOSARTAN POTASSIUM 25 MILLIGRAM(S): 100 TABLET, FILM COATED ORAL at 06:18

## 2025-07-07 NOTE — ED CDU PROVIDER DISPOSITION NOTE - NSFOLLOWUPINSTRUCTIONS_ED_ALL_ED_FT
bilateral ureteral stents
REST, NO STRENUOUS ACTIVITY  CHECK BLOOG SUGAR 3 TIMES DAILY AS DIRECTED  TAKE LANTUS 16 UNITS AT BEDTIME  OZEMPIC .25MG ONCE A WEEK  METFORMIN 500MG ONCE A DAY  PLEASE FOLLOW UP WITH YOUR REGULAR DOCTOR AS WELL AS ENDOCRINOLOGIST  [facility] ENDOCRINOLOGY [phone]  RETURN TO ER FOR WORSENING SYMPTOMS         Type 2 Diabetes Management for Adults    WHAT YOU NEED TO KNOW:  Type 2 diabetes is a disease that affects how your body uses glucose (sugar). Normally, when the blood sugar level increases, the pancreas makes more insulin. Insulin helps move sugar out of the blood so it can be used for energy. Type 2 diabetes develops because either the body cannot make enough insulin, or it cannot use the insulin correctly. Type 2 diabetes can be controlled to prevent damage to your heart, blood vessels, and other organs.  Pancreas    DISCHARGE INSTRUCTIONS:  What you can do to manage your blood sugar levels:  Make healthy food choices. Healthy foods can give you energy to learn and be active. Healthy foods can also help you keep your blood sugar in balance, and manage or lose weight safely. Work with a dietitian to develop a meal plan that works for you and your schedule. A dietitian can help you learn how to eat the right amount of carbohydrates during your meals and snacks. Carbohydrates can raise your blood sugar if you eat too many at one time. Some foods that contain carbohydrates include breads, cereals, rice, pasta, and sweets.  Make healthy beverage choices. Drink water. Decrease the amount of drinks with sugar substitutes you have, such as diet sodas. Avoid sugar-sweetened drinks, such as regular sodas and fruit juice.  Get regular physical activity. Physical activity helps to lower your blood sugar levels. It can also help you manage your weight. Get at least 150 minutes of moderate to vigorous aerobic physical activity each week. Do not miss more than 2 days in a row. Do not sit longer than 30 minutes at a time. Your healthcare provider can help you create an activity plan. The plan can include the best activities for you and can help you build your strength and endurance.  Walking for Exercise  Maintain a healthy weight. Ask your healthcare provider how much you should weigh. Ask him or her to help you create a safe weight loss plan if you are overweight. Weight loss can improve your blood sugar levels.  Check your blood sugar level as directed and as needed. Ask your healthcare provider what your blood sugar levels should be.  Look at your schedule and make a plan for when you will check your blood sugar levels throughout the day.  Check more often if you think your blood sugar is too high or too low. This will allow you to take care of any low or high blood sugar levels so they do not interfere with your activities.  Rotate the sites where you do fingersticks. This will help make the checks less painful, and make fingerstick sites less noticeable.  Write down your blood sugar levels so you can show them to your healthcare provider during your visits. Talk to your healthcare provider if you are having trouble keeping your blood sugar at the recommended levels.  Take your diabetes medicine or insulin as directed. You may need diabetes medicine, insulin, or both to help control your blood sugar levels. Your healthcare provider will teach you how and when to take your diabetes medicine or insulin.  What you need to know about high blood sugar: High blood sugar may not cause any symptoms. It may cause you to feel more thirsty than usual or urinate more often than usual. Over time, high blood sugar levels can damage your nerves, blood vessels, tissues, and organs.  Large meals or large amounts of carbohydrates at one time can raise your blood sugar.  Decreased physical activity can raise your blood sugar. For example, your blood sugar can increase if you stop playing a sport or getting regular physical activity. Do not sit for longer than 90 minutes at a time.  Stress can raise your blood sugar. Ask your healthcare provider for help if you are having trouble managing stress.  Illness can raise your blood sugar. This can happen even if you eat less than usual while you are sick. Work with your healthcare provider to develop a sick day plan. This is a plan that helps you manage your blood sugar levels while you are sick.  A lower dose of medicine or insulin, or a late dose, can raise your blood sugar. There is not enough time for your medicine or insulin to work as it should if you take it late. When you take a lower dose, there is not enough medicine or insulin needed to lower your blood sugar.  What you need to know about low blood sugar: Be aware of low blood sugar symptoms if you use insulin. You can prevent symptoms such as shakiness, dizziness, irritability, or confusion by preventing low blood sugar.  Treat low blood sugar right away. Eat 15 grams of carbohydrate, such as 4 ounces of juice or 1 tube of glucose gel. Check your blood sugar again 10 to 15 minutes later. When your blood sugar goes back to normal, eat a meal or snack to prevent another decrease in blood sugar.    Ways to Raise Your Blood Sugar  Your blood sugar can get too low if you take diabetes medicine or insulin and do not eat enough food. It can also happen if you skip a meal or snack.  Increased physical activity can cause low blood sugar. If you use insulin, check your blood sugar before you exercise. If your blood sugar is below 100 mg/dL, eat 15 grams of carbohydrate. If you will exercise for more than 1 hour, check your blood sugar every 30 minutes. You may need to adjust your insulin before exercise. You may need a carbohydrate snack during or after exercise.  Other things you can do to manage your diabetes:    Wear medical alert jewelry or carry a card that says you have diabetes. Ask where to get these items.  Medical Alert Jewelry  Be safe when you drive. Check your blood sugar before you drive if you use insulin, and you think your blood sugar is low. If your blood sugar is low, eat 15 grams of carbohydrate and wait for your blood sugar to go back to normal. Keep snacks that contain carbohydrate in the car. If you feel like your blood sugar is low while you are driving, pull over and check your blood sugar level. Treat low blood sugar before you start driving again, if needed.  Know the risks if you choose to drink alcohol. Alcohol can cause your blood sugar levels to be low if you use insulin. Alcohol can cause high blood sugar levels and weight gain if you drink too much. Women should limit alcohol to 1 drink a day. Men should limit alcohol to 2 drinks a day. A drink of alcohol is 12 ounces of beer, 5 ounces of wine, or 1½ ounces of liquor.  Do not smoke. Nicotine can damage blood vessels and make it more difficult to manage your diabetes. Do not use e-cigarettes or smokeless tobacco in place of cigarettes or to help you quit. They still contain nicotine. Ask your healthcare provider for information if you currently smoke and need help quitting.  Have screenings for complications of diabetes and other conditions that happen with diabetes. You will need to be screened for kidney problems, high cholesterol, high blood pressure, blood vessel problems, eye problems, and eating disorders. Some screenings may begin right away and some may happen within the first 5 years of diagnosis. You will need to continue screenings through your lifetime. Keep your follow-up appointments with all providers.  Ask about vaccines. You have a higher risk for serious illness if you get the flu, pneumonia, or hepatitis. Ask your healthcare provider if you should get a flu, pneumonia, or hepatitis B vaccine, and when to get the vaccine.  Follow up with your healthcare provider as directed: You may need to return to have your A1c every 3 months. An A1c test shows the average amount of sugar in your blood over the past 2 to 3 months. Your healthcare provider will tell you what your A1c level should be.    Meal Planning with the Plate Method    WHAT YOU NEED TO KNOW:    Meal planning with the plate method is a simple way for people with diabetes to plan meals. This method can help you to eat the right amount of carbohydrates and keep your blood sugar levels under control. Carbohydrates naturally raise your blood sugar after eating. Your blood sugar can rise to a high level if you eat too much carbohydrate at one time. Carbohydrates are found in starches (bread, cereal, starchy vegetables, and beans), fruit, milk, yogurt, and sweets.    DISCHARGE INSTRUCTIONS:    How to use the plate method to plan meals:    Draw an imaginary line down the middle of a 9-inch dinner plate. On one side, draw another line to divide that section in half. Your plate will have 3 sections.    Fill the largest section of your plate with non-starchy vegetables. These include broccoli, spinach, cucumbers, peppers, cauliflower, and tomatoes.    Add a starch to 1 of the small sections of your plate. Starches include pasta, rice, whole-grain bread, tortillas, corn, potatoes, and beans.    Add meat or another source of protein to the other small section of your plate. Examples include chicken or turkey without skin, fish, lean beef or pork, low-fat cheese, tofu, or eggs.    Add dairy or fruit to the side of your plate if your meal plan allows. Examples of dairy include skim or 1% milk or low-fat yogurt. If you do not drink milk, you may be able to add another serving of starchy food instead.    Add a low-calorie or calorie-free drink such as water or unsweetened tea or coffee.  Plate Method  Serving sizes of foods:    Non-starchy vegetables:  ½ cup of cooked vegetables or 1 cup of raw vegetables    ½ cup of vegetable juice    Starches:  1 ounce of whole-wheat bread or 1 small (6 inch) flour or corn tortilla    1 small (4 inch) pancake (about ¼ inch thick)    ¾ cup of dry, unsweetened, whole-grain ready-to-eat cereal or ¼ cup of low-fat granola    ½ cup of cooked cereal or oatmeal    ? cup of rice or pasta    ½ cup of corn, green peas, sweet potatoes, or mashed potatoes    ½ cup of cooked beans and peas (garbanzo, kulkarni, kidney, white, split, black-eyed)    Meat and other protein sources:  3 to 4 ounces of any lean meat, fish, or poultry    ½ cup of tofu or tempeh    1 large egg    1½ ounces (about 2 tablespoons) of nuts or 2 tablespoons of peanut butter    Fruit:  1 small piece of fresh fruit    ½ cup of canned or fresh fruit or unsweetened fruit juice    ¼ cup of dried fruit    Milk and yogurt:  1 cup (8 ounces) of skim or 1% milk    ¾ cup (6 ounces) of plain, non-fat yogurt  Other guidelines to follow:    Limit salt and sugar. Choose and prepare foods and drinks with less salt and added sugars. Use the nutrition information on food labels to help you make healthy choices. The percent daily value listed on the food label tells you whether a food is low or high in certain nutrients. A percent daily value of 5% or less means that the food is low in a nutrient. A percent daily value of 20% or more means that the food is high in a nutrient.    Choose healthy fats. Choose healthy fats such as polyunsaturated and monounsaturated fats in place of unhealthy fats. Healthy fats are found in vegetable oils such as soybean, corn, canola, olive, and sunflower oil. Unhealthy fats are saturated fats, trans fats, and cholesterol. Unhealthy fats are found in shortening, butter, stick margarine, and animal fat.    Ask your healthcare provider if alcohol is safe for you and how much is safe for you. If you choose to drink alcohol, drink it with meals. When you drink alcohol on an empty stomach, your blood sugar may fall to a low level.    Type 2 Diabetes Management for Adults    WHAT YOU NEED TO KNOW:    Type 2 diabetes is a disease that affects how your body uses glucose (sugar). Normally, when the blood sugar level increases, the pancreas makes more insulin. Insulin helps move sugar out of the blood so it can be used for energy. Type 2 diabetes develops because either the body cannot make enough insulin, or it cannot use the insulin correctly. Type 2 diabetes can be controlled to prevent damage to your heart, blood vessels, and other organs.  Pancreas    DISCHARGE INSTRUCTIONS:    What you can do to manage your blood sugar levels:    Make healthy food choices. Healthy foods can give you energy to learn and be active. Healthy foods can also help you keep your blood sugar in balance, and manage or lose weight safely. Work with a dietitian to develop a meal plan that works for you and your schedule. A dietitian can help you learn how to eat the right amount of carbohydrates during your meals and snacks. Carbohydrates can raise your blood sugar if you eat too many at one time. Some foods that contain carbohydrates include breads, cereals, rice, pasta, and sweets.    Make healthy beverage choices. Drink water. Decrease the amount of drinks with sugar substitutes you have, such as diet sodas. Avoid sugar-sweetened drinks, such as regular sodas and fruit juice.    Get regular physical activity. Physical activity helps to lower your blood sugar levels. It can also help you manage your weight. Get at least 150 minutes of moderate to vigorous aerobic physical activity each week. Do not miss more than 2 days in a row. Do not sit longer than 30 minutes at a time. Your healthcare provider can help you create an activity plan. The plan can include the best activities for you and can help you build your strength and endurance.  Walking for Exercise      Maintain a healthy weight. Ask your healthcare provider how much you should weigh. Ask him or her to help you create a safe weight loss plan if you are overweight. Weight loss can improve your blood sugar levels.    Check your blood sugar level as directed and as needed. Ask your healthcare provider what your blood sugar levels should be.  Look at your schedule and make a plan for when you will check your blood sugar levels throughout the day.    Check more often if you think your blood sugar is too high or too low. This will allow you to take care of any low or high blood sugar levels so they do not interfere with your activities.    Rotate the sites where you do fingersticks. This will help make the checks less painful, and make fingerstick sites less noticeable.    Write down your blood sugar levels so you can show them to your healthcare provider during your visits. Talk to your healthcare provider if you are having trouble keeping your blood sugar at the recommended levels.    Take your diabetes medicine or insulin as directed. You may need diabetes medicine, insulin, or both to help control your blood sugar levels. Your healthcare provider will teach you how and when to take your diabetes medicine or insulin.  What you need to know about high blood sugar: High blood sugar may not cause any symptoms. It may cause you to feel more thirsty than usual or urinate more often than usual. Over time, high blood sugar levels can damage your nerves, blood vessels, tissues, and organs.    Large meals or large amounts of carbohydrates at one time can raise your blood sugar.    Decreased physical activity can raise your blood sugar. For example, your blood sugar can increase if you stop playing a sport or getting regular physical activity. Do not sit for longer than 90 minutes at a time.    Stress can raise your blood sugar. Ask your healthcare provider for help if you are having trouble managing stress.    Illness can raise your blood sugar. This can happen even if you eat less than usual while you are sick. Work with your healthcare provider to develop a sick day plan. This is a plan that helps you manage your blood sugar levels while you are sick.    A lower dose of medicine or insulin, or a late dose, can raise your blood sugar. There is not enough time for your medicine or insulin to work as it should if you take it late. When you take a lower dose, there is not enough medicine or insulin needed to lower your blood sugar.  What you need to know about low blood sugar: Be aware of low blood sugar symptoms if you use insulin. You can prevent symptoms such as shakiness, dizziness, irritability, or confusion by preventing low blood sugar.    Treat low blood sugar right away. Eat 15 grams of carbohydrate, such as 4 ounces of juice or 1 tube of glucose gel. Check your blood sugar again 10 to 15 minutes later. When your blood sugar goes back to normal, eat a meal or snack to prevent another decrease in blood sugar.    Ways to Raise Your Blood Sugar      Your blood sugar can get too low if you take diabetes medicine or insulin and do not eat enough food. It can also happen if you skip a meal or snack.    Increased physical activity can cause low blood sugar. If you use insulin, check your blood sugar before you exercise. If your blood sugar is below 100 mg/dL, eat 15 grams of carbohydrate. If you will exercise for more than 1 hour, check your blood sugar every 30 minutes. You may need to adjust your insulin before exercise. You may need a carbohydrate snack during or after exercise.  Other things you can do to manage your diabetes:    Wear medical alert jewelry or carry a card that says you have diabetes. Ask where to get these items.  Medical Alert Jewelry      Be safe when you drive. Check your blood sugar before you drive if you use insulin, and you think your blood sugar is low. If your blood sugar is low, eat 15 grams of carbohydrate and wait for your blood sugar to go back to normal. Keep snacks that contain carbohydrate in the car. If you feel like your blood sugar is low while you are driving, pull over and check your blood sugar level. Treat low blood sugar before you start driving again, if needed.  Know the risks if you choose to drink alcohol. Alcohol can cause your blood sugar levels to be low if you use insulin. Alcohol can cause high blood sugar levels and weight gain if you drink too much. Women should limit alcohol to 1 drink a day. Men should limit alcohol to 2 drinks a day. A drink of alcohol is 12 ounces of beer, 5 ounces of wine, or 1½ ounces of liquor.  Do not smoke. Nicotine can damage blood vessels and make it more difficult to manage your diabetes. Do not use e-cigarettes or smokeless tobacco in place of cigarettes or to help you quit. They still contain nicotine. Ask your healthcare provider for information if you currently smoke and need help quitting.  Have screenings for complications of diabetes and other conditions that happen with diabetes. You will need to be screened for kidney problems, high cholesterol, high blood pressure, blood vessel problems, eye problems, and eating disorders. Some screenings may begin right away and some may happen within the first 5 years of diagnosis. You will need to continue screenings through your lifetime. Keep your follow-up appointments with all providers.  Ask about vaccines. You have a higher risk for serious illness if you get the flu, pneumonia, or hepatitis. Ask your healthcare provider if you should get a flu, pneumonia, or hepatitis B vaccine, and when to get the vaccine.  Follow up with your healthcare provider as directed: You may need to return to have your A1c every 3 months. An A1c test shows the average amount of sugar in your blood over the past 2 to 3 months. Your healthcare provider will tell you what your A1c level should be.    Meal Planning with the Plate Method    WHAT YOU NEED TO KNOW:  Meal planning with the plate method is a simple way for people with diabetes to plan meals. This method can help you to eat the right amount of carbohydrates and keep your blood sugar levels under control. Carbohydrates naturally raise your blood sugar after eating. Your blood sugar can rise to a high level if you eat too much carbohydrate at one time. Carbohydrates are found in starches (bread, cereal, starchy vegetables, and beans), fruit, milk, yogurt, and sweets.    DISCHARGE INSTRUCTIONS:  How to use the plate method to plan meals:    Draw an imaginary line down the middle of a 9-inch dinner plate. On one side, draw another line to divide that section in half. Your plate will have 3 sections.  Fill the largest section of your plate with non-starchy vegetables. These include broccoli, spinach, cucumbers, peppers, cauliflower, and tomatoes.  Add a starch to 1 of the small sections of your plate. Starches include pasta, rice, whole-grain bread, tortillas, corn, potatoes, and beans.  Add meat or another source of protein to the other small section of your plate. Examples include chicken or turkey without skin, fish, lean beef or pork, low-fat cheese, tofu, or eggs.  Add dairy or fruit to the side of your plate if your meal plan allows. Examples of dairy include skim or 1% milk or low-fat yogurt. If you do not drink milk, you may be able to add another serving of starchy food instead.  Add a low-calorie or calorie-free drink such as water or unsweetened tea or coffee.  Plate Method  Serving sizes of foods:  Non-starchy vegetables:  ½ cup of cooked vegetables or 1 cup of raw vegetables  ½ cup of vegetable juice    Starches:  1 ounce of whole-wheat bread or 1 small (6 inch) flour or corn tortilla  1 small (4 inch) pancake (about ¼ inch thick)  ¾ cup of dry, unsweetened, whole-grain ready-to-eat cereal or ¼ cup of low-fat granola  ½ cup of cooked cereal or oatmeal  ? cup of rice or pasta  ½ cup of corn, green peas, sweet potatoes, or mashed potatoes  ½ cup of cooked beans and peas (garbanzo, kulkarni, kidney, white, split, black-eyed)    Meat and other protein sources:  3 to 4 ounces of any lean meat, fish, or poultry  ½ cup of tofu or tempeh  1 large egg  1½ ounces (about 2 tablespoons) of nuts or 2 tablespoons of peanut butter    Fruit:  1 small piece of fresh fruit  ½ cup of canned or fresh fruit or unsweetened fruit juice  ¼ cup of dried fruit  Milk and yogurt:  1 cup (8 ounces) of skim or 1% milk  ¾ cup (6 ounces) of plain, non-fat yogurt  Other guidelines to follow:    Limit salt and sugar. Choose and prepare foods and drinks with less salt and added sugars. Use the nutrition information on food labels to help you make healthy choices. The percent daily value listed on the food label tells you whether a food is low or high in certain nutrients. A percent daily value of 5% or less means that the food is low in a nutrient. A percent daily value of 20% or more means that the food is high in a nutrient.    Choose healthy fats. Choose healthy fats such as polyunsaturated and monounsaturated fats in place of unhealthy fats. Healthy fats are found in vegetable oils such as soybean, corn, canola, olive, and sunflower oil. Unhealthy fats are saturated fats, trans fats, and cholesterol. Unhealthy fats are found in shortening, butter, stick margarine, and animal fat.    Ask your healthcare provider if alcohol is safe for you and how much is safe for you. If you choose to drink alcohol, drink it with meals. When you drink alcohol on an empty stomach, your blood sugar may fall to a low level.    Manejo de la diabetes tipo 2 para adultos    LO QUE NECESITAS SABER:  La diabetes tipo 2 es ancelmo enfermedad que afecta la forma en que [place] cuerpo usa la glucosa (azúcar). Normalmente, cuando aumenta el nivel de azúcar en [place], el páncreas produce más insulina. La insulina ayuda a sacar el azúcar de la [place] para que pueda usarse maria victoria energía. La diabetes tipo 2 se desarrolla porque el cuerpo no puede producir suficiente insulina o no puede usar la insulina correctamente. La diabetes tipo 2 se puede controlar para evitar daños en el corazón, los vasos sanguíneos y otros órganos.  Páncreas    INSTRUCCIONES DE DESCARGA:  Qué puede hacer para controlar jerry niveles de azúcar en [place]:  Elija alimentos saludables. Los alimentos saludables pueden darle energía para aprender y mantenerse activo. Los alimentos saludables también pueden ayudarlo a mantener el nivel de azúcar en la [place] en equilibrio y a controlar o perder peso de manera tracy. Trabaje con un dietista para desarrollar un plan de alimentación que funcione para usted y [place] horario. Un dietista puede ayudarlo a aprender a comer la cantidad correcta de carbohidratos cecy jerry comidas y refrigerios. Los carbohidratos pueden elevar [place] nivel de azúcar en [place] si come demasiados a la vez. Algunos alimentos que contienen carbohidratos incluyen panes, cereales, arroz, pasta y dulces.  Elija bebidas saludables. Beber agua. Disminuya la cantidad de bebidas con sustitutos del azúcar que tenga, maria victoria los refrescos dietéticos. Evite las bebidas endulzadas con azúcar, maria victoria los refrescos y los jugos de frutas.  Realice actividad física con regularidad. La actividad física ayuda a reducir los niveles de azúcar en [place]. También puede ayudarlo a controlar [place] peso. Realice al menos 150 minutos de actividad física aeróbica moderada a vigorosa cada semana. No te pierdas más de 2 días seguidos. No se siente más de 30 minutos seguidos. [place] proveedor de atención médica puede ayudarlo a crear un plan de actividades. El plan puede incluir las mejores actividades para usted y puede ayudarlo a desarrollar [place] fuerza y ??resistencia.  Caminar para hacer ejercicio      Mantener un peso saludable. Pregúntele a [place] proveedor de atención médica cuánto debe pesar. [place] que le ayude a crear un plan de pérdida de peso seguro si tiene sobrepeso. La pérdida de peso puede mejorar jerry niveles de azúcar en [place].  Controle [place] nivel de azúcar en [place] según las indicaciones y según sea necesario. Pregúntele a [place] proveedor de atención médica cuáles deberían ser jerry niveles de azúcar en [place].  Alysia [place] horario y planifique cuándo controlará jerry niveles de azúcar en [place] a lo emeka del día.  Controle con más frecuencia si malcom que [place] nivel de azúcar en [place] es demasiado alto o demasiado bajo. [place] le permitirá controlar los niveles altos o bajos de azúcar en [place] para que no interfieran con jerry actividades.  Gire los sitios donde realiza las punciones digitales. [place] ayudará a que los controles macrina menos dolorosos y a que los sitios de punción digital macrina menos visibles.  Anote jerry niveles de azúcar en [place] para poder mostrárselos a [place] proveedor de atención médica cecy jerry visitas. Hable con [place] proveedor de atención médica si tiene problemas para mantener [place] azúcar en [place] en los niveles recomendados.  [place] medicamento para la diabetes o insulina según las indicaciones. Es posible que necesite medicamentos para la diabetes, insulina o ambos para ayudar a controlar jerry niveles de azúcar en [place]. [place] proveedor de atención médica le enseñará cómo y cuándo emelyn [place] medicamento para la diabetes o insulina.  Lo que necesita saber sobre el nivel alto de azúcar en [place]: Es posible que el nivel alto de azúcar en [place] no cause ningún síntoma. Puede hacer que sienta más sed de lo habitual o que orine con más frecuencia de lo habitual. Con el tiempo, los niveles altos de azúcar en [place] pueden dañar jerry nervios, vasos sanguíneos, tejidos y órganos.  Las comidas abundantes o grandes cantidades de carbohidratos a la vez pueden elevar [place] nivel de azúcar en [place].  La disminución de la actividad física puede elevar el nivel de azúcar en [place]. Por ejemplo, [place] nivel de azúcar en [place] puede aumentar si kishan de practicar un deporte o de realizar actividad física con regularidad. No se siente por más de 90 minutos seguidos.  El estrés puede elevar [place] nivel de azúcar en [place]. [place] ayuda a [place] proveedor de atención médica si tiene problemas para controlar el estrés.  La enfermedad puede elevar [place] nivel de azúcar en [place]. [place] puede suceder incluso si come menos de lo habitual mientras está enfermo. Trabaje con [place] proveedor de atención médica para desarrollar un plan para los días de enfermedad. Nelda es un plan que le ayuda a controlar jerry niveles de azúcar en [place] mientras está enfermo.  Ancelmo dosis más baja de medicamento o insulina, o ancelmo dosis tardía, puede elevar [place] nivel de azúcar en [place]. No hay tiempo suficiente para que [place] medicamento o insulina actúen maria victoria deberían si se raymond tarde. Cuando zana ancelmo dosis más baja, no se necesita suficiente medicamento o insulina para reducir [place] nivel de azúcar en [place].  Lo que necesita saber sobre los niveles bajos de azúcar en [place]: Tenga en cuenta los síntomas de niveles bajos de azúcar en [place] si usa insulina. Puede prevenir síntomas maria victoria temblores, mareos, irritabilidad o confusión al prevenir la hipoglucemia.  Trate los niveles bajos de azúcar en [place] de inmediato. Coma 15 gramos de carbohidratos, maria victoria 4 onzas de jugo o 1 tubo de gel de glucosa. Vuelva a controlar [place] nivel de azúcar en [place] de 10 a 15 minutos después. Cuando [place] nivel de azúcar en la [place] vuelva a la normalidad, coma ancelmo comida o un refrigerio para evitar otra disminución del azúcar en la [place].    Maneras de aumentar el azúcar en [place]    [place] nivel de azúcar en [place] puede bajar demasiado si zana medicamentos para la diabetes o insulina y no come lo suficiente. También puede suceder si se salta ancelmo comida o un refrigerio.  El aumento de la actividad física puede provocar niveles bajos de azúcar en [place]. Si tu  Planificación alimenticia con el método del plato    LO QUE NECESITA SABER:    La planificación alimenticia con el método del plato es ancelmo manera simple para las personas con diabetes de planear los alimentos. Nelda método puede ayudar a comer la cantidad correcta de carbohidratos y mantener los niveles de azúcar en la [place] bajo control. Los carbohidratos elevan naturalmente los niveles de azúcar en la [place]. El azúcar en [place] puede subir demasiado alto si usted come muchos carbohidratos al mismo tiempo. Los carbohidratos se encuentran en los almidones (pan, cereal, verduras con almidón y frijoles), fruta, leche, yogur y dulces.    INSTRUCCIONES SOBRE EL [facility]:    Cómo utilizar el método del plato para planear los alimentos:  Dibuje ancelmo línea imaginaria en medio de un plato de comida de 9 pulgadas. En un lado, dibuje otra línea para dividir [place] sección a la mitad. El plato tendrá 3 secciones.  Llene la sección mas lori del plato con verduras sin almidón. Estos incluyen al brócoli, espinacas, pepino, pimientos, coliflor y tomates.  Agregue un almidón a 1 sección pequeña del plato. Los almidones incluyen pasta, arroz, panes de lynnette entero, tortillas, maíz, gloria y frijoles.  Agregue carne u otra carolina de proteína a la otra sección pequeña de [place] plato. Por ejemplo, manju o pavo sin piel, pescado, carne de res o de puerco, queso bajo en grasa, tofu o huevos.  Agregue productos lácteos o fruta al lado de [place] plato si [place] plan alimenticio lo permite. Ejemplos de productos lácteos incluyen leche descremada o del 1% o yogur bajo en grasa. Si usted no zana leche, es posible que pueda agregar otra porción de almidón en vez de eso.    Agregue ancelmo bebida baja en calorías o sin calorías maria victoria agua o té o café sin azúcar.  Técnica de réplica en placa  Tamaño de las porciones de los alimentos:  Verduras sin almidón:  ½ de taza de verdura cocida o 1 taza de verdura cruda  ½ taza de jugo de verduras    Almidones:  1 onza de pan de lynnette entero o 1 tortilla pequeña (6 pulgadas) de harina o maíz  1 panqué (4 pulgadas) pequeño (de aproximadamente ¼ de pulgada de grosor)  ¾ de taza de cereal seco, sin endulzar, de grano entero y listo para comer, o ¼ de taza de granola baja en grasa  ½ taza de cereal o sukhjinder cocidos  ? de taza de arroz o pasta  ½ de taza de maíz, chícharos verdes, camote o puré de papa  ½ taza de frijoles y legumbres (garbanzo, frijol kulkarni, tipo riñón, carlos, partido, olayinka) cocidos    Carne y otras le de proteínas:  De 3 a 4 onzas de cualquier carne magra, pescado o carne de ave  ½ taza de tofu o tempeh  1 huevo lori  1½ onzas (alrededor de 2 cucharadas) de nueces o 2 cucharadas de crema de cacahuate    Frutas:  1 pedazo pequeño de fruta fresca  ½ taza de fruta enlatada o fresca, o jugo de fruta sin azúcar  ¼ de taza de fruta seca  Leche y yogur:  1 taza (8 onzas) de leche sin grasa o del 1%  ¾ de taza (6 onzas) de yogur natural y sin grasa  Otras pautas que debe seguir:    Limite el consumo de sal y azúcar.Seleccione y prepare alimentos y bebidas con menos sal y azúcares agregadas. Utilice la información nutricional en las etiquetas de los alimentos para ayudarle a hacer ancelmo elección mas saludable. El valor del porcentaje diario escrito en las etiquetas de los alimentos, le informa si un alimento es bajo o alto en ciertos nutrientes. Un valor de 5% o menos en el porcentaje diario, significa que el alimento es bajo en el nutriente. Un valor de 20% o más en el porcentaje diario, significa que el alimento es alto en un nutriente.    Seleccione grasas saludables.Elija grasas saludables maria victoria la poliinsaturada y la monoinsaturada en lugar de las grasas malas para la kate. Las grasas saludables se encuentran en los aceites vegetales tales maria victoria el frijol de soya, maíz, canola, rollins y girasol. Las grasas que no son saludables son las grasas saturadas, las grasas transgénicas y el colesterol. Las grasas no saludables se encuentran en la manteca, la mantequilla, la margarina de patience y la grasa animal.    Consulte con [place] médico si usted puede emelyn alcoholy qué cantidad es tracy para usted. Limite el consumo de alcohol.Si usted decide emelyn alcohol, hágalo con alimentos. Cuando usted consume alcohol con el estómago vacío, el azúcar en la [place] puede caer a un nivel bajo.

## 2025-07-07 NOTE — CONSULT NOTE ADULT - ASSESSMENT
64M uncontrolled DM2 HbA1c 12.0% with HTN, HLD, CAD s/p stents, PVD.    #Poorly controlled T2DM with hyperglycemia  - HbA1c: 12.0%  - Home Regimen: prior Ozempic, metformin, Farxiga. Currently off all meds.  - Endocrinologist: None, sees pcp  Inpatient Plan:  - Lantus 16 units at bedtime. DO NOT HOLD IF NPO.  - Admelog 6 units TID pre-meal. HOLD IF NPO.  - low dose Admelog correction scale pre-meal  - low dose Admelog correction scale at bedtime  - Fingerstick BG before meals and bedtime  - Goal -180  - Carbohydrate consistent diet  - RD consult may be pursued as outpatient given CDU stay    Discharge plan:  - Discharge medications:   Lantus pen or equivalent 16 units qhs  BD conor pen needles  Ozempic 0.25mg SQ once weekly x 4 weeks then increase to 0.5mg SQ once weekly. Reviewed benefit of Ozempic for CAD. Combine with basal insulin to offset too much weight loss seen previously.  metformin 500mg daily  Since patient had diarrhea with metformin and constipation with Ozempic will see tolerance when taken together.  Helped download omar 3 jose  send script for Omar 3 plus sensor change every 15 days  Prescribe new glucometer, test strips, lancets  -[facility] endocrinology [phone] will request office to contact patient for scheduling.    #HTN  - continue Losartan and metoprolol  - Can check urine albumin/creatinine outpatient  - Outpatient goal BP <130/80. Management per primary team.    #HLD  - continue rosuvastatin 40    Discussed with primary CDU team.      Endocrine team consulted for uncontrolled diabetes. Patient is high risk with high level decision making due to uncontrolled diabetes which places patient at high risk for cardiovascular and cerebrovascular events. Patient with lability of glucose requiring close monitoring and insulin adjustments.    Donnell Hayes MD  Division of Endocrinology  Pager: [phone]    If after 6PM or before 9AM, or on weekends/holidays, please call endocrine answering service for assistance ([phone]).  For nonurgent matters email [email or web address] for assistance.

## 2025-07-07 NOTE — PROGRESS NOTE ADULT - NS ATTEND AMEND GEN_ALL_CORE FT
Patient seen and examined. Agree with plan as detailed in PA/NP Note.     CTA noted, outpatient f/u with Dr. Cris Diaz MD  Pager: [phone]  Office: [phone]

## 2025-07-07 NOTE — ED CDU PROVIDER SUBSEQUENT DAY NOTE - CLINICAL SUMMARY MEDICAL DECISION MAKING FREE TEXT BOX
64-year-old male with history of hypertension, diabetes, hyperlipidemia, CAD status post stents, PVD, on Brilinta, s/p angioplasty of the left anterior tibial artery 2 weeks ago presented to ED c/p LLE pain radiating posterior upward from knee to buttocks and groin. In the emergency department patient underwent lab analysis which was unremarkable other than an A1c of 12 with initial fingerstick of 392.  Remainder of labs within normal limits.  B/l venous duplex of LEs are negative for DVT. Patient underwent CT angio abdomen/aorta with runoff with and without contrast which shows "bilateral below the knee three-vessel disease.  To summarize, the posterior tibial artery is the main blood vessel supplying flow to the foot bilaterally below the knee with other below the knee vessel disease as described above.  No hemodynamically significant stenosis in the iliac vessels."  Patient was seen by cardiology, Dr. Jose Manuel Diaz who recommends to continue with aspirin, statin, Brilinta as well as losartan and beta-blocker.  Patient placed in CDU for pain control and to be seen by endocrine given A1c of 12.

## 2025-07-07 NOTE — ED CDU PROVIDER DISPOSITION NOTE - ATTENDING CONTRIBUTION TO CARE
CDU MD KRAMER:  I performed a face to face bedside interview with patient regarding history of present illness, review of symptoms and past medical history. I completed an independent physical exam.  I have discussed patient's plan of care with PA.   I agree with note as stated above, having amended the EMR as needed to reflect my findings. I have discussed the assessment and plan of care.  This includes during the time I functioned as the attending physician for this patient.    63 y/o male h/o dm to cdu for hyperglycemia and elevated a1c.  Seen by toribio, given new med recs, rx sent, dc with toribio f/u as o/p.

## 2025-07-07 NOTE — ED CDU PROVIDER DISPOSITION NOTE - PATIENT PORTAL LINK FT
You can access the FollowMyHealth Patient Portal offered by [facility] by registering at the following website: [email or web address]. By joining [facility]’s FollowMyHealth portal, you will also be able to view your health information using other applications (apps) compatible with our system.

## 2025-07-07 NOTE — CONSULT NOTE ADULT - SUBJECTIVE AND OBJECTIVE BOX
HPI: 64-year-old male with history of hypertension, diabetes, hyperlipidemia, CAD status post stents, PVD, on Brilinta, presents to the ED complaining of left lower extremity pain.  States she has pain that radiates from behind the knee posteriorly up the leg to the buttock and groin.  Describes the pain as a pulling sharp pain . No associated back pain.  States pain worsens when he ambulates and improves with rest.  Denies associated numbness or weakness of the leg.  Patient had a recent angioplasty of the left anterior tibial artery 2 weeks ago.  Patient was instructed to go to the ED by his cardiologist for further evaluation.  On exam pt has palpable pulses, warm feet bilaterally and CTA does not reveal any significant occlusions. Patient denies recent injuries, falls or other trauma, heavy lifting, or new exercises.  Pt has chronic back pain for which he is on oxycodone. Pt denies bowel or bladder changes, denies numbness or weakness, denies inability to ambulate. Pt denies fever, chills, chest pain, shortness of breath, abdominal pain, nausea, vomiting, diarrhea.  No saddle anesthesia or urinary retention/incontinence. In the ED his A1C was found to be 12, he was formerly on Ozempic which he states he ran out of and lost a significant amount of weight while taking. Pt states he was given Jardiance but had UTI's while on the medication, pt states he is currently taking Metformin for DM but states he has not been taking it every day. Pt was sent to CDU for endocrinology consultation, glycemic control, reassessment.  Pt states Pt denies any other sx or acute complaints.      PAST MEDICAL & SURGICAL HISTORY:  Diabetes mellitus  type II, diagnosed in 2018      Hypertension      Snoring  MACEY precautions -- responds affirmatively to STOP BANG questionnaire      Enlarged prostate      H/O phimosis  2019      Arthropathy  right shoulder -- never worked up      H/O cholelithiasis  s/p lap cholecystectomy      Enlarged prostate  negatie biopsy      History of back surgery      S/P left rotator cuff repair      S/P right rotator cuff repair          FAMILY HISTORY:  FH: diabetes mellitus  father and brother        Social History:    aspirin 81 mg oral delayed release tablet: 1 tab(s) orally once a day  BD pen needles: use with basal insulin pen as directed. disp: 1 box  blood glucose test strips: use as directed with glucometer. disp: 1 box  Brilinta (ticagrelor) 90 mg oral tablet: 1 tab(s) orally 2 times a day for a minimum of 1 year  Cardiac rehab: Cardiac rehab  2 to 3 times per week for 12 weeks  s/p coronary stent  Cardiac Rehab Referral: Cardiac Rehab 2-3 times per week for 12 weeks  Status Post PCI to LCx  dapagliflozin 10 mg oral tablet: 1 tab(s) orally once a day  Freestyle omar 3 plus sensor : change every 15 days - disp: 2 sensors (1 month supply)  gabapentin 300 mg oral tablet: 1 tab(s) orally 3 times a day (patient says he normally takes it 3x/week at bedtime)  Glucometer: check blood sugar 3 times daily  Lantus Solostar Pen 100 units/mL subcutaneous solution: 16 unit(s) subcutaneous once a day (at bedtime)  losartan 25 mg oral tablet: 1 tab(s) orally once a day  metFORMIN 500 mg oral tablet: 1 tab(s) orally once a day  metoprolol succinate 50 mg oral tablet, extended release: 1 tab(s) orally once a day  Movantik 25 mg oral tablet: 1 tab(s) orally once a day (patient takes it every other day)  Narcan 4 mg/0.1 mL nasal spray: 1 spray(s) intranasally once as needed for  opioid overdose  rosuvastatin 40 mg oral tablet: 1 tab(s) orally once a day (at bedtime)  RoxyBond 15 mg oral tablet: 1 tab(s) orally 3 times a day  semaglutide 0.25 mg/0.5 mL (0.25 mg dose) subcutaneous solution: 0.25 milligram(s) subcutaneously once a week Disp 4 weeks supply  tamsulosin 0.4 mg oral capsule: 1 cap(s) orally 2 times a day  traMADol 50 mg oral tablet: 1 tab(s) orally 3 times a day as needed for  pain (patient reports he rarely takes tramadol)      MEDICATIONS  (STANDING):  aspirin  chewable 81 milliGRAM(s) Oral daily  dextrose 5%. 1000 milliLiter(s) (50 mL/Hr) IV Continuous <Continuous>  dextrose 5%. 1000 milliLiter(s) (100 mL/Hr) IV Continuous <Continuous>  dextrose 50% Injectable 25 Gram(s) IV Push once  dextrose 50% Injectable 12.5 Gram(s) IV Push once  dextrose 50% Injectable 25 Gram(s) IV Push once  gabapentin 300 milliGRAM(s) Oral at bedtime  glucagon  Injectable 1 milliGRAM(s) IntraMuscular once  insulin glargine Injectable (LANTUS) 16 Unit(s) SubCutaneous at bedtime  insulin lispro (ADMELOG) corrective regimen sliding scale   SubCutaneous Before meals and at bedtime  insulin lispro Injectable (ADMELOG) 6 Unit(s) SubCutaneous before breakfast  insulin lispro Injectable (ADMELOG) 6 Unit(s) SubCutaneous before lunch  insulin lispro Injectable (ADMELOG) 6 Unit(s) SubCutaneous before dinner  losartan 25 milliGRAM(s) Oral daily  metoprolol succinate ER 50 milliGRAM(s) Oral daily  naloxegol 25 milliGRAM(s) Oral daily  rosuvastatin 40 milliGRAM(s) Oral at bedtime  ticagrelor 60 milliGRAM(s) Oral every 12 hours    MEDICATIONS  (PRN):  acetaminophen     Tablet .. 975 milliGRAM(s) Oral every 6 hours PRN Mild Pain (1 - 3)  dextrose Oral Gel 15 Gram(s) Oral once PRN Blood Glucose LESS THAN 70 milliGRAM(s)/deciliter  ketorolac   Injectable 15 milliGRAM(s) IV Push every 6 hours PRN Moderate Pain (4 - 6)  oxyCODONE    IR 5 milliGRAM(s) Oral every 6 hours PRN Severe Pain (7 - 10)      Allergies    No Known Allergies    Intolerances      Review of Systems:  Constitutional: No fever  Eyes: No blurry vision  Neuro: No tremors  HEENT: No pain  Cardiovascular: No chest pain, palpitations  Respiratory: No SOB, no cough  GI: No nausea, vomiting, abdominal pain  : No dysuria  Skin: no rash  Psych: no depression  Endocrine: no polyuria, polydipsia  Hem/lymph: no swelling  Osteoporosis: no fractures    ALL OTHER SYSTEMS REVIEWED AND NEGATIVE    UNABLE TO OBTAIN    PHYSICAL EXAM:  VITALS: T(C): 36.6 (07-07-25 @ 09:47)  T(F): 97.9 (07-07-25 @ 09:47), Max: 98.2 (07-06-25 @ 19:24)  HR: 70 (07-07-25 @ 09:47) (70 - 84)  BP: 108/72 (07-07-25 @ 09:47) (108/72 - 132/82)  RR:  (16 - 18)  SpO2:  (95% - 100%)  Wt(kg): --  GENERAL: NAD, well-groomed, well-developed  EYES: No proptosis, no lid lag, anicteric  HEENT:  Atraumatic, Normocephalic, moist mucous membranes  THYROID: Normal size, no palpable nodules  RESPIRATORY: Clear to auscultation bilaterally; No rales, rhonchi, wheezing  CARDIOVASCULAR: Regular rate and rhythm; No murmurs; no peripheral edema  GI: Soft, nontender, non distended, normal bowel sounds  SKIN: Dry, intact, No rashes or lesions  MUSCULOSKELETAL: Full range of motion, normal strength  NEURO: sensation intact, extraocular movements intact, no tremor  PSYCH: Alert and oriented x 3, normal affect, normal mood  CUSHING'S SIGNS: no striae      CAPILLARY BLOOD GLUCOSE      POCT Blood Glucose.: 118 mg/dL (07 Jul 2025 07:21)  POCT Blood Glucose.: 135 mg/dL (06 Jul 2025 21:24)  POCT Blood Glucose.: 314 mg/dL (06 Jul 2025 16:32)                            12.7   4.15  )-----------( 182      ( 07 Jul 2025 05:30 )             35.7       07-07    137  |  105  |  19  ----------------------------<  125[H]  3.9   |  21[L]  |  1.16    eGFR: 71    Ca    8.9      07-07    TPro  6.8  /  Alb  3.6  /  TBili  0.2  /  DBili  x   /  AST  16  /  ALT  20  /  AlkPhos  117  07-06      Thyroid Function Tests:      A1C with Estimated Average Glucose Result: 12.0 % (07-06-25 @ 10:38)  A1C with Estimated Average Glucose Result: 6.8 % (03-04-25 @ 09:19)          Radiology:                HPI: 64-year-old male with history of hypertension, diabetes, hyperlipidemia, CAD status post stents, PVD, on Brilinta, presents to the ED complaining of left lower extremity pain.  States she has pain that radiates from behind the knee posteriorly up the leg to the buttock and groin.  Describes the pain as a pulling sharp pain . No associated back pain.  States pain worsens when he ambulates and improves with rest.  Denies associated numbness or weakness of the leg.  Patient had a recent angioplasty of the left anterior tibial artery 2 weeks ago.  Patient was instructed to go to the ED by his cardiologist for further evaluation.  On exam pt has palpable pulses, warm feet bilaterally and CTA does not reveal any significant occlusions. Patient denies recent injuries, falls or other trauma, heavy lifting, or new exercises.  Pt has chronic back pain for which he is on oxycodone. Pt denies bowel or bladder changes, denies numbness or weakness, denies inability to ambulate. Pt denies fever, chills, chest pain, shortness of breath, abdominal pain, nausea, vomiting, diarrhea.  No saddle anesthesia or urinary retention/incontinence. In the ED his A1C was found to be 12, he was formerly on Ozempic which he states he ran out of and lost a significant amount of weight while taking. Pt states he was given Farxiga but had UTI's while on the medication.    Endocrine history:  64M DM2 x about 3 years. DM2 is complicated by neuropathy, PVD, CAD s/p stents. Denies retinopathy or blurry vision and denies CKD.  Follows with PCP.  Diet watches carbs, drinks water and diet cranberry juice.  Had been doing well on Ozempic had gotten his A1c down to 7% but was losing too much weight and reports had constipation on Ozempic. Was started on Farxiga but had mouth swelling and UTI and so discontinued Farxiga about one month ago.  Ozempic fell off med list since pcp thought he only needed Farxiga - stopped Ozempic about 3 months ago. Prior use of metformin 500mg which was stopped for diarrhea.  Has not used a CGM, is a requesting a new glucometer and is interested in endocrine follow up moving forward.    PAST MEDICAL & SURGICAL HISTORY:  Diabetes mellitus  type II, diagnosed in 2018      Hypertension      Snoring  MACEY precautions -- responds affirmatively to STOP BANG questionnaire      Enlarged prostate      H/O phimosis  2019      Arthropathy  right shoulder -- never worked up      H/O cholelithiasis  s/p lap cholecystectomy      Enlarged prostate  negatie biopsy      History of back surgery      S/P left rotator cuff repair      S/P right rotator cuff repair          FAMILY HISTORY:  FH: diabetes mellitus  father and brothers x 2        Social History: denies tobacco    aspirin 81 mg oral delayed release tablet: 1 tab(s) orally once a day  BD pen needles: use with basal insulin pen as directed. disp: 1 box  blood glucose test strips: use as directed with glucometer. disp: 1 box  Brilinta (ticagrelor) 90 mg oral tablet: 1 tab(s) orally 2 times a day for a minimum of 1 year  Cardiac rehab: Cardiac rehab  2 to 3 times per week for 12 weeks  s/p coronary stent  Cardiac Rehab Referral: Cardiac Rehab 2-3 times per week for 12 weeks  Status Post PCI to LCx  gabapentin 300 mg oral tablet: 1 tab(s) orally 3 times a day (patient says he normally takes it 3x/week at bedtime)  losartan 25 mg oral tablet: 1 tab(s) orally once a day  metoprolol succinate 50 mg oral tablet, extended release: 1 tab(s) orally once a day  Movantik 25 mg oral tablet: 1 tab(s) orally once a day (patient takes it every other day)  Narcan 4 mg/0.1 mL nasal spray: 1 spray(s) intranasally once as needed for  opioid overdose  rosuvastatin 40 mg oral tablet: 1 tab(s) orally once a day (at bedtime)  RoxyBond 15 mg oral tablet: 1 tab(s) orally 3 times a day  tamsulosin 0.4 mg oral capsule: 1 cap(s) orally 2 times a day  traMADol 50 mg oral tablet: 1 tab(s) orally 3 times a day as needed for  pain (patient reports he rarely takes tramadol)      MEDICATIONS  (STANDING):  aspirin  chewable 81 milliGRAM(s) Oral daily  dextrose 5%. 1000 milliLiter(s) (50 mL/Hr) IV Continuous <Continuous>  dextrose 5%. 1000 milliLiter(s) (100 mL/Hr) IV Continuous <Continuous>  dextrose 50% Injectable 25 Gram(s) IV Push once  dextrose 50% Injectable 12.5 Gram(s) IV Push once  dextrose 50% Injectable 25 Gram(s) IV Push once  gabapentin 300 milliGRAM(s) Oral at bedtime  glucagon  Injectable 1 milliGRAM(s) IntraMuscular once  insulin glargine Injectable (LANTUS) 16 Unit(s) SubCutaneous at bedtime  insulin lispro (ADMELOG) corrective regimen sliding scale   SubCutaneous Before meals and at bedtime  insulin lispro Injectable (ADMELOG) 6 Unit(s) SubCutaneous before breakfast  insulin lispro Injectable (ADMELOG) 6 Unit(s) SubCutaneous before lunch  insulin lispro Injectable (ADMELOG) 6 Unit(s) SubCutaneous before dinner  losartan 25 milliGRAM(s) Oral daily  metoprolol succinate ER 50 milliGRAM(s) Oral daily  naloxegol 25 milliGRAM(s) Oral daily  rosuvastatin 40 milliGRAM(s) Oral at bedtime  ticagrelor 60 milliGRAM(s) Oral every 12 hours    MEDICATIONS  (PRN):  acetaminophen     Tablet .. 975 milliGRAM(s) Oral every 6 hours PRN Mild Pain (1 - 3)  dextrose Oral Gel 15 Gram(s) Oral once PRN Blood Glucose LESS THAN 70 milliGRAM(s)/deciliter  ketorolac   Injectable 15 milliGRAM(s) IV Push every 6 hours PRN Moderate Pain (4 - 6)  oxyCODONE    IR 5 milliGRAM(s) Oral every 6 hours PRN Severe Pain (7 - 10)      Allergies    No Known Allergies    Intolerances      Review of Systems:  Constitutional: No fever  Eyes: No blurry vision  Neuro: No tremors  HEENT: No pain  Cardiovascular: No chest pain, palpitations  Respiratory: No SOB, no cough  GI: No nausea, vomiting, abdominal pain  : No dysuria  Skin: no rash  Psych: no depression  Endocrine: no polyuria, polydipsia  Hem/lymph: no swelling  Osteoporosis: no fractures    ALL OTHER SYSTEMS REVIEWED AND NEGATIVE      PHYSICAL EXAM:  VITALS: T(C): 36.6 (07-07-25 @ 09:47)  T(F): 97.9 (07-07-25 @ 09:47), Max: 98.2 (07-06-25 @ 19:24)  HR: 70 (07-07-25 @ 09:47) (70 - 84)  BP: 108/72 (07-07-25 @ 09:47) (108/72 - 132/82)  RR:  (16 - 18)  SpO2:  (95% - 100%)  Wt(kg): --  GENERAL: NAD, well-groomed, well-developed  EYES: No proptosis, no lid lag, anicteric  HEENT:  Atraumatic, Normocephalic, moist mucous membranes  THYROID: Normal size, no palpable nodules  RESPIRATORY: Clear to auscultation bilaterally; No rales, rhonchi, wheezing  CARDIOVASCULAR: Regular rate and rhythm; No murmurs; no peripheral edema  GI: Soft, nontender, non distended  SKIN: Dry, intact, No rashes or lesions  MUSCULOSKELETAL: Full range of motion, normal strength  NEURO: sensation intact, extraocular movements intact, no tremor  PSYCH: Alert and oriented x 3, normal affect, normal mood  CUSHING'S SIGNS: no striae      CAPILLARY BLOOD GLUCOSE      POCT Blood Glucose.: 118 mg/dL (07 Jul 2025 07:21)  POCT Blood Glucose.: 135 mg/dL (06 Jul 2025 21:24)  POCT Blood Glucose.: 314 mg/dL (06 Jul 2025 16:32)                            12.7   4.15  )-----------( 182      ( 07 Jul 2025 05:30 )             35.7       07-07    137  |  105  |  19  ----------------------------<  125[H]  3.9   |  21[L]  |  1.16    eGFR: 71    Ca    8.9      07-07    TPro  6.8  /  Alb  3.6  /  TBili  0.2  /  DBili  x   /  AST  16  /  ALT  20  /  AlkPhos  117  07-06      Thyroid Function Tests:      A1C with Estimated Average Glucose Result: 12.0 % (07-06-25 @ 10:38)  A1C with Estimated Average Glucose Result: 6.8 % (03-04-25 @ 09:19)          Radiology:                HPI: 64-year-old male with history of hypertension, diabetes, hyperlipidemia, CAD status post stents, PVD, on Brilinta, presents to the ED complaining of left lower extremity pain.  States she has pain that radiates from behind the knee posteriorly up the leg to the buttock and groin.  Describes the pain as a pulling sharp pain . No associated back pain.  States pain worsens when he ambulates and improves with rest.  Denies associated numbness or weakness of the leg.  Patient had a recent angioplasty of the left anterior tibial artery 2 weeks ago.  Patient was instructed to go to the ED by his cardiologist for further evaluation.  On exam pt has palpable pulses, warm feet bilaterally and CTA does not reveal any significant occlusions. Patient denies recent injuries, falls or other trauma, heavy lifting, or new exercises.  Pt has chronic back pain for which he is on oxycodone. Pt denies bowel or bladder changes, denies numbness or weakness, denies inability to ambulate. Pt denies fever, chills, chest pain, shortness of breath, abdominal pain, nausea, vomiting, diarrhea.  No saddle anesthesia or urinary retention/incontinence. In the ED his A1C was found to be 12, he was formerly on Ozempic which he states he ran out of and lost a significant amount of weight while taking. Pt states he was given Farxiga but had UTI's while on the medication.    Endocrine history:  Patient has type 2 diabetes.  64M DM2 x about 3 years. DM2 is complicated by neuropathy, PVD, CAD s/p stents. Denies retinopathy or blurry vision and denies CKD.  Follows with PCP.  Diet watches carbs, drinks water and diet cranberry juice.  Had been doing well on Ozempic had gotten his A1c down to 7% but was losing too much weight and reports had constipation on Ozempic. Was started on Farxiga but had mouth swelling and UTI and so discontinued Farxiga about one month ago.  Ozempic fell off med list since pcp thought he only needed Farxiga - stopped Ozempic about 3 months ago. Prior use of metformin 500mg which was stopped for diarrhea.  Has not used a CGM, is a requesting a new glucometer and is interested in endocrine follow up moving forward.    PAST MEDICAL & SURGICAL HISTORY:  Diabetes mellitus  type II, diagnosed in 2018      Hypertension      Snoring  MACEY precautions -- responds affirmatively to STOP BANG questionnaire      Enlarged prostate      H/O phimosis  2019      Arthropathy  right shoulder -- never worked up      H/O cholelithiasis  s/p lap cholecystectomy      Enlarged prostate  negatie biopsy      History of back surgery      S/P left rotator cuff repair      S/P right rotator cuff repair          FAMILY HISTORY:  FH: diabetes mellitus  father and brothers x 2        Social History: denies tobacco    aspirin 81 mg oral delayed release tablet: 1 tab(s) orally once a day  BD pen needles: use with basal insulin pen as directed. disp: 1 box  blood glucose test strips: use as directed with glucometer. disp: 1 box  Brilinta (ticagrelor) 90 mg oral tablet: 1 tab(s) orally 2 times a day for a minimum of 1 year  Cardiac rehab: Cardiac rehab  2 to 3 times per week for 12 weeks  s/p coronary stent  Cardiac Rehab Referral: Cardiac Rehab 2-3 times per week for 12 weeks  Status Post PCI to LCx  gabapentin 300 mg oral tablet: 1 tab(s) orally 3 times a day (patient says he normally takes it 3x/week at bedtime)  losartan 25 mg oral tablet: 1 tab(s) orally once a day  metoprolol succinate 50 mg oral tablet, extended release: 1 tab(s) orally once a day  Movantik 25 mg oral tablet: 1 tab(s) orally once a day (patient takes it every other day)  Narcan 4 mg/0.1 mL nasal spray: 1 spray(s) intranasally once as needed for  opioid overdose  rosuvastatin 40 mg oral tablet: 1 tab(s) orally once a day (at bedtime)  RoxyBond 15 mg oral tablet: 1 tab(s) orally 3 times a day  tamsulosin 0.4 mg oral capsule: 1 cap(s) orally 2 times a day  traMADol 50 mg oral tablet: 1 tab(s) orally 3 times a day as needed for  pain (patient reports he rarely takes tramadol)      MEDICATIONS  (STANDING):  aspirin  chewable 81 milliGRAM(s) Oral daily  dextrose 5%. 1000 milliLiter(s) (50 mL/Hr) IV Continuous <Continuous>  dextrose 5%. 1000 milliLiter(s) (100 mL/Hr) IV Continuous <Continuous>  dextrose 50% Injectable 25 Gram(s) IV Push once  dextrose 50% Injectable 12.5 Gram(s) IV Push once  dextrose 50% Injectable 25 Gram(s) IV Push once  gabapentin 300 milliGRAM(s) Oral at bedtime  glucagon  Injectable 1 milliGRAM(s) IntraMuscular once  insulin glargine Injectable (LANTUS) 16 Unit(s) SubCutaneous at bedtime  insulin lispro (ADMELOG) corrective regimen sliding scale   SubCutaneous Before meals and at bedtime  insulin lispro Injectable (ADMELOG) 6 Unit(s) SubCutaneous before breakfast  insulin lispro Injectable (ADMELOG) 6 Unit(s) SubCutaneous before lunch  insulin lispro Injectable (ADMELOG) 6 Unit(s) SubCutaneous before dinner  losartan 25 milliGRAM(s) Oral daily  metoprolol succinate ER 50 milliGRAM(s) Oral daily  naloxegol 25 milliGRAM(s) Oral daily  rosuvastatin 40 milliGRAM(s) Oral at bedtime  ticagrelor 60 milliGRAM(s) Oral every 12 hours    MEDICATIONS  (PRN):  acetaminophen     Tablet .. 975 milliGRAM(s) Oral every 6 hours PRN Mild Pain (1 - 3)  dextrose Oral Gel 15 Gram(s) Oral once PRN Blood Glucose LESS THAN 70 milliGRAM(s)/deciliter  ketorolac   Injectable 15 milliGRAM(s) IV Push every 6 hours PRN Moderate Pain (4 - 6)  oxyCODONE    IR 5 milliGRAM(s) Oral every 6 hours PRN Severe Pain (7 - 10)      Allergies    No Known Allergies    Intolerances      Review of Systems:  Constitutional: No fever  Eyes: No blurry vision  Neuro: No tremors  HEENT: No pain  Cardiovascular: No chest pain, palpitations  Respiratory: No SOB, no cough  GI: No nausea, vomiting, abdominal pain  : No dysuria  Skin: no rash  Psych: no depression  Endocrine: no polyuria, polydipsia  Hem/lymph: no swelling  Osteoporosis: no fractures    ALL OTHER SYSTEMS REVIEWED AND NEGATIVE      PHYSICAL EXAM:  VITALS: T(C): 36.6 (07-07-25 @ 09:47)  T(F): 97.9 (07-07-25 @ 09:47), Max: 98.2 (07-06-25 @ 19:24)  HR: 70 (07-07-25 @ 09:47) (70 - 84)  BP: 108/72 (07-07-25 @ 09:47) (108/72 - 132/82)  RR:  (16 - 18)  SpO2:  (95% - 100%)  Wt(kg): --  GENERAL: NAD, well-groomed, well-developed  EYES: No proptosis, no lid lag, anicteric  HEENT:  Atraumatic, Normocephalic, moist mucous membranes  THYROID: Normal size, no palpable nodules  RESPIRATORY: Clear to auscultation bilaterally; No rales, rhonchi, wheezing  CARDIOVASCULAR: Regular rate and rhythm; No murmurs; no peripheral edema  GI: Soft, nontender, non distended  SKIN: Dry, intact, No rashes or lesions  MUSCULOSKELETAL: Full range of motion, normal strength  NEURO: sensation intact, extraocular movements intact, no tremor  PSYCH: Alert and oriented x 3, normal affect, normal mood  CUSHING'S SIGNS: no striae      CAPILLARY BLOOD GLUCOSE      POCT Blood Glucose.: 118 mg/dL (07 Jul 2025 07:21)  POCT Blood Glucose.: 135 mg/dL (06 Jul 2025 21:24)  POCT Blood Glucose.: 314 mg/dL (06 Jul 2025 16:32)                            12.7   4.15  )-----------( 182      ( 07 Jul 2025 05:30 )             35.7       07-07    137  |  105  |  19  ----------------------------<  125[H]  3.9   |  21[L]  |  1.16    eGFR: 71    Ca    8.9      07-07    TPro  6.8  /  Alb  3.6  /  TBili  0.2  /  DBili  x   /  AST  16  /  ALT  20  /  AlkPhos  117  07-06      Thyroid Function Tests:      A1C with Estimated Average Glucose Result: 12.0 % (07-06-25 @ 10:38)  A1C with Estimated Average Glucose Result: 6.8 % (03-04-25 @ 09:19)          Radiology:

## 2025-07-07 NOTE — ED CDU PROVIDER DISPOSITION NOTE - CLINICAL COURSE
64-year-old male with history of hypertension, diabetes, hyperlipidemia, CAD status post stents, PVD, on Brilinta, s/p angioplasty of the left anterior tibial artery 2 weeks ago presented to ED c/p LLE pain radiating posterior upward from knee to buttocks and groin. In the emergency department patient underwent lab analysis which was unremarkable other than an A1c of 12 with initial fingerstick of 392.  Remainder of labs within normal limits.  B/l venous duplex of LEs are negative for DVT. Patient underwent CT angio abdomen/aorta with runoff with and without contrast which shows "bilateral below the knee three-vessel disease.  To summarize, the posterior tibial artery is the main blood vessel supplying flow to the foot bilaterally below the knee with other below the knee vessel disease as described above.  No hemodynamically significant stenosis in the iliac vessels."  Patient was seen by cardiology, Dr. Jose Manuel Diaz who recommends to continue with aspirin, statin, Brilinta as well as losartan and beta-blocker.  Patient placed in CDU for pain control and to be seen by endocrine given A1c of 12. Endocrinology saw patient- recs for outpt. glycemic plan placed - stable for DC.

## 2025-07-07 NOTE — ED CDU PROVIDER SUBSEQUENT DAY NOTE - ATTENDING APP SHARED VISIT CONTRIBUTION OF CARE
CDU MD KRAMER:  I performed a face to face bedside interview with patient regarding history of present illness, review of symptoms and past medical history. I completed an independent physical exam.  I have discussed patient's plan of care with PA.   I agree with note as stated above, having amended the EMR as needed to reflect my findings. I have discussed the assessment and plan of care.  This includes during the time I functioned as the attending physician for this patient.    65 y/o male to cdu for endo eval after found to have hyperglycemia and a1c of 12.

## 2025-07-07 NOTE — PROGRESS NOTE ADULT - SUBJECTIVE AND OBJECTIVE BOX
Date of service 7/7/25    no pain or SOB       acetaminophen     Tablet .. 975 milliGRAM(s) Oral every 6 hours PRN  aspirin  chewable 81 milliGRAM(s) Oral daily  gabapentin 300 milliGRAM(s) Oral at bedtime  glucagon  Injectable 1 milliGRAM(s) IntraMuscular once  insulin glargine Injectable (LANTUS) 16 Unit(s) SubCutaneous at bedtime  insulin lispro (ADMELOG) corrective regimen sliding scale   SubCutaneous Before meals and at bedtime  insulin lispro Injectable (ADMELOG) 6 Unit(s) SubCutaneous before breakfast  insulin lispro Injectable (ADMELOG) 6 Unit(s) SubCutaneous before lunch  insulin lispro Injectable (ADMELOG) 6 Unit(s) SubCutaneous before dinner  ketorolac   Injectable 15 milliGRAM(s) IV Push every 6 hours PRN  losartan 25 milliGRAM(s) Oral daily  metoprolol succinate ER 50 milliGRAM(s) Oral daily  naloxegol 25 milliGRAM(s) Oral daily  oxyCODONE    IR 5 milliGRAM(s) Oral every 6 hours PRN  rosuvastatin 40 milliGRAM(s) Oral at bedtime  ticagrelor 60 milliGRAM(s) Oral every 12 hours                            12.7   4.15  )-----------( 182      ( 07 Jul 2025 05:30 )             35.7     137  |  105  |  19  ----------------------------<  125[H]  3.9   |  21[L]  |  1.16    Ca    8.9      07 Jul 2025 05:30    TPro  6.8  /  Alb  3.6  /  TBili  0.2  /  DBili  x   /  AST  16  /  ALT  20  /  AlkPhos  117  07-06    T(C): 36.6 (07-07-25 @ 09:47), Max: 36.8 (07-06-25 @ 19:24)  HR: 70 (07-07-25 @ 09:47) (70 - 84)  BP: 108/72 (07-07-25 @ 09:47) (108/72 - 132/82)  RR: 18 (07-07-25 @ 09:47) (16 - 18)  SpO2: 98% (07-07-25 @ 09:47) (95% - 100%)  Wt(kg): --    General: Well nourished in no acute distress. Alert and Oriented * 3.   Head: Normocephalic and atraumatic.   Neck: No JVD. No bruits. Supple. Does not appear to be enlarged.   Cardiovascular: + S1,S2 ; RRR Soft systolic murmur at the left lower sternal border. No rubs noted.    Lungs: CTA b/l. No rhonchi, rales or wheezes.   Abdomen: + BS, soft. Non tender. Non distended. No rebound. No guarding.   Extremities: No clubbing/cyanosis/edema.   Neurologic: Moves all four extremities. Full range of motion.   Skin: Warm and moist. The patient's skin has normal elasticity and good skin turgor.   Psychiatric: Appropriate mood and affect.  Musculoskeletal: Normal range of motion, normal strength    DATA    < from: CT Angio Abd Aorta w/run-off w/ IV Cont (07.06.25 @ 12:25) >  IMPRESSION:    1.  Bilateral below the knee three-vessel disease as described above. To   summarize, the posterior tibial artery is the main blood vessel supplying   flow to the foot bilaterally below the knee with the other below the knee   vessel disease as described above  2.  No hemodynamically significant stenosis in the iliac vessels.    < end of copied text >        ASSESSMENT/PLAN: 	64 year old male with PMH HTN, DM, HLD, hx cervical decompression surgery 4/2024, with recurrent syncopal episodes CAD s/p PCI to LAD and LCX March 2025 with known PAD with recent distal left AT intervention with known proximal occluded AT presents with left leg pain and lower back pain. Patients wife called office last night states patient having worsening left leg pain worse with walking. SHE reports leg was cold, patient instructed to come to ER for concern for ischemic leg. IN ED leg warm and patient with dopplarable AT pulse on left and right. Now being worked up for sciatica and elevated glucose    PAD/CAD with recent PCI to LAD and LCX  -  known PAD with recent distal left AT intervention with known proximal occluded AT  -  leg warm and patient with dopplarable AT pulse on left and right. Now being worked up for sciatica and elevated glucose  - CTA b/l lower extremities with runoff noted, will d/w Dr Lynch, plan for close OP F/U.  no inpatient procedures planned  - c/w ASA, Statin, and Brilinta for recent PCI  - c/w losartan and BB    Karolina Perez PA  [phone]

## 2025-07-07 NOTE — ED CDU PROVIDER SUBSEQUENT DAY NOTE - HISTORY
64-year-old male with history of hypertension, diabetes, hyperlipidemia, CAD status post stents, PVD, on Brilinta, s/p angioplasty of the left anterior tibial artery 2 weeks ago presented to ED c/p LLE pain radiating posterior upward from knee to buttocks and groin. In the emergency department patient underwent lab analysis which was unremarkable other than an A1c of 12 with initial fingerstick of 392.  Remainder of labs within normal limits.  B/l venous duplex of LEs are negative for DVT. Patient underwent CT angio abdomen/aorta with runoff with and without contrast which shows "bilateral below the knee three-vessel disease.  To summarize, the posterior tibial artery is the main blood vessel supplying flow to the foot bilaterally below the knee with other below the knee vessel disease as described above.  No hemodynamically significant stenosis in the iliac vessels."  Patient was seen by cardiology, Dr. Jose Manuel Diaz who recommends to continue with aspirin, statin, Brilinta as well as losartan and beta-blocker.  Patient placed in CDU for pain control and to be seen by endocrine given A1c of 12. 64-year-old male with history of hypertension, diabetes, hyperlipidemia, CAD status post stents, PVD, on Brilinta, s/p angioplasty of the left anterior tibial artery 2 weeks ago presented to ED c/p LLE pain radiating posterior upward from knee to buttocks and groin. In the emergency department patient underwent lab analysis which was unremarkable other than an A1c of 12 with initial fingerstick of 392.  Remainder of labs within normal limits.  B/l venous duplex of LEs are negative for DVT. Patient underwent CT angio abdomen/aorta with runoff with and without contrast which shows "bilateral below the knee three-vessel disease.  To summarize, the posterior tibial artery is the main blood vessel supplying flow to the foot bilaterally below the knee with other below the knee vessel disease as described above.  No hemodynamically significant stenosis in the iliac vessels."  Patient was seen by cardiology, Dr. Jose Manuel Diaz who recommends to continue with aspirin, statin, Brilinta as well as losartan and beta-blocker.  Patient placed in CDU for pain control and to be seen by endocrine given A1c of 12.    Interval hx- VSS, pt resting in no distress.

## 2025-07-21 ENCOUNTER — APPOINTMENT (OUTPATIENT)
Dept: UROLOGY | Facility: CLINIC | Age: 65
End: 2025-07-21
Payer: MEDICAID

## 2025-07-21 VITALS
RESPIRATION RATE: 16 BRPM | OXYGEN SATURATION: 97 % | BODY MASS INDEX: 25.9 KG/M2 | DIASTOLIC BLOOD PRESSURE: 101 MMHG | TEMPERATURE: 98 F | SYSTOLIC BLOOD PRESSURE: 163 MMHG | HEIGHT: 71 IN | WEIGHT: 185 LBS | HEART RATE: 85 BPM

## 2025-07-21 DIAGNOSIS — R35.1 BENIGN PROSTATIC HYPERPLASIA WITH LOWER URINARY TRACT SYMPMS: ICD-10-CM

## 2025-07-21 DIAGNOSIS — N40.1 BENIGN PROSTATIC HYPERPLASIA WITH LOWER URINARY TRACT SYMPMS: ICD-10-CM

## 2025-07-21 PROCEDURE — G2211 COMPLEX E/M VISIT ADD ON: CPT | Mod: NC

## 2025-07-21 PROCEDURE — 99214 OFFICE O/P EST MOD 30 MIN: CPT

## 2025-07-21 RX ORDER — TICAGRELOR 90 MG/1
90 TABLET ORAL
Refills: 0 | Status: ACTIVE | COMMUNITY

## 2025-08-21 ENCOUNTER — APPOINTMENT (OUTPATIENT)
Dept: UROLOGY | Facility: CLINIC | Age: 65
End: 2025-08-21
Payer: MEDICAID

## 2025-08-21 VITALS
DIASTOLIC BLOOD PRESSURE: 98 MMHG | HEART RATE: 87 BPM | RESPIRATION RATE: 16 BRPM | SYSTOLIC BLOOD PRESSURE: 165 MMHG | OXYGEN SATURATION: 96 %

## 2025-08-21 DIAGNOSIS — N39.0 URINARY TRACT INFECTION, SITE NOT SPECIFIED: ICD-10-CM

## 2025-08-21 DIAGNOSIS — R97.20 ELEVATED PROSTATE, SPECIFIC ANTIGEN [PSA]: ICD-10-CM

## 2025-08-21 DIAGNOSIS — R35.1 BENIGN PROSTATIC HYPERPLASIA WITH LOWER URINARY TRACT SYMPMS: ICD-10-CM

## 2025-08-21 DIAGNOSIS — N40.1 BENIGN PROSTATIC HYPERPLASIA WITH LOWER URINARY TRACT SYMPMS: ICD-10-CM

## 2025-08-21 PROCEDURE — 99214 OFFICE O/P EST MOD 30 MIN: CPT

## 2025-08-21 PROCEDURE — G2211 COMPLEX E/M VISIT ADD ON: CPT | Mod: NC

## 2025-08-21 RX ORDER — SULFAMETHOXAZOLE AND TRIMETHOPRIM 800; 160 MG/1; MG/1
800-160 TABLET ORAL TWICE DAILY
Qty: 10 | Refills: 0 | Status: ACTIVE | COMMUNITY
Start: 2025-08-21 | End: 1900-01-01

## 2025-08-22 LAB
APPEARANCE: ABNORMAL
BACTERIA: ABNORMAL /HPF
BILIRUBIN URINE: NEGATIVE
BLOOD URINE: ABNORMAL
CAST: 4 /LPF
COLOR: YELLOW
EPITHELIAL CELLS: 1 /HPF
GLUCOSE QUALITATIVE U: NEGATIVE MG/DL
KETONES URINE: NEGATIVE MG/DL
LEUKOCYTE ESTERASE URINE: ABNORMAL
MICROSCOPIC-UA: NORMAL
NITRITE URINE: NEGATIVE
PH URINE: 6
PROTEIN URINE: 100 MG/DL
RED BLOOD CELLS URINE: 0 /HPF
REVIEW: NORMAL
SPECIFIC GRAVITY URINE: 1.02
UROBILINOGEN URINE: 0.2 MG/DL
WBC CLUMPS: PRESENT
WHITE BLOOD CELLS URINE: 564 /HPF

## 2025-08-25 LAB — BACTERIA UR CULT: ABNORMAL

## 2025-08-27 ENCOUNTER — NON-APPOINTMENT (OUTPATIENT)
Age: 65
End: 2025-08-27

## 2025-09-15 ENCOUNTER — TRANSCRIPTION ENCOUNTER (OUTPATIENT)
Age: 65
End: 2025-09-15

## 2025-09-17 ENCOUNTER — APPOINTMENT (OUTPATIENT)
Dept: ENDOCRINOLOGY | Facility: CLINIC | Age: 65
End: 2025-09-17

## 2025-09-17 ENCOUNTER — APPOINTMENT (OUTPATIENT)
Dept: UROLOGY | Facility: CLINIC | Age: 65
End: 2025-09-17

## (undated) DEVICE — DRAPE TOWEL BLUE STICKY

## (undated) DEVICE — NDL SPINAL 18G X 3.5" (PINK)

## (undated) DEVICE — SUT SILK 2-0 18" FS

## (undated) DEVICE — SUT VICRYL 0 18" OS-6 (POP-OFF)

## (undated) DEVICE — SOL IRR BAG NS 0.9% 1000ML

## (undated) DEVICE — SUT VICRYL 2-0 27" SH UNDYED

## (undated) DEVICE — ELCTR BOVIE TIP BLADE INSULATED 2.75" EDGE WITH SAFETY

## (undated) DEVICE — POSITIONER STRAP ARMBOARD VELCRO TS-30

## (undated) DEVICE — MIDAS REX LEGEND BALL FLUTED SM BORE 4.0MM X 10CM

## (undated) DEVICE — Device

## (undated) DEVICE — BIPOLAR FORCEP KIRWAN JEWELERS STR 4" X 0.4MM W 12FT CORD (GREEN)

## (undated) DEVICE — SOL IRR POUR NS 0.9% 1500ML

## (undated) DEVICE — BLADE SURGICAL #10 STAINLESS

## (undated) DEVICE — SYR ASEPTO

## (undated) DEVICE — DRAIN JACKSON PRATT 7MM FLAT FULL NO TROCAR

## (undated) DEVICE — DRAPE BACK TABLE COVER 44X90"

## (undated) DEVICE — SUT PROLENE 3-0 18" PS-2

## (undated) DEVICE — DRSG TELFA 3 X 8

## (undated) DEVICE — DRSG DERMABOND PRINEO 22CM

## (undated) DEVICE — ELCTR GROUNDING PAD ADULT COVIDIEN

## (undated) DEVICE — TAPE SILK 3"

## (undated) DEVICE — FOLEY TRAY 14FR 5CC LF UMETER CLOSED

## (undated) DEVICE — GLV 7.5 PROTEXIS (BLUE)

## (undated) DEVICE — GLV 7.5 PROTEXIS (WHITE)

## (undated) DEVICE — LIJ-KMEDIC KERRISON RONGUER: Type: DURABLE MEDICAL EQUIPMENT

## (undated) DEVICE — STAPLER SKIN VISI-STAT 35 WIDE

## (undated) DEVICE — DRAPE TOWEL BLUE 17" X 24"

## (undated) DEVICE — VENODYNE/SCD SLEEVE CALF MEDIUM

## (undated) DEVICE — DRSG BIOPATCH DISK W CHG 1" W 4.0MM HOLE

## (undated) DEVICE — SUCTION YANKAUER NO CONTROL VENT

## (undated) DEVICE — WARMING BLANKET LOWER ADULT

## (undated) DEVICE — DRAPE SURGICAL #1010

## (undated) DEVICE — DRAPE COVER SNAP 36X30"

## (undated) DEVICE — DRAIN RESERVOIR FOR JACKSON PRATT 100CC CARDINAL

## (undated) DEVICE — NDL HYPO SAFE 21G X 1.5" (GREEN)

## (undated) DEVICE — ELCTR BOVIE TIP BLADE INSULATED 2.75" EDGE

## (undated) DEVICE — POSITIONER FOAM EGG CRATE ULNAR 2PCS (PINK)

## (undated) DEVICE — DRAPE MAYO STAND 30"

## (undated) DEVICE — ELCTR BOVIE PENCIL BLADE 10FT

## (undated) DEVICE — PREP DURAPREP 26CC

## (undated) DEVICE — SUT VICRYL 2-0 27" FS-1 UNDYED

## (undated) DEVICE — PROTECTOR HEEL / ELBOW FLUFFY

## (undated) DEVICE — SUT VICRYL 3-0 18" SH UNDYED (POP-OFF)

## (undated) DEVICE — PACK NEURO MINOR

## (undated) DEVICE — SUT VICRYL 3-0 27" SH UNDYED

## (undated) DEVICE — TUBING FOR SMOKE EVACUATOR (PURPLE END)

## (undated) DEVICE — BIPOLAR FORCEP STRYKER STANDARD 8" X 1MM (YELLOW)

## (undated) DEVICE — BLADE SURGICAL #15 CARBON

## (undated) DEVICE — DRAPE C ARM C-ARMOUR

## (undated) DEVICE — SPONGE PEANUT AUTO COUNT

## (undated) DEVICE — DRSG CURITY GAUZE SPONGE 4 X 4" 12-PLY

## (undated) DEVICE — SOL IRR POUR H2O 500ML

## (undated) DEVICE — DRAPE MINOR PROCEDURE

## (undated) DEVICE — DRAPE 3/4 SHEET 52X76"

## (undated) DEVICE — ELCTR BOVIE PENCIL SMOKE EVACUATION

## (undated) DEVICE — DRSG BENZOIN 0.6CC

## (undated) DEVICE — LABELS BLANK W PEN